# Patient Record
Sex: FEMALE | Race: WHITE | Employment: STUDENT | URBAN - NONMETROPOLITAN AREA
[De-identification: names, ages, dates, MRNs, and addresses within clinical notes are randomized per-mention and may not be internally consistent; named-entity substitution may affect disease eponyms.]

---
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
## 2021-01-04 DIAGNOSIS — J45.31 MILD PERSISTENT ASTHMA WITH ACUTE EXACERBATION: ICD-10-CM

## 2021-01-04 RX ORDER — IPRATROPIUM BROMIDE AND ALBUTEROL SULFATE 2.5; .5 MG/3ML; MG/3ML
1 SOLUTION RESPIRATORY (INHALATION) EVERY 6 HOURS
Qty: 360 ML | Refills: 1 | Status: SHIPPED | OUTPATIENT
Start: 2021-01-04 | End: 2021-06-10

## 2021-01-04 NOTE — TELEPHONE ENCOUNTER
Requested Prescriptions     Pending Prescriptions Disp Refills    ipratropium-albuterol (DUONEB) 0.5-2.5 (3) MG/3ML SOLN nebulizer solution [Pharmacy Med Name: Ipratropium-Albuterol 0.5-2.5 (3) MG/3ML Inhalation Solution (DUO-NEB)] 360 mL 1     Sig: Inhale 3 mLs into the lungs every 6 hours

## 2021-01-15 ENCOUNTER — TELEMEDICINE (OUTPATIENT)
Dept: PRIMARY CARE CLINIC | Age: 21
End: 2021-01-15
Payer: COMMERCIAL

## 2021-01-15 DIAGNOSIS — F41.9 ANXIETY AND DEPRESSION: Primary | ICD-10-CM

## 2021-01-15 DIAGNOSIS — F32.A ANXIETY AND DEPRESSION: Primary | ICD-10-CM

## 2021-01-15 PROCEDURE — 99213 OFFICE O/P EST LOW 20 MIN: CPT | Performed by: PEDIATRICS

## 2021-01-15 RX ORDER — VENLAFAXINE HYDROCHLORIDE 150 MG/1
150 CAPSULE, EXTENDED RELEASE ORAL DAILY
Qty: 30 CAPSULE | Refills: 5 | Status: SHIPPED | OUTPATIENT
Start: 2021-01-15 | End: 2021-05-12

## 2021-01-15 ASSESSMENT — ENCOUNTER SYMPTOMS
SORE THROAT: 0
WHEEZING: 0
SHORTNESS OF BREATH: 0
DIARRHEA: 0
BACK PAIN: 0
ABDOMINAL PAIN: 0
NAUSEA: 0
CONSTIPATION: 0
CHEST TIGHTNESS: 0
COUGH: 1
SINUS PRESSURE: 1
VOMITING: 0
EYE DISCHARGE: 0

## 2021-01-15 NOTE — PROGRESS NOTES
[address] [facility], [address]  Phone [phone]   Fax [phone]      OFFICE VISIT: 1/15/2021    Mayuri Aquinobitt-: 2000      [facility]  Reason For Visit:  Mayuri Suh is a 24 y.o. Depression    She is really battling with depression. She is taking Lexapro does not seem to be working. She is going to start counseling with [facility]. Her mom is not doing well. Her father is in the hospital as well and not doing well. He has been on a ventilator for 3 weeks. He now has a trach. She is just not doing well with all of the stress. She does think that she needs something different than Lexapro to help her through this time. We discussed options and we are going to move to an SNRI as she is not having much trouble with anxiety but it is predominantly depression. We discussed Effexor XR and she is willing to give this a try. Given that she is on 20 mg of Lexapro we are going to use 150 mg of the Effexor XR to start. vitals were not taken for this visit. There is no height or weight on file to calculate BMI. I have reviewed the following with the Ms. Orlando   Lab Review  Office Visit on 10/29/2020   Component Date Value    SARS-CoV-2, GABRIEL 10/29/2020 NOT DETECTED      Copies of these are in the chart.     Current Outpatient Medications   Medication Sig Dispense Refill    venlafaxine (EFFEXOR XR) 150 MG extended release capsule Take 1 capsule by mouth daily 30 capsule 5    ipratropium-albuterol (DUONEB) 0.5-2.5 (3) MG/3ML SOLN nebulizer solution Inhale 3 mLs into the lungs every 6 hours 360 mL 1    atorvastatin (LIPITOR) 20 MG tablet Take 1 tablet by mouth daily 90 tablet 3    ibuprofen (ADVIL;MOTRIN) 800 MG tablet Take 1 tablet by mouth every 8 hours as needed for Pain With food or milk 120 tablet 3    hydrOXYzine (ATARAX) 25 MG tablet Take 1 tablet by mouth every 6 hours as needed for Anxiety 90 tablet 5 HENT: Positive for congestion, postnasal drip ( Thick) and sinus pressure ( Over maxillary sinuses). Negative for ear discharge, ear pain and sore throat. Eyes: Negative for discharge and visual disturbance. Respiratory: Positive for cough. Negative for chest tightness, shortness of breath and wheezing. Asthma is stable. Cardiovascular: Negative for chest pain, palpitations and leg swelling. Gastrointestinal: Negative for abdominal pain, constipation, diarrhea, nausea and vomiting. Genitourinary: Negative for difficulty urinating, dysuria and menstrual problem. Musculoskeletal: Negative for arthralgias, back pain, joint swelling and myalgias. Skin: Negative for rash. Neurological: Negative for dizziness, weakness and headaches. Hematological: Negative for adenopathy. Does not bruise/bleed easily. Psychiatric/Behavioral: Negative for agitation (doing better), sleep disturbance and suicidal ideas. The patient is nervous/anxious (but tolerating). Physical Exam  Physical exam was not performed as this was a video teleconference visit using [address]    1. Anxiety and depression  F41.9 venlafaxine (EFFEXOR XR) 150 MG extended release capsule    F32.9          PLAN    1. Anxiety and depression  We are going to change from Lexapro to Effexor XR  - venlafaxine (EFFEXOR XR) 150 MG extended release capsule; Take 1 capsule by mouth daily  Dispense: 30 capsule; Refill: 5      No orders of the defined types were placed in this encounter. Return in about 1 month (around 2/15/2021) for 15. Ian Urbina is a 24 y.o. female being evaluated by a Virtual Visit (video visit) encounter to address concerns as mentioned above. A caregiver was present when appropriate. Due to this being a TeleHealth encounter (During [facility]), evaluation of the following organ systems was limited: Vitals/Constitutional/EENT/Resp/CV/GI//MS/Neuro/Skin/Heme-Lymph-Imm. Pursuant to the emergency declaration under the [address] [place] [address] and the [employer] Act, this Virtual Visit was conducted with patient's (and/or legal guardian's) consent, to reduce the patient's risk of exposure to COVID-19 and provide necessary medical care. The patient (and/or legal guardian) has also been advised to contact this office for worsening conditions or problems, and seek emergency medical treatment and/or call 911 if deemed necessary. Patient identification was verified at the start of the visit: Yes    Total time spent for this encounter: 20m    Services were provided through a video synchronous discussion virtually to substitute for in-person clinic visit. Patient and provider were located at their individual homes. --DANIELA Naqvi DO on 1/15/2021 at 3:20 PM    An electronic signature was used to authenticate this note.

## 2021-01-15 NOTE — PATIENT INSTRUCTIONS
Patient Education        Recovering From Depression: Care Instructions  Your Care Instructions     Taking good care of yourself is important as you recover from depression. In time, your symptoms will fade as your treatment takes hold. Do not give up. Instead, focus your energy on getting better. Your mood will improve. It just takes some time. Focus on things that can help you feel better, such as being with friends and family, eating well, and getting enough rest. But take things slowly. Do not do too much too soon. You will begin to feel better gradually. Follow-up care is a key part of your treatment and safety. Be sure to make and go to all appointments, and call your doctor if you are having problems. It's also a good idea to know your test results and keep a list of the medicines you take. How can you care for yourself at home? Be realistic  · If you have a large task to do, break it up into smaller steps you can handle, and just do what you can. · You may want to put off important decisions until your depression has lifted. If you have plans that will have a major impact on your life, such as marriage, divorce, or a job change, try to wait a bit. Talk it over with friends and loved ones who can help you look at the overall picture first.  · Reaching out to people for help is important. Do not isolate yourself. Let your family and friends help you. Find someone you can trust and confide in, and talk to that person. · Be patient, and be kind to yourself. Remember that depression is not your fault and is not something you can overcome with willpower alone. Treatment is important for depression, just like for any other illness. Feeling better takes time, and your mood will improve little by little. Stay active  · Stay busy and get outside. Take a walk, or try some other light exercise. · Talk with your doctor about an exercise program. Exercise can help with mild depression. · Avoid sleeping pills unless they are prescribed by the doctor treating your depression. Sleeping pills may make you groggy during the day, and they may interact with other medicine you are taking. · If you have any other illnesses, such as diabetes, heart disease, or high blood pressure, make sure to continue with your treatment. Tell your doctor about all of the medicines you take, including those with or without a prescription. · If you or someone you know talks about suicide, self-harm, or feeling hopeless, get help right away. Call the [address] [place] [address] at 0-534-793-JBTK (5-343.138.9578) or text HOME to 290209 to access the Crisis Text Line. Consider saving these numbers in your phone. When should you call for help? Call 682 anytime you think you may need emergency care. For example, call if:    · You feel like hurting yourself or someone else.     · Someone you know has depression and is about to attempt or is attempting suicide. Call your doctor now or seek immediate medical care if:    · You hear voices.     · Someone you know has depression and:  ? Starts to give away his or her possessions. ? Uses illegal drugs or drinks alcohol heavily. ? Talks or writes about death, including writing suicide notes or talking about guns, knives, or pills. ? Starts to spend a lot of time alone. ? Acts very aggressively or suddenly appears calm. Watch closely for changes in your health, and be sure to contact your doctor if:    · You do not get better as expected. Where can you learn more? Go to https://Casa SystemslakshmiGreen Zebra Grocery.Ascent Corporation. org and sign in to your Triventus account. Enter H479 in the Gauzy box to learn more about \"Recovering From Depression: Care Instructions. \"     If you do not have an account, please click on the \"Sign Up Now\" link. Current as of: January 31, 2020               Content Version: 12.6  © 9853-5671 Once Innovations, Incorporated. Care instructions adapted under license by [facility] ([facility]). If you have questions about a medical condition or this instruction, always ask your healthcare professional. [address] any warranty or liability for your use of this information. Patient Education        Depression Treatment: Care Instructions  Your Care Instructions     Depression is a condition that affects the way you feel, think, and act. It causes symptoms such as low energy, loss of interest in daily activities, and sadness or grouchiness that goes on for a long time. Depression is very common and affects men and women of all ages. Depression is a medical illness caused by changes in the natural chemicals in your brain. It is not a character flaw, and it does not mean that you are a bad or weak person. It does not mean that you are going crazy. It is important to know that depression can be treated. Medicines, counseling, and self-care can all help. Many people do not get help because they are embarrassed or think that they will get over the depression on their own. But some people do not get better without treatment. Follow-up care is a key part of your treatment and safety. Be sure to make and go to all appointments, and call your doctor if you are having problems. It's also a good idea to know your test results and keep a list of the medicines you take. How can you care for yourself at home? Learn about antidepressant medicines  Antidepressant medicines can improve or end the symptoms of depression. You may need to take the medicine for at least 6 months, and often longer. Keep taking your medicine even if you feel better. If you stop taking it too soon, your symptoms may come back or get worse. Go to https://chpepiceweb.AngioChem. org and sign in to your GreenWave Reality account. Enter N707 in the [facility] box to learn more about \"Depression Treatment: Care Instructions. \"     If you do not have an account, please click on the \"Sign Up Now\" link. Current as of: January 31, 2020               Content Version: 12.6  © 2006-2020 LEYIO. Care instructions adapted under license by [facility] ([facility]). If you have questions about a medical condition or this instruction, always ask your healthcare professional. [place] [address] any warranty or liability for your use of this information. Patient Education        Preventing Depression From Coming Back: Care Instructions  Overview     Some people have depression symptoms that come back. Depression often comes and goes during a lifetime. But there are many things you can do to keep it from coming back. Follow-up care is a key part of your treatment and safety. Be sure to make and go to all appointments, and call your doctor if you are having problems. It's also a good idea to know your test results and keep a list of the medicines you take. What do you need to know? Know your risk of depression coming back  Many things can make a person more likely to have depression again. These include having depression symptoms that continue after treatment, a previous episode of depression, and a history of childhood abuse or neglect. It is important to know your risk and to recognize warning signs of depression symptoms returning. Once you know these things, you will be better able to keep it from happening to you. Know the warning signs of depression returning  The two most common signs of depression coming back are:  · Feeling sad or hopeless. · Losing interest in your daily activities. You may have other symptoms, such as:  · You eat more or less than usual.  · You sleep too much or not enough. · You feel restless and unable to sit still. · You feel unable to move. · You feel tired all the time. · You feel unworthy or guilty without an obvious reason. · You have problems concentrating, remembering, or making decisions. · You think often about death or suicide. · You feel angry or have panic attacks. How can you care for yourself at home? · Take your medicine as prescribed. Call your doctor if you have any problems with your medicine. · Continue to take your medicine after your symptoms improve. Taking your medicine for at least 6 months after you feel better can help keep you from getting depressed again. If your depression keeps coming back, your doctor may recommend you take medicine even longer. · Continue counseling. It may help prevent depression from returning, especially if you've had multiple episodes of depression. Talk with your counselor if you are having a hard time attending your sessions or you think the sessions aren't working. Don't just stop going. · Eat healthy foods. Include fruits, vegetables, beans, and whole grains in your diet each day. · Get regular exercise. Go for a walk or jog, ride your bike, or play sports with friends. · See your doctor right away if you have new symptoms or feel that your depression is coming back. · Keep a regular sleep schedule. Try for 8 hours of sleep a night. · Avoid using illegal drugs or marijuana and drinking alcohol. · If you or someone you know talks about suicide, self-harm, or feeling hopeless, get help right away. Call the [facility] at 1-800-273-talk (1-319.590.2020) or text HOME to 418515 to access the Crisis Text Line. Consider saving these numbers in your phone. When should you call for help? Call 911 anytime you think you may need emergency care. For example, call if:    · You are thinking about suicide or are threatening suicide.   · You feel you cannot stop from hurting yourself or someone else.     · You hear or see things that aren't real.     · You think or speak in a bizarre way that is not like your usual behavior. Call your doctor now or seek immediate medical care if:    · You are drinking a lot of alcohol or using illegal drugs.     · You are talking or writing about death. Watch closely for changes in your health, and be sure to contact your doctor if:    · You find it hard or it's getting harder to deal with school, a job, family, or friends.     · You think your treatment is not helping or you are not getting better.     · Your symptoms get worse or you get new symptoms.     · You have any problems with your antidepressant medicines, such as side effects, or you are thinking about stopping your medicine.     · You are having manic behavior, such as having very high energy, needing less sleep than normal, or showing risky behavior such as spending money you don't have or abusing others verbally or physically. Where can you learn more? Go to https://ERN.Bebitos. org and sign in to your SwapBeats account. Enter K524 in the NantWorks box to learn more about \"Preventing Depression From Coming Back: Care Instructions. \"     If you do not have an account, please click on the \"Sign Up Now\" link. Current as of: January 31, 2020               Content Version: 12.6  © 8003-8686 [employer], Incorporated. Care instructions adapted under license by [facility] ([facility]). If you have questions about a medical condition or this instruction, always ask your healthcare professional. [place] [address] any warranty or liability for your use of this information.

## 2021-02-02 RX ORDER — NORGESTIMATE AND ETHINYL ESTRADIOL 0.25-0.035
1 KIT ORAL DAILY
Qty: 84 TABLET | Refills: 3 | Status: SHIPPED | OUTPATIENT
Start: 2021-02-02 | End: 2022-01-26 | Stop reason: SDUPTHER

## 2021-02-02 NOTE — TELEPHONE ENCOUNTER
Received fax from pharmacy requesting refill on pts medication(s). Pt was last seen in office on 1/15/2021  and has a follow up scheduled for 2/22/2021. Will send request to  Dr. Iesha Gonzales  for patient.      Requested Prescriptions     Pending Prescriptions Disp Refills    PREVIFEM 0.25-35 MG-MCG per tablet [Pharmacy Med Name: Norgestimate-Eth Estradiol 0.25-35 MG-MCG Oral Tablet (ORTHO-CYCLEN)] 84 tablet 2     Sig: Take 1 tablet by mouth daily

## 2021-02-22 ENCOUNTER — TELEMEDICINE (OUTPATIENT)
Dept: PRIMARY CARE CLINIC | Age: 21
End: 2021-02-22
Payer: COMMERCIAL

## 2021-02-22 DIAGNOSIS — S86.912A STRAIN OF LEFT KNEE, INITIAL ENCOUNTER: Primary | ICD-10-CM

## 2021-02-22 DIAGNOSIS — J45.20 MILD INTERMITTENT ASTHMA WITHOUT COMPLICATION: ICD-10-CM

## 2021-02-22 DIAGNOSIS — Z98.2 S/P VP SHUNT: ICD-10-CM

## 2021-02-22 DIAGNOSIS — Z86.69 HISTORY OF CHIARI MALFORMATION: ICD-10-CM

## 2021-02-22 DIAGNOSIS — Z87.2 HISTORY OF COLD-INDUCED URTICARIA: ICD-10-CM

## 2021-02-22 PROCEDURE — 99214 OFFICE O/P EST MOD 30 MIN: CPT | Performed by: PEDIATRICS

## 2021-02-22 ASSESSMENT — ENCOUNTER SYMPTOMS
BACK PAIN: 0
WHEEZING: 0
SORE THROAT: 0
COUGH: 0
DIARRHEA: 0
NAUSEA: 0
ABDOMINAL PAIN: 0
SHORTNESS OF BREATH: 0
SINUS PRESSURE: 0
EYE DISCHARGE: 0
CHEST TIGHTNESS: 0
VOMITING: 0
CONSTIPATION: 0

## 2021-02-22 NOTE — PROGRESS NOTES
[facility] [facility], [address]  Phone [phone]   Fax [phone]      OFFICE VISIT: 2021    Tyrese Orlando-: 2000      \[facility]\""  Reason For Visit:  Tyrese Almeida is a 24 y.o. No chief complaint on file. Pt presents via video conferencing on follow up for multiple issues. She twisted her L knee again and this is bothering her . She is taking ibuprofen, but this is not helping as much as it has in the past.    She had an episode of getting really cold and developed whelps and itching. She got in a warm shower and this helped along with a Benadryl. We did discuss using an extended release antihistamine. She is using a ACE bandage. We discussed options including prednisone, but she does not want to do that. Breathing is doing very well. She is still taking Singulair but she is not on an antihistamine  We discussed starting this back up. She is using her inhalers sparingly. She does not need any refills of these today. Headaches: These are also very stable. No problems there       vitals were not taken for this visit. There is no height or weight on file to calculate BMI. I have reviewed the following with the Ms. Orlando   Lab Review  Office Visit on 10/29/2020   Component Date Value    SARS-CoV-2, GABRIEL 10/29/2020 NOT DETECTED      Copies of these are in the chart.     Current Outpatient Medications   Medication Sig Dispense Refill    norgestimate-ethinyl estradiol (PREVIFEM) 0.25-35 MG-MCG per tablet Take 1 tablet by mouth daily 84 tablet 3    venlafaxine (EFFEXOR XR) 150 MG extended release capsule Take 1 capsule by mouth daily 30 capsule 5    ipratropium-albuterol (DUONEB) 0.5-2.5 (3) MG/3ML SOLN nebulizer solution Inhale 3 mLs into the lungs every 6 hours 360 mL 1    atorvastatin (LIPITOR) 20 MG tablet Take 1 tablet by mouth daily 90 tablet 3    ibuprofen (ADVIL;MOTRIN) 800 MG tablet Take 1 tablet by mouth every 8 hours as needed sinus pressure and sore throat. Eyes: Negative for discharge and visual disturbance. Respiratory: Negative for cough, chest tightness, shortness of breath and wheezing. Asthma is stable. Cardiovascular: Negative for chest pain, palpitations and leg swelling. Gastrointestinal: Negative for abdominal pain, constipation, diarrhea, nausea and vomiting. Genitourinary: Negative for difficulty urinating, dysuria and menstrual problem. Musculoskeletal: Negative for arthralgias, back pain, joint swelling and myalgias. Skin: Negative for rash. Neurological: Negative for dizziness, weakness and headaches. Hematological: Negative for adenopathy. Does not bruise/bleed easily. Psychiatric/Behavioral: Negative for agitation (doing better), sleep disturbance and suicidal ideas. The patient is nervous/anxious (but tolerating). Physical Exam  Physical exam was not performed as this was a video teleconference visit using [address]    1. Strain of left knee, initial encounter  S86.912A    2. Mild intermittent asthma without complication  Y11.97    3. History of Chiari malformation  Z86.69    4. S/P  shunt  Z98.2    5. History of cold-induced urticaria  Z87.2          PLAN    1. Strain of left knee, initial encounter  We discussed the options in regards to her knee. She is presently using ibuprofen and compression elevation and rest.  We did discuss the use of caution with ice given her hives recently with cold exposure. We will add back an antihistamine on a daily basis. We will use a long-acting nonsedating antihistamine. 2. Mild intermittent asthma without complication  Stable on present medication regimen    3. History of Chiari malformation  Doing well with present regimen. No significant increase in headaches. 4. S/P  shunt  No issues with shunt.     5.  Cold-induced Urticaria  Recommended that she use a long-acting antihistamine on a daily basis. Cautions recommended with significant cold exposure. No orders of the defined types were placed in this encounter. Return in about 6 months (around 8/22/2021) for 30. Huey Thompson is a 24 y.o. female being evaluated by a Virtual Visit (video visit) encounter to address concerns as mentioned above. A caregiver was present when appropriate. Due to this being a TeleHealth encounter (During DKPSL-86 public health emergency), evaluation of the following organ systems was limited: Vitals/Constitutional/EENT/Resp/CV/GI//MS/Neuro/Skin/Heme-Lymph-Imm. Pursuant to the emergency declaration under the [address] [place] [address] authority and the [employer] Act, this Virtual Visit was conducted with patient's (and/or legal guardian's) consent, to reduce the patient's risk of exposure to COVID-19 and provide necessary medical care. The patient (and/or legal guardian) has also been advised to contact this office for worsening conditions or problems, and seek emergency medical treatment and/or call 911 if deemed necessary. Patient identification was verified at the start of the visit: Yes    Total time spent for this encounter: 30m    Services were provided through a video synchronous discussion virtually to substitute for in-person clinic visit. Patient and provider were located at their individual homes. --DANIELA Wu DO on 2/22/2021 at 8:26 AM    An electronic signature was used to authenticate this note.

## 2021-02-22 NOTE — PATIENT INSTRUCTIONS
Patient Education        Strain or Sprain: Care Instructions  Your Care Instructions     A strain happens when you overstretch, or pull, a muscle. A sprain occurs when you stretch or tear a ligament, the tough tissue that connects one bone to another. These problems can happen when you exercise or lift something or when you are in an accident. Rest and other home care can help strains and sprains heal.  The doctor has checked you carefully, but problems can develop later. If you notice any problems or new symptoms,  get medical treatment right away. Follow-up care is a key part of your treatment and safety. Be sure to make and go to all appointments, and call your doctor if you are having problems. It's also a good idea to know your test results and keep a list of the medicines you take. How can you care for yourself at home? · If your doctor gave you a sling, splint, brace, or immobilizer, use it exactly as directed. · Rest the strained or sprained area, and follow your doctor's advice about when you can be active again. · Put ice or a cold pack on the sore area for 10 to 20 minutes at a time to stop swelling. Try this every 1 to 2 hours for 3 days (when you are awake) or until the swelling goes down. Put a thin cloth between the ice pack and your skin. Keep your splint or brace dry. · Prop up a sore arm or leg on a pillow when you ice it or anytime you sit or lie down. Try to keep it higher than the level of your heart. This will help reduce swelling. · Take pain medicines exactly as directed. ? If the doctor gave you a prescription medicine for pain, take it as prescribed. ? If you are not taking a prescription pain medicine, ask your doctor if you can take an over-the-counter medicine. · Do exercises as directed by your doctor or physical therapist.  · Return to your usual level of activity slowly. · Do not do anything that makes the pain worse. When should you call for help? Call your doctor now or seek immediate medical care if:    · You have severe or increasing pain.     · You have tingling, weakness, or numbness in the area.     · The area turns cold or changes color.     · Your cast or splint feels too tight.     · You have symptoms of a blood clot, such as:  ? Pain in your calf, back of the knee, thigh, or groin. ? Redness and swelling in your leg or groin.     · You cannot move the strained part of your body. Watch closely for changes in your health, and be sure to contact your doctor if:    · You do not get better as expected. Where can you learn more? Go to https://Spoke.docBeat. org and sign in to your Heliatek account. Enter T386 in the 9facts box to learn more about \"Strain or Sprain: Care Instructions. \"     If you do not have an account, please click on the \"Sign Up Now\" link. Current as of: March 2, 2020               Content Version: 12.6  © 2006-2020 [employer] News Enterprises, Incorporated. Care instructions adapted under license by [facility] ([facility]). If you have questions about a medical condition or this instruction, always ask your healthcare professional. [place] [address] any warranty or liability for your use of this information.

## 2021-03-19 ENCOUNTER — IMMUNIZATION (OUTPATIENT)
Dept: VACCINE CLINIC | Facility: HOSPITAL | Age: 21
End: 2021-03-19

## 2021-03-19 PROCEDURE — 91300 HC SARSCOV02 VAC 30MCG/0.3ML IM: CPT | Performed by: THORACIC SURGERY (CARDIOTHORACIC VASCULAR SURGERY)

## 2021-03-19 PROCEDURE — 0001A: CPT | Performed by: THORACIC SURGERY (CARDIOTHORACIC VASCULAR SURGERY)

## 2021-03-23 DIAGNOSIS — J45.20 MILD INTERMITTENT ASTHMA WITHOUT COMPLICATION: ICD-10-CM

## 2021-03-23 RX ORDER — MONTELUKAST SODIUM 10 MG/1
10 TABLET ORAL DAILY
Qty: 90 TABLET | Refills: 3 | Status: SHIPPED | OUTPATIENT
Start: 2021-03-23 | End: 2022-03-28

## 2021-03-23 NOTE — TELEPHONE ENCOUNTER
Received fax from pharmacy requesting refill on pts medication(s). Pt was last seen in office on 2/22/2021  and has a follow up scheduled for 8/23/2021. Will send request to  Dr. Serina Fernández  for patient.      Requested Prescriptions     Pending Prescriptions Disp Refills    montelukast (SINGULAIR) 10 MG tablet [Pharmacy Med Name: Montelukast Sodium 10 MG Oral Tablet (SINGULAIR)] 90 tablet 3     Sig: Take 1 tablet by mouth daily

## 2021-03-26 ENCOUNTER — PATIENT MESSAGE (OUTPATIENT)
Dept: PRIMARY CARE CLINIC | Age: 21
End: 2021-03-26

## 2021-03-26 DIAGNOSIS — M25.562 CHRONIC PAIN OF LEFT KNEE: Primary | ICD-10-CM

## 2021-03-26 DIAGNOSIS — G89.29 CHRONIC PAIN OF LEFT KNEE: Primary | ICD-10-CM

## 2021-04-09 ENCOUNTER — IMMUNIZATION (OUTPATIENT)
Dept: VACCINE CLINIC | Facility: HOSPITAL | Age: 21
End: 2021-04-09

## 2021-04-09 PROCEDURE — 0002A: CPT | Performed by: THORACIC SURGERY (CARDIOTHORACIC VASCULAR SURGERY)

## 2021-04-09 PROCEDURE — 91300 HC SARSCOV02 VAC 30MCG/0.3ML IM: CPT | Performed by: THORACIC SURGERY (CARDIOTHORACIC VASCULAR SURGERY)

## 2021-05-12 DIAGNOSIS — F41.9 ANXIETY AND DEPRESSION: ICD-10-CM

## 2021-05-12 DIAGNOSIS — F32.A ANXIETY AND DEPRESSION: ICD-10-CM

## 2021-05-12 RX ORDER — VENLAFAXINE HYDROCHLORIDE 150 MG/1
150 CAPSULE, EXTENDED RELEASE ORAL DAILY
Qty: 90 CAPSULE | Refills: 2 | Status: SHIPPED | OUTPATIENT
Start: 2021-05-12 | End: 2022-01-25 | Stop reason: SDUPTHER

## 2021-05-12 NOTE — TELEPHONE ENCOUNTER
Received fax from pharmacy requesting refill on pts medication(s). Pt was last seen in office on 2/22/2021  and has a follow up scheduled for 8/23/2021. Will send request to  Dr. Shayy Rivas  for patient. Requested Prescriptions     Pending Prescriptions Disp Refills    venlafaxine (EFFEXOR XR) 150 MG extended release capsule [Pharmacy Med Name: Venlafaxine HCl  MG Oral Capsule Extended Release 24 Hour (EFFEXOR-XR)] 30 capsule 5     Sig: Take 1 capsule by mouth Daily.

## 2021-05-27 ENCOUNTER — PATIENT MESSAGE (OUTPATIENT)
Dept: PRIMARY CARE CLINIC | Age: 21
End: 2021-05-27

## 2021-05-27 NOTE — TELEPHONE ENCOUNTER
Okay to send in [address] 300 mg twice daily x10 days, dispense #20 with no refills    Medrol Dosepak 4 milligrams, 1 pack, no refills

## 2021-05-28 RX ORDER — METHYLPREDNISOLONE 4 MG/1
TABLET ORAL
Qty: 1 KIT | Refills: 0 | Status: SHIPPED | OUTPATIENT
Start: 2021-05-28 | End: 2021-06-03

## 2021-05-28 RX ORDER — CEFDINIR 300 MG/1
300 CAPSULE ORAL 2 TIMES DAILY
Qty: 20 CAPSULE | Refills: 0 | Status: SHIPPED | OUTPATIENT
Start: 2021-05-28 | End: 2021-06-07

## 2021-06-10 DIAGNOSIS — J45.31 MILD PERSISTENT ASTHMA WITH ACUTE EXACERBATION: ICD-10-CM

## 2021-06-10 RX ORDER — IPRATROPIUM BROMIDE AND ALBUTEROL SULFATE 2.5; .5 MG/3ML; MG/3ML
SOLUTION RESPIRATORY (INHALATION)
Qty: 360 ML | Refills: 1 | Status: SHIPPED | OUTPATIENT
Start: 2021-06-10 | End: 2022-09-21 | Stop reason: SDUPTHER

## 2021-08-22 NOTE — PROGRESS NOTES
[address] [facility], [address]  Phone [phone]   Fax [phone]      OFFICE VISIT: 1/10/2018    Hallie Manzanares- : 2000      HPI  Reason For Visit:  Amalia Lund is a 13 y.o. Health Maintenance today  Date of Most Recent Physical:over a year      The patient presents today for one month follow up  See office note from 2017. We treated her with prednisone and DuoNeb's for her asthma. She also had sinusitis at that time and required an antibiotic. Needs immunizations   She needs hepatitis A and meningococcal vaccine. She will also need a 2nd hepatitis A vaccine in 6 months time. height is 5' 8\" (1.727 m) and weight is 309 lb (140.2 kg) (abnormal). Her temporal temperature is 97.1 °F (36.2 °C). Her blood pressure is 134/82 and her pulse is 88. Her oxygen saturation is 98%. Body mass index is 46.98 kg/m². Notes Recorded by EVELYN Maddox on 2017 at 5:20 PM  Please notify:  CRP: WNL  Thyroid: WNL  Cholesterol: Total is elevated at 242. HDL is good at 66. However, the LDL is very elevated at 183. (This was previously 106). We recommend this value be <100. Recommend: Eat a variety of foods every day. Replace red meat with fish, poultry, and soy protein (like tofu). Limit processed and packaged foods like chips, crackers, and cookies. Bake, broil, or steam foods. Don't hsu them. Limit foods high in cholesterol. These include egg yolks. Be physically active. Get at least 30 minutes of exercise on most days of the week. Walking is a good choice. You also may want to do other activities, such as running, swimming, cycling, or playing tennis or team sports. Stay at a healthy weight or lose weight by making the changes in eating and physical activity listed above. Losing just a small amount of weight, even 5 to 10 pounds, can reduce your risk for having a heart attack or stroke. Recommend rechecking lipid profile in 3 months.  If these (3) MG/3ML SOLN nebulizer solution Inhale 3 mLs into the lungs every 4 hours 360 mL 1    ibuprofen (ADVIL;MOTRIN) 800 MG tablet Take 1 tablet by mouth every 8 hours as needed for Pain With food or milk 120 tablet 3    lamoTRIgine (LAMICTAL) 200 MG tablet Take 1 tablet by mouth 2 times daily 60 tablet 11    Cetirizine HCl (ZYRTEC ALLERGY) 10 MG CAPS Take 10 mg by mouth daily      albuterol (PROVENTIL HFA;VENTOLIN HFA) 108 (90 BASE) MCG/ACT inhaler Inhale 2 puffs into the lungs every 6 hours as needed for Wheezing. 1 Inhaler 5    Multiple Vitamin (MULTI-DAY VITAMINS PO) Take 1 tablet by mouth daily. No current facility-administered medications for this visit. Allergies: Bactrim [sulfamethoxazole-trimethoprim]; Other; Pcn [penicillins]; Sulfa antibiotics; and Sulfur    Past Medical History:   Diagnosis Date    Asthma     History of Chiari malformation     Seizures ([facility] [address]     VSD (ventricular septal defect)        Past Surgical History:   Procedure Laterality Date    ADENOIDECTOMY      OTHER SURGICAL HISTORY      chiari decompression    TONSILLECTOMY      TYMPANOSTOMY TUBE PLACEMENT      VENTRICULOPERITONEAL SHUNT         Social History   Substance Use Topics    Smoking status: Never Smoker    Smokeless tobacco: Never Used    Alcohol use No       Review of Systems   Constitutional: Negative for fatigue and unexpected weight change. HENT: Negative for congestion, ear pain, sinus pressure and sore throat. Eyes: Negative for pain and visual disturbance. Respiratory: Negative for cough, shortness of breath and wheezing. Cardiovascular: Negative for chest pain, palpitations and leg swelling. Gastrointestinal: Negative for abdominal pain, diarrhea, nausea and vomiting. Endocrine: Negative for polyuria. Genitourinary: Negative for dysuria, frequency, hematuria and urgency. Musculoskeletal: Negative for back pain and neck pain. Skin: Negative for rash.    Neurological: Negative oral

## 2021-08-30 ENCOUNTER — PATIENT MESSAGE (OUTPATIENT)
Dept: PRIMARY CARE CLINIC | Age: 21
End: 2021-08-30

## 2021-08-30 RX ORDER — METOPROLOL SUCCINATE 25 MG/1
25 TABLET, EXTENDED RELEASE ORAL DAILY
Qty: 30 TABLET | Refills: 5 | Status: SHIPPED | OUTPATIENT
Start: 2021-08-30 | End: 2021-10-01 | Stop reason: SDUPTHER

## 2021-08-30 NOTE — TELEPHONE ENCOUNTER
From: Kraig Mera  To: Javed Sanchez DO  Sent: 8/30/2021 4:18 AM CDT  Subject: Non-Urgent Medical Question    Blood pressure update   Saturday at 10:21 am 157/84 p 93  Saturday at 6:21pm 157/102 p110  Sunday at 11:35am 154/86 p 99  Sunday at 3:23 pm 150/84 p 99  Sunday at 7:34 pm 140/85 p 99  Monday at 4:12am 141/91 p99

## 2021-08-31 ENCOUNTER — TELEMEDICINE (OUTPATIENT)
Dept: PRIMARY CARE CLINIC | Age: 21
End: 2021-08-31

## 2021-08-31 DIAGNOSIS — R00.2 PALPITATIONS: ICD-10-CM

## 2021-08-31 DIAGNOSIS — I10 ESSENTIAL HYPERTENSION: Primary | ICD-10-CM

## 2021-08-31 DIAGNOSIS — F41.9 ANXIETY: ICD-10-CM

## 2021-08-31 DIAGNOSIS — E78.2 MIXED HYPERLIPIDEMIA: ICD-10-CM

## 2021-08-31 DIAGNOSIS — J45.40 MODERATE PERSISTENT ASTHMA WITHOUT COMPLICATION: ICD-10-CM

## 2021-08-31 PROCEDURE — 99214 OFFICE O/P EST MOD 30 MIN: CPT | Performed by: PEDIATRICS

## 2021-08-31 ASSESSMENT — ENCOUNTER SYMPTOMS
VOMITING: 0
EYE DISCHARGE: 0
CHEST TIGHTNESS: 0
COUGH: 0
ABDOMINAL PAIN: 0
SHORTNESS OF BREATH: 0
WHEEZING: 0
SINUS PRESSURE: 0
BACK PAIN: 0
SORE THROAT: 0
DIARRHEA: 0
CONSTIPATION: 0
NAUSEA: 0

## 2021-08-31 NOTE — PROGRESS NOTES
[address] [facility], [address]  Phone [phone]   Fax [phone]      OFFICE VISIT: 2021    Wendie Orlando-: 2000      HPI  Reason For Visit:  Wendie Ventura is a 24 y.o. Anxiety and Depression    Patient presents via [employer] video conferencing on follow-up for anxiety and depression. Present medications:   Effexor  mg daily   Hydroxyzine 25 mg daily   Lamotrigine 200 mg twice daily  Symptoms:she has occasional anxiety attacks, but overall, she is stable. She has rare panic attacks. Hypertension:  She has been having issues with her BP being high. This is improving some. She is going to  the metoprolol today or tomorrow. She is due for some labs. She is working at [employer]. She is vaccinated for Covid 19      Hyperlipidemia  She is not on any medication at present. We do need to recheck labs. Headaches:  She notes that she has headaches when her BP is up  We discussed that this should improve with controlling BP      Palpitations:  She has had episodes of tachycardia. This makes her very nervous and exacerbates the situation. vitals were not taken for this visit. There is no height or weight on file to calculate BMI. I have reviewed the following with the Ms. Orlando   Lab Review  No visits with results within 6 Month(s) from this visit. Latest known visit with results is:   Office Visit on 10/29/2020   Component Date Value    SARS-CoV-2, GABRIEL 10/29/2020 NOT DETECTED      Copies of these are in the chart.     Current Outpatient Medications   Medication Sig Dispense Refill    metoprolol succinate (TOPROL XL) 25 MG extended release tablet Take 1 tablet by mouth daily 30 tablet 5    ipratropium-albuterol (DUONEB) 0.5-2.5 (3) MG/3ML SOLN nebulizer solution Use 1 vial in nebulizer every 6 hours 360 mL 1    venlafaxine (EFFEXOR XR) 150 MG extended release capsule Take 1 capsule by mouth daily 90 capsule 2    Elastic Bandages & Supports (KNEE BRACE) MISC 1 each by Does not apply route daily Wear during activities 1 each 0    montelukast (SINGULAIR) 10 MG tablet Take 1 tablet by mouth daily 90 tablet 3    norgestimate-ethinyl estradiol (PREVIFEM) 0.25-35 MG-MCG per tablet Take 1 tablet by mouth daily 84 tablet 3    atorvastatin (LIPITOR) 20 MG tablet Take 1 tablet by mouth daily 90 tablet 3    ibuprofen (ADVIL;MOTRIN) 800 MG tablet Take 1 tablet by mouth every 8 hours as needed for Pain With food or milk 120 tablet 3    hydrOXYzine (ATARAX) 25 MG tablet Take 1 tablet by mouth every 6 hours as needed for Anxiety 90 tablet 5    lamoTRIgine (LAMICTAL) 200 MG tablet Take 1 tablet by mouth 2 times daily 60 tablet 11    fluticasone (FLONASE) 50 MCG/ACT nasal spray 1 spray by Each Nostril route daily 1 Bottle 1    albuterol sulfate  (90 Base) MCG/ACT inhaler Inhale 2 puffs into the lungs every 6 hours as needed for Wheezing 1 Inhaler 5    Multiple Vitamin (MULTI-DAY VITAMINS PO) Take 1 tablet by mouth daily. No current facility-administered medications for this visit.        Allergies: Eggs or egg-derived products, Bactrim [sulfamethoxazole-trimethoprim], Other, Pcn [penicillins], Sulfa antibiotics, and Sulfur     Past Medical History:   Diagnosis Date    Asthma     History of Chiari malformation     Seizures ([facility] [address]     VSD (ventricular septal defect)        Family History   Problem Relation Age of Onset    Asthma Mother     Lupus Mother     High Blood Pressure Mother     Asthma Maternal Grandmother     Asthma Maternal Grandfather     Cancer Maternal Grandfather     Stroke Maternal Grandfather     Diabetes Maternal Grandfather     Asthma Paternal Grandmother     Stroke Paternal Grandmother     Diabetes Paternal Grandmother     Asthma Paternal Grandfather        Past Surgical History:   Procedure Laterality Date    ADENOIDECTOMY      OTHER SURGICAL HISTORY      chiari decompression    TONSILLECTOMY      TYMPANOSTOMY TUBE PLACEMENT      VENTRICULOPERITONEAL SHUNT         Social History     Tobacco Use    Smoking status: Never Smoker    Smokeless tobacco: Never Used   Substance Use Topics    Alcohol use: No        Review of Systems   Constitutional: Negative for appetite change, chills, fatigue and fever. HENT: Negative for congestion, ear discharge, ear pain, postnasal drip, sinus pressure and sore throat. Eyes: Negative for discharge and visual disturbance. Respiratory: Negative for cough, chest tightness, shortness of breath and wheezing. Asthma is stable. Cardiovascular: Negative for chest pain, palpitations and leg swelling. Blood pressure consistently running high   Gastrointestinal: Negative for abdominal pain, constipation, diarrhea, nausea and vomiting. Genitourinary: Negative for difficulty urinating, dysuria and menstrual problem. Musculoskeletal: Negative for arthralgias, back pain, joint swelling and myalgias. Skin: Negative for rash. Neurological: Positive for headaches ( Mostly when blood pressure is up). Negative for dizziness and weakness. Hematological: Negative for adenopathy. Does not bruise/bleed easily. Psychiatric/Behavioral: Negative for agitation (doing better), sleep disturbance and suicidal ideas. The patient is nervous/anxious (but tolerating). Some panic attacks       Physical Exam  Physical exam was not performed today as this was a video teleconference visit using [address]    1. Essential hypertension  I10 CBC Auto Differential     Comprehensive Metabolic Panel     Microalbumin / Creatinine Urine Ratio   2. Mixed hyperlipidemia  E78.2 Lipid Panel   3. Anxiety  F41.9 T4, Free     TSH without Reflex   4. Palpitations  R00.2 T4, Free     TSH without Reflex   5. Moderate persistent asthma without complication  S83.70 CBC Auto Differential         PLAN    1.  Essential hypertension  We will prescribe Toprol-XL 25 mg daily  She will pick this up either today or tomorrow. Being this is a beta-1 selective beta-blocker, which should result in lowering her blood pressure without any bronchoconstriction at this dose. We could increase this to 50 mg and still remain beta-1 selective. - CBC Auto Differential; Future  - Comprehensive Metabolic Panel; Future  - Microalbumin / Creatinine Urine Ratio; Future    2. Mixed hyperlipidemia  Lipids were elevated last year. She has been watching her diet some. We will see if she was able to get this down on her own  - Lipid Panel; Future    3. Anxiety  Her anxiety is her primary issue right now. The beta-blocker should help with this as well. If this is not sufficient, we will consider additional medications. She is taking Effexor  mg daily. She also has hydroxyzine 25 mg for breakthrough anxiety  - T4, Free; Future  - TSH without Reflex; Future    4. Palpitations  Beta-blockade should be helpful in limiting palpitations. She will initiate this therapy hopefully today  - T4, Free; Future  - TSH without Reflex; Future    5. Moderate persistent asthma without complication  Beta-1 selective beta-blockers should not interfere with her asthma symptoms  - CBC Auto Differential; Future      Orders Placed This Encounter   Procedures    T4, Free    TSH without Reflex    Lipid Panel    CBC Auto Differential    Comprehensive Metabolic Panel    Microalbumin / Creatinine Urine Ratio        Return in about 1 month (around 9/30/2021) for [address] [place] [address], was evaluated through a synchronous (real-time) audio-video encounter. The patient (or guardian if applicable) is aware that this is a billable service. Verbal consent to proceed has been obtained within the past 12 months.  The visit was conducted pursuant to the emergency declaration under the [address] waiver authority and the Coronavirus Preparedness and Response Supplemental Appropriations Act. Patient identification was verified, and a caregiver was present when appropriate. The patient was located in a state where the provider was credentialed to provide care. Total time spent for this encounter: 30m    --DANIELA Del Castillo DO on 8/31/2021 at 8:01 AM    An electronic signature was used to authenticate this note.

## 2021-08-31 NOTE — PATIENT INSTRUCTIONS
Patient Education        High Blood Pressure: Care Instructions  Overview     It's normal for blood pressure to go up and down throughout the day. But if it stays up, you have high blood pressure. Another name for high blood pressure is hypertension. Despite what a lot of people think, high blood pressure usually doesn't cause headaches or make you feel dizzy or lightheaded. It usually has no symptoms. But it does increase your risk of stroke, heart attack, and other problems. You and your doctor will talk about your risks of these problems based on your blood pressure. Your doctor will give you a goal for your blood pressure. Your goal will be based on your health and your age. Lifestyle changes, such as eating healthy and being active, are always important to help lower blood pressure. You might also take medicine to reach your blood pressure goal.  Follow-up care is a key part of your treatment and safety. Be sure to make and go to all appointments, and call your doctor if you are having problems. It's also a good idea to know your test results and keep a list of the medicines you take. How can you care for yourself at home? Medical treatment  · If you stop taking your medicine, your blood pressure will go back up. You may take one or more types of medicine to lower your blood pressure. Be safe with medicines. Take your medicine exactly as prescribed. Call your doctor if you think you are having a problem with your medicine. · Talk to your doctor before you start taking aspirin every day. Aspirin can help certain people lower their risk of a heart attack or stroke. But taking aspirin isn't right for everyone, because it can cause serious bleeding. · See your doctor regularly. You may need to see the doctor more often at first or until your blood pressure comes down.   · If you are taking blood pressure medicine, talk to your doctor before you take decongestants or anti-inflammatory medicine, such as ibuprofen. Some of these medicines can raise blood pressure. · Learn how to check your blood pressure at home. Lifestyle changes  · Stay at a healthy weight. This is especially important if you put on weight around the waist. Losing even 10 pounds can help you lower your blood pressure. · If your doctor recommends it, get more exercise. Walking is a good choice. Bit by bit, increase the amount you walk every day. Try for at least 30 minutes on most days of the week. You also may want to swim, bike, or do other activities. · Avoid or limit alcohol. Talk to your doctor about whether you can drink any alcohol. · Try to limit how much sodium you eat to less than 2,300 milligrams (mg) a day. Your doctor may ask you to try to eat less than 1,500 mg a day. · Eat plenty of fruits (such as bananas and oranges), vegetables, legumes, whole grains, and low-fat dairy products. · Lower the amount of saturated fat in your diet. Saturated fat is found in animal products such as milk, cheese, and meat. Limiting these foods may help you lose weight and also lower your risk for heart disease. · Do not smoke. Smoking increases your risk for heart attack and stroke. If you need help quitting, talk to your doctor about stop-smoking programs and medicines. These can increase your chances of quitting for good. When should you call for help? Call 911  anytime you think you may need emergency care. This may mean having symptoms that suggest that your blood pressure is causing a serious heart or blood vessel problem. Your blood pressure may be over 180/120. For example, call 911 if:    · You have symptoms of a heart attack. These may include:  ? Chest pain or pressure, or a strange feeling in the chest.  ? Sweating. ? Shortness of breath. ? Nausea or vomiting. ? Pain, pressure, or a strange feeling in the back, neck, jaw, or upper belly or in one or both shoulders or arms. ? Lightheadedness or sudden weakness.   ? A fast or irregular heartbeat.     · You have symptoms of a stroke. These may include:  ? Sudden numbness, tingling, weakness, or loss of movement in your face, arm, or leg, especially on only one side of your body. ? Sudden vision changes. ? Sudden trouble speaking. ? Sudden confusion or trouble understanding simple statements. ? Sudden problems with walking or balance. ? A sudden, severe headache that is different from past headaches.     · You have severe back or belly pain. Do not wait until your blood pressure comes down on its own. Get help right away. Call your doctor now or seek immediate care if:    · Your blood pressure is much higher than normal (such as 180/120 or higher), but you don't have symptoms.     · You think high blood pressure is causing symptoms, such as:  ? Severe headache.  ? Blurry vision. Watch closely for changes in your health, and be sure to contact your doctor if:    · Your blood pressure measures higher than your doctor recommends at least 2 times. That means the top number is higher or the bottom number is higher, or both.     · You think you may be having side effects from your blood pressure medicine. Where can you learn more? Go to https://Nimble Apps Limitedpepiceweb.CrimeWatch [place]. org and sign in to your [employer] account. Enter T303 in the [employer] box to learn more about \"High Blood Pressure: Care Instructions. \"     If you do not have an account, please click on the \"Sign Up Now\" link. Current as of: August 31, 2020               Content Version: 12.9  © 2006-2021 [employer]. Care instructions adapted under license by [facility] ([facility]). If you have questions about a medical condition or this instruction, always ask your healthcare professional. [place] [address] any warranty or liability for your use of this information. Patient Education        Learning About High Blood Pressure  What is high blood pressure?      Blood pressure is a measure of how hard the blood pushes against the walls of your arteries. It's normal for blood pressure to go up and down throughout the day. But if it stays up, you have high blood pressure. Another name for high blood pressure is hypertension. Two numbers tell you your blood pressure. The first number is the systolic pressure (top number). It shows how hard the blood pushes when your heart is pumping. The second number is the diastolic pressure (bottom number). It shows how hard the blood pushes between heartbeats, when your heart is relaxed and filling with blood. Your doctor will give you a goal for your blood pressure based on your health and your age. High blood pressure (hypertension) means that the top number stays high, or the bottom number stays high, or both. High blood pressure increases the risk of stroke, heart attack, and other problems. What happens when you have high blood pressure? · Blood flows through your arteries with too much force. Over time, this can damage the heart and the walls of your arteries. But you can't feel it. High blood pressure usually doesn't cause symptoms. · High blood pressure makes your heart work harder. And that can lead to heart failure, which means your heart doesn't pump as much blood as your body needs. · Fat and calcium start to build up in your arteries. This buildup is called hardening of the arteries. It can cause many problems including a heart attack and stroke. · Arteries also carry blood and oxygen to organs like your eyes, kidneys, and brain. If high blood pressure damages those arteries, it can lead to vision loss, kidney disease, stroke, and a higher risk of dementia. How can you prevent high blood pressure? · Stay at a healthy weight. · Try to limit how much sodium you eat to less than 2,300 milligrams (mg) a day. If you limit your sodium to 1,500 mg a day, you can lower your blood pressure even more.   ? Buy foods that are labeled \"unsalted,\" \"sodium-free,\" or \"low-sodium. \" Foods labeled \"reduced-sodium\" and \"light sodium\" may still have too much sodium. ? Flavor your food with garlic, lemon juice, onion, vinegar, herbs, and spices instead of salt. Do not use soy sauce, steak sauce, onion salt, garlic salt, mustard, or ketchup on your food. ? Use less salt (or none) when recipes call for it. You can often use half the salt a recipe calls for without losing flavor. · Be physically active. Get at least 30 minutes of exercise on most days of the week. Walking is a good choice. You also may want to do other activities, such as running, swimming, cycling, or playing tennis or team sports. · Limit alcohol to 2 drinks a day for men and 1 drink a day for women. · Eat plenty of fruits, vegetables, and low-fat dairy products. Eat less saturated and total fats. How is high blood pressure treated? · Your doctor will suggest making lifestyle changes to help your heart. For example, your doctor may ask you to eat healthy foods, quit smoking, lose extra weight, and be more active. · If lifestyle changes don't help enough, your doctor may recommend that you take medicine. · When blood pressure is very high, medicines are needed to lower it. Follow-up care is a key part of your treatment and safety. Be sure to make and go to all appointments, and call your doctor if you are having problems. It's also a good idea to know your test results and keep a list of the medicines you take. Where can you learn more? Go to https://Core Mobile NetworkslakshmiewDimple Dough.Zilta. org and sign in to your [employer] account. Enter P501 in the Sprout Route box to learn more about \"Learning About High Blood Pressure. \"     If you do not have an account, please click on the \"Sign Up Now\" link. Current as of: August 31, 2020               Content Version: 12.9  © 0500-3889 Healthwise, Incorporated. Care instructions adapted under license by [facility] ([facility]).  If you have questions about a medical condition or this instruction, always ask your healthcare professional. [address] any warranty or liability for your use of this information. Patient Education        Learning About Anxiety Disorders  What are anxiety disorders? Anxiety disorders are a type of medical problem. They cause severe anxiety. When you feel anxious, you feel that something bad is about to happen. This feeling interferes with your life. These disorders include:  · Generalized anxiety disorder. You feel worried and stressed about many everyday events and activities. This goes on for several months and disrupts your life on most days. · Panic disorder. You have repeated panic attacks. A panic attack is a sudden, intense fear or anxiety. It may make you feel short of breath. Your heart may pound. · Social anxiety disorder. You feel very anxious about what you will say or do in front of people. For example, you may be scared to talk or eat in public. This problem affects your daily life. · Phobias. You are very scared of a specific object, situation, or activity. For example, you may fear spiders, high places, or small spaces. What are the symptoms? Generalized anxiety disorder  Symptoms may include:  · Feeling worried and stressed about many things almost every day. · Feeling tired or irritable. You may have a hard time concentrating. · Having headaches or muscle aches. · Having a hard time getting to sleep or staying asleep. Panic disorder  You may have repeated panic attacks when there is no reason for feeling afraid. You may change your daily activities because you worry that you will have another attack. Symptoms may include:  · Intense fear, terror, or anxiety. · Trouble breathing or very fast breathing. · Chest pain or tightness. · A heartbeat that races or is not regular.   Social anxiety disorder  Symptoms may include:  · Fear about a social situation, such as eating in front of others or speaking in public. You may worry a lot. Or you may be afraid that something bad will happen. · Anxiety that can cause you to blush, sweat, and feel shaky. · A heartbeat that is faster than normal.  · A hard time focusing. Phobias  Symptoms may include:  · More fear than most people of being around an object, being in a situation, or doing an activity. You might also be stressed about the chance of being around the thing you fear. · Worry about losing control, panicking, fainting, or having physical symptoms like a faster heartbeat when you are around the situation or object. How are these disorders treated? Anxiety disorders can be treated with medicines or counseling. A combination of both may be used. Medicines may include:  · Antidepressants. These may help your symptoms by keeping chemicals in your brain in balance. · Benzodiazepines. These may give you short-term relief of your symptoms. Some people use cognitive-behavioral therapy. A therapist helps you learn to change stressful or bad thoughts into helpful thoughts. Lead a healthy lifestyle  A healthy lifestyle may help you feel better. · Get at least 30 minutes of exercise on most days of the week. Walking is a good choice. · Eat a healthy diet. Include fruits, vegetables, lean proteins, and whole grains in your diet each day. · Try to go to bed at the same time every night. Try for 8 hours of sleep a night. · Find ways to manage stress. Try relaxation exercises. · Avoid alcohol and illegal drugs. Follow-up care is a key part of your treatment and safety. Be sure to make and go to all appointments, and call your doctor if you are having problems. It's also a good idea to know your test results and keep a list of the medicines you take. Where can you learn more? Go to https://trevor.Cricket Media. org and sign in to your BeachMint account.  Enter L883 in the Nipendo box to learn more about \"Learning About Anxiety Disorders. \"     If you do not have an account, please click on the \"Sign Up Now\" link. Current as of: September 23, 2020               Content Version: 12.9  © 2006-2021 Healthwise, [employer]. Care instructions adapted under license by [facility] ([facility]). If you have questions about a medical condition or this instruction, always ask your healthcare professional. [address] any warranty or liability for your use of this information. Patient Education        Palpitations: Care Instructions  Your Care Instructions     Heart palpitations are the uncomfortable sensation that your heart is beating fast or irregularly. You might feel pounding or fluttering in your chest. It might feel like your heart is skipping a beat. Although palpitations may be caused by a heart problem, they also occur because of stress, fatigue, or use of alcohol, caffeine, or nicotine. Many medicines, including diet pills, antihistamines, decongestants, and some herbal products, can cause heart palpitations. Nearly everyone has palpitations from time to time. Depending on your symptoms, your doctor may need to do more tests to try to find the cause of your palpitations. Follow-up care is a key part of your treatment and safety. Be sure to make and go to all appointments, and call your doctor if you are having problems. It's also a good idea to know your test results and keep a list of the medicines you take. How can you care for yourself at home? · Avoid caffeine, nicotine, and excess alcohol. · Do not take illegal drugs, such as methamphetamines and cocaine. · Do not take weight loss or diet medicines unless you talk with your doctor first.  · Get plenty of sleep. · Do not overeat. · If you have palpitations again, take deep breaths and try to relax. This may slow a racing heart. · If you start to feel lightheaded, lie down to avoid injuries that might result if you pass out and fall down.   · Keep a record of your palpitations and bring it to your next doctor's appointment. Write down:  ? The date and time. ? Your pulse. (If your heart is beating fast, it may be hard to count your pulse.)  ? What you were doing when the palpitations started. ? How long the palpitations lasted. ? Any other symptoms. · If an activity causes palpitations, slow down or stop. Talk to your doctor before you do that activity again. · Take your medicines exactly as prescribed. Call your doctor if you think you are having a problem with your medicine. When should you call for help? Call 911 anytime you think you may need emergency care. For example, call if:    · You passed out (lost consciousness).     · You have symptoms of a heart attack. These may include:  ? Chest pain or pressure, or a strange feeling in the chest.  ? Sweating. ? Shortness of breath. ? Pain, pressure, or a strange feeling in the back, neck, jaw, or upper belly or in one or both shoulders or arms. ? Lightheadedness or sudden weakness. ? A fast or irregular heartbeat. After you call 911, the  may tell you to chew 1 adult-strength or 2 to 4 low-dose aspirin. Wait for an ambulance. Do not try to drive yourself.     · You have symptoms of a stroke. These may include:  ? Sudden numbness, tingling, weakness, or loss of movement in your face, arm, or leg, especially on only one side of your body. ? Sudden vision changes. ? Sudden trouble speaking. ? Sudden confusion or trouble understanding simple statements. ? Sudden problems with walking or balance. ? A sudden, severe headache that is different from past headaches. Call your doctor now or seek immediate medical care if:    · You have heart palpitations and:  ? Are dizzy or lightheaded, or you feel like you may faint. ? Have new or increased shortness of breath. Watch closely for changes in your health, and be sure to contact your doctor if:    · You continue to have heart palpitations. Where can you learn more? Go to https://chpepiceweb.Operax. org and sign in to your Astro account. Enter R508 in the Affresolhire box to learn more about \"Palpitations: Care Instructions. \"     If you do not have an account, please click on the \"Sign Up Now\" link. Current as of: August 31, 2020               Content Version: 12.9  © 2006-2021 [employer]. Care instructions adapted under license by [facility] ([facility]). If you have questions about a medical condition or this instruction, always ask your healthcare professional. [address] any warranty or liability for your use of this information. Patient Education        Asthma in Adults: Care Instructions  Overview     Asthma makes it hard for you to breathe. During an asthma attack, the airways swell and narrow. Severe asthma attacks can be dangerous, but you can usually prevent them. Controlling asthma and treating symptoms before they get bad can help you avoid bad attacks. You may also avoid future trips to the doctor. Follow-up care is a key part of your treatment and safety. Be sure to make and go to all appointments, and call your doctor if you are having problems. It's also a good idea to know your test results and keep a list of the medicines you take. How can you care for yourself at home? · Follow your asthma action plan so you can manage your symptoms at home. An asthma action plan will help you prevent and control airway reactions and will tell you what to do during an asthma attack. If you do not have an asthma action plan, work with your doctor to build one. · Take your asthma medicine exactly as prescribed. Medicine plays an important role in controlling asthma. Talk to your doctor right away if you have any questions about what to take and how to take it. ? Use your quick-relief medicine when you have symptoms of an attack. Quick-relief medicine often is an albuterol inhaler. Some people need to use quick-relief medicine before they exercise. ? Take your controller medicine every day, not just when you have symptoms. Controller medicine is usually an inhaled corticosteroid. The goal is to prevent problems before they occur. ? If your doctor prescribed corticosteroid pills to use during an attack, take them as directed. They may take hours to work, but they may shorten the attack and help you breathe better. ? Keep your quick-relief medicine with you at all times. · Talk to your doctor before using other medicines. Some medicines, such as aspirin, can cause asthma attacks in some people. · Check yourself for asthma symptoms to know which step to follow in your action plan. Watch for things like being short of breath, having chest tightness, coughing, and wheezing. Also notice if symptoms wake you up at night or if you get tired quickly when you exercise. · If you have a peak flow meter, use it to check how well you are breathing. This can help you predict when an asthma attack is going to occur. Then you can take medicine to prevent the asthma attack or make it less severe. · See your doctor regularly. These visits will help you learn more about asthma and what you can do to control it. Your doctor will monitor your treatment to make sure the medicine is helping you. · Keep track of your asthma attacks and your treatment. After you have had an attack, write down what triggered it, what helped end it, and any concerns you have about your asthma action plan. Take your diary when you see your doctor. You can then review your asthma action plan and decide if it is working. · Do not smoke or allow others to smoke around you. Avoid smoky places. Smoking makes asthma worse. If you need help quitting, talk to your doctor about stop-smoking programs and medicines. These can increase your chances of quitting for good.   · Learn what triggers an asthma attack for you, and avoid the triggers when you can. Common triggers include colds, smoke, air pollution, dust, pollen, mold, pets, cockroaches, stress, and cold air. · Avoid colds and the flu. Talk to your doctor about getting a pneumococcal vaccine shot. If you have had one before, ask your doctor whether you need a second dose. Get a flu vaccine every fall. If you must be around people with colds or the flu, wash your hands often. When should you call for help? Call 911 anytime you think you may need emergency care. For example, call if:    · You have severe trouble breathing. Call your doctor now or seek immediate medical care if:    · Your symptoms do not get better after you have followed your asthma action plan.     · You cough up yellow, dark brown, or bloody mucus (sputum). Watch closely for changes in your health, and be sure to contact your doctor if:    · Your coughing and wheezing get worse.     · You need to use quick-relief medicine on more than 2 days a week within a month (unless it is just for exercise).     · You need help figuring out what is triggering your asthma attacks. Where can you learn more? Go to https://Cardiostrong.Immunet Corporation. org and sign in to your Natural [employer]vergence account. Enter P597 in the [facility] box to learn more about \"Asthma in Adults: Care Instructions. \"     If you do not have an account, please click on the \"Sign Up Now\" link. Current as of: October 26, 2020               Content Version: 12.9  © 2006-2021 [employer]. Care instructions adapted under license by [facility] ([facility]). If you have questions about a medical condition or this instruction, always ask your healthcare professional. [place] [address] any warranty or liability for your use of this information. Patient Education        Controlling Your Asthma: Care Instructions  Your Care Instructions     Asthma is a long-term condition that affects your breathing.  It causes the airways that lead to the lungs to swell. During an asthma attack, the airways swell and narrow. This makes it hard to breathe. You may wheeze or cough. If you have a bad attack, you may need emergency care. There are two things to do to treat asthma. · Control asthma over the long term. · Treat attacks when they occur. You and your doctor can make an asthma action plan. It tells you what medicines you need to take every day to control asthma symptoms and what to do if you have an asthma attack. Your asthma action plan can help prevent and treat attacks. When you keep your asthma under control, you can prevent severe attacks and lasting damage to your airways. You need to treat your asthma even when you are not having symptoms. Although asthma is a lifelong disease, treatment can help control it and help you stay healthy. Follow-up care is a key part of your treatment and safety. Be sure to make and go to all appointments, and call your doctor if you are having problems. It's also a good idea to know your test results and keep a list of the medicines you take. How can you care for yourself at home? To control asthma over the long term  Medicines   Controller medicines reduce swelling in your lungs. They also prevent asthma attacks. Take your controller medicine exactly as prescribed. Talk to your doctor if you have any problems with your medicine. · Inhaled corticosteroid is a common and effective controller medicine. Using it the right way can prevent or reduce most side effects. · Take your controller medicine every day, not just when you have symptoms. It helps prevent problems before they occur. · Your doctor may prescribe another medicine that you use along with the corticosteroid. This is often a long-acting bronchodilator. Do not take this medicine by itself. Using a long-acting bronchodilator by itself can increase your risk of a severe or fatal asthma attack.   · Do not take inhaled corticosteroids or long-acting bronchodilators to stop an asthma attack that has already started. They don't work fast enough to help. · Talk to your doctor before you use other medicines. Some medicines, such as aspirin, can cause asthma attacks in some people. Education   · Learn what triggers an asthma attack. Avoid these triggers when you can. Common triggers include colds, smoke, air pollution, dust, pollen, mold, pets, cockroaches, stress, and cold air. · Check yourself for asthma symptoms to know which step to follow in your action plan. Watch for things like being short of breath, having chest tightness, coughing, and wheezing. Also notice if symptoms wake you up at night or if you get tired quickly when you exercise. · If you have a peak flow meter, use it to check how well you are breathing. It can help you know when an asthma attack is going to occur. Then you can take medicine to prevent the asthma attack or make it less severe. · Do not smoke or allow others to smoke around you. Avoid smoky places. Smoking makes asthma worse. If you need help quitting, talk to your doctor about stop-smoking programs and medicines. These can increase your chances of quitting for good. · Avoid colds and the flu. Get a pneumococcal vaccine shot. If you have had one before, ask your doctor whether you need a second dose. Get a flu vaccine every year, as soon as it's available. If you must be around people with colds or the flu, wash your hands often. To treat attacks when they occur  Use your asthma action plan when you have an attack. Your quick-relief medicine will stop an asthma attack. It relaxes the muscles that get tight around the airways. If your doctor prescribed corticosteroid pills to use during an attack, take them as directed. They may take hours to work, but they may shorten the attack and help you breathe better. · Albuterol is an effective quick-relief inhaler. · Take your quick-relief medicine exactly as prescribed.   · Always bring your asthma medicine with you when you travel. · You may need to use quick-relief medicine before you exercise. · Call your doctor if you think you are having a problem with your medicine. When should you call for help? Call 911 anytime you think you may need emergency care. For example, call if:    · You are having severe trouble breathing. Call your doctor now or seek immediate medical care if:    · Your symptoms do not get better after you have followed your asthma action plan.     · You cough up yellow, dark brown, or bloody mucus (sputum). Watch closely for changes in your health, and be sure to contact your doctor if:    · Your coughing and wheezing get worse.     · You need to use your quick-relief medicine on more than 2 days a week within a month (unless it is just for exercise).     · You need help figuring out what is triggering your asthma attacks. Where can you learn more? Go to https://Writer's Bloq.[employer]. org and sign in to your [employer] account. Enter P265 in the [employer] box to learn more about \"Controlling Your Asthma: Care Instructions. \"     If you do not have an account, please click on the \"Sign Up Now\" link. Current as of: October 26, 2020               Content Version: 12.9  © 2006-2021 Healthwise, Incorporated. Care instructions adapted under license by [facility] ([facility]). If you have questions about a medical condition or this instruction, always ask your healthcare professional. [place] [address] any warranty or liability for your use of this information.

## 2021-09-01 ENCOUNTER — PATIENT MESSAGE (OUTPATIENT)
Dept: PRIMARY CARE CLINIC | Age: 21
End: 2021-09-01

## 2021-09-01 RX ORDER — ATORVASTATIN CALCIUM 40 MG/1
40 TABLET, FILM COATED ORAL DAILY
Qty: 30 TABLET | Refills: 3 | Status: SHIPPED | OUTPATIENT
Start: 2021-09-01 | End: 2021-11-29

## 2021-09-01 NOTE — TELEPHONE ENCOUNTER
From: Lucila Gómez  To: Harish Roca DO  Sent: 9/1/2021 9:36 AM CDT  Subject: Test Results Question    Wondering if doc seen my lab work

## 2021-09-02 ENCOUNTER — TELEPHONE (OUTPATIENT)
Dept: PRIMARY CARE CLINIC | Age: 21
End: 2021-09-02

## 2021-09-02 NOTE — TELEPHONE ENCOUNTER
----- Message from EVELYN Connell sent at 9/2/2021  1:02 PM CDT -----  Urine good no abnormal protein noted

## 2021-09-07 ENCOUNTER — TELEMEDICINE (OUTPATIENT)
Dept: PRIMARY CARE CLINIC | Age: 21
End: 2021-09-07

## 2021-09-07 DIAGNOSIS — M25.572 ACUTE LEFT ANKLE PAIN: ICD-10-CM

## 2021-09-07 DIAGNOSIS — M79.672 LEFT FOOT PAIN: ICD-10-CM
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
## 2021-09-07 DIAGNOSIS — M25.562 ACUTE PAIN OF LEFT KNEE: ICD-10-CM

## 2021-09-07 PROCEDURE — 99214 OFFICE O/P EST MOD 30 MIN: CPT | Performed by: PEDIATRICS

## 2021-09-07 RX ORDER — NAPROXEN 500 MG/1
500 TABLET ORAL 2 TIMES DAILY WITH MEALS
Qty: 60 TABLET | Refills: 0 | Status: SHIPPED | OUTPATIENT
Start: 2021-09-07 | End: 2022-04-20 | Stop reason: SDUPTHER

## 2021-09-07 ASSESSMENT — ENCOUNTER SYMPTOMS
BACK PAIN: 0
COUGH: 0
NAUSEA: 0
EYE DISCHARGE: 0
CONSTIPATION: 0
ABDOMINAL PAIN: 0
SHORTNESS OF BREATH: 0
DIARRHEA: 0
SINUS PRESSURE: 0
VOMITING: 0
SORE THROAT: 0
CHEST TIGHTNESS: 0
WHEEZING: 0

## 2021-09-07 NOTE — PATIENT INSTRUCTIONS
Patient Education        Foot Pain: Care Instructions  Your Care Instructions     Foot injuries that cause pain and swelling are fairly common. Almost all sports or home repair projects can cause a misstep that ends up as foot pain. Normal wear and tear, especially as you get older, also can cause foot pain. Most minor foot injuries will heal on their own, and home treatment is usually all you need to do. If you have a severe injury, you may need tests and treatment. Follow-up care is a key part of your treatment and safety. Be sure to make and go to all appointments, and call your doctor if you are having problems. It's also a good idea to know your test results and keep a list of the medicines you take. How can you care for yourself at home? · Take pain medicines exactly as directed. ? If the doctor gave you a prescription medicine for pain, take it as prescribed. ? If you are not taking a prescription pain medicine, ask your doctor if you can take an over-the-counter medicine. · Rest and protect your foot. Take a break from any activity that may cause pain. · Put ice or a cold pack on your foot for 10 to 20 minutes at a time. Put a thin cloth between the ice and your skin. · Prop up the sore foot on a pillow when you ice it or anytime you sit or lie down during the next 3 days. Try to keep it above the level of your heart. This will help reduce swelling. · Your doctor may recommend that you wrap your foot with an elastic bandage. Keep your foot wrapped for as long as your doctor advises. · If your doctor recommends crutches, use them as directed. · Wear roomy footwear. · As soon as pain and swelling end, begin gentle exercises of your foot. Your doctor can tell you which exercises will help. When should you call for help? Call 911 anytime you think you may need emergency care. For example, call if:    · Your foot turns pale, white, blue, or cold.    Call your doctor now or seek immediate medical care if:    · You cannot move or stand on your foot.     · Your foot looks twisted or out of its normal position.     · Your foot is not stable when you step down.     · You have signs of infection, such as:  ? Increased pain, swelling, warmth, or redness. ? Red streaks leading from the sore area. ? Pus draining from a place on your foot. ? A fever.     · Your foot is numb or tingly. Watch closely for changes in your health, and be sure to contact your doctor if:    · You do not get better as expected.     · You have bruises from an injury that last longer than 2 weeks. Where can you learn more? Go to https://[employer] TechnologypeIntelliden.ParentsWare. org and sign in to your [employer] account. Enter Q271 in the [employer] box to learn more about \"Foot Pain: Care Instructions. \"     If you do not have an account, please click on the \"Sign Up Now\" link. Current as of: November 16, 2020               Content Version: 12.9  © 2006-2021 [employer]. Care instructions adapted under license by [facility] ([facility]). If you have questions about a medical condition or this instruction, always ask your healthcare professional. [place] [address] any warranty or liability for your use of this information. Patient Education        Knee Pain or Injury: Care Instructions  Your Care Instructions     Injuries are a common cause of knee problems. Sudden (acute) injuries may be caused by a direct blow to the knee. They can also be caused by abnormal twisting, bending, or falling on the knee. Pain, bruising, or swelling may be severe, and may start within minutes of the injury. Overuse is another cause of knee pain. Other causes are climbing stairs, kneeling, and other activities that use the knee. Everyday wear and tear, especially as you get older, also can cause knee pain.   Rest, along with home treatment, often relieves pain and allows your knee to heal. If you have a serious knee injury, you may need tests and treatment. Follow-up care is a key part of your treatment and safety. Be sure to make and go to all appointments, and call your doctor if you are having problems. It's also a good idea to know your test results and keep a list of the medicines you take. How can you care for yourself at home? · Be safe with medicines. Read and follow all instructions on the label. ? If the doctor gave you a prescription medicine for pain, take it as prescribed. ? If you are not taking a prescription pain medicine, ask your doctor if you can take an over-the-counter medicine. · Rest and protect your knee. Take a break from any activity that may cause pain. · Put ice or a cold pack on your knee for 10 to 20 minutes at a time. Put a thin cloth between the ice and your skin. · Prop up a sore knee on a pillow when you ice it or anytime you sit or lie down for the next 3 days. Try to keep it above the level of your heart. This will help reduce swelling. · If your knee is not swollen, you can put moist heat, a heating pad, or a warm cloth on your knee. · If your doctor recommends an elastic bandage, sleeve, or other type of support for your knee, wear it as directed. · Follow your doctor's instructions about how much weight you can put on your leg. Use a cane, crutches, or a walker as instructed. · Follow your doctor's instructions about activity during your healing process. If you can do mild exercise, slowly increase your activity. · Reach and stay at a healthy weight. Extra weight can strain the joints, especially the knees and hips, and make the pain worse. Losing even a few pounds may help. When should you call for help? Call 911 anytime you think you may need emergency care. For example, call if:    · You have symptoms of a blood clot in your lung (called a pulmonary embolism). These may include:  ? Sudden chest pain. ? Trouble breathing. ? Coughing up blood.    Call your doctor now or seek immediate medical care if:    · You have severe or increasing pain.     · Your leg or foot turns cold or changes color.     · You cannot stand or put weight on your knee.     · Your knee looks twisted or bent out of shape.     · You cannot move your knee.     · You have signs of infection, such as:  ? Increased pain, swelling, warmth, or redness. ? Red streaks leading from the knee. ? Pus draining from a place on your knee. ? A fever.     · You have signs of a blood clot in your leg (called a deep vein thrombosis), such as:  ? Pain in your calf, back of the knee, thigh, or groin. ? Redness and swelling in your leg or groin. Watch closely for changes in your health, and be sure to contact your doctor if:    · You have tingling, weakness, or numbness in your knee.     · You have any new symptoms, such as swelling.     · You have bruises from a knee injury that last longer than 2 weeks.     · You do not get better as expected. Where can you learn more? Go to https://SkyKick.FarmaciaClub. org and sign in to your [employer] account. Enter K195 in the [employer] box to learn more about \"Knee Pain or Injury: Care Instructions. \"     If you do not have an account, please click on the \"Sign Up Now\" link. Current as of: October 19, 2020               Content Version: 12.9  © 6362-8152 ERN. Care instructions adapted under license by [facility] ([facility]). If you have questions about a medical condition or this instruction, always ask your healthcare professional. [place] [address] any warranty or liability for your use of this information. Patient Education        Ankle Sprain: Care Instructions  Your Care Instructions     An ankle sprain can happen when you twist your ankle. The ligaments that support the ankle can get stretched and torn. Often the ankle is swollen and painful.   Ankle sprains may take from several weeks to several months to heal. Usually, the more pain and swelling you have, the more severe your ankle sprain is and the longer it will take to heal. You can heal faster and regain strength in your ankle with good home treatment. It is very important to give your ankle time to heal completely, so that you do not easily hurt your ankle again. Follow-up care is a key part of your treatment and safety. Be sure to make and go to all appointments, and call your doctor if you are having problems. It's also a good idea to know your test results and keep a list of the medicines you take. How can you care for yourself at home? · Prop up your foot on pillows as much as possible for the next 3 days. Try to keep your ankle above the level of your heart. This will help reduce the swelling. · Follow your doctor's directions for wearing a splint or elastic bandage. Wrapping the ankle may help reduce or prevent swelling. · Your doctor may give you a splint, a brace, an air stirrup, or another form of ankle support to protect your ankle until it is healed. Wear it as directed while your ankle is healing. Do not remove it unless your doctor tells you to. After your ankle has healed, ask your doctor whether you should wear the brace when you exercise. · Put ice or cold packs on your injured ankle for 10 to 20 minutes at a time. Try to do this every 1 to 2 hours for the next 3 days (when you are awake) or until the swelling goes down. Put a thin cloth between the ice and your skin. · You may need to use crutches until you can walk without pain. If you do use crutches, try to bear some weight on your injured ankle if you can do so without pain. This helps the ankle heal.  · Take pain medicines exactly as directed. ? If the doctor gave you a prescription medicine for pain, take it as prescribed. ? If you are not taking a prescription pain medicine, ask your doctor if you can take an over-the-counter medicine.   · If you have been given ankle exercises to do at home, do them exactly as instructed. These can promote healing and help prevent lasting weakness. When should you call for help? Call your doctor now or seek immediate medical care if:    · Your pain is getting worse.     · Your swelling is getting worse.     · Your splint feels too tight or you are unable to loosen it. Watch closely for changes in your health, and be sure to contact your doctor if:    · You are not getting better after 1 week. Where can you learn more? Go to https://eflow.Xigen. org and sign in to your "[employer] account. Enter V901 in the [employer] box to learn more about \"Ankle Sprain: Care Instructions. \"     If you do not have an account, please click on the \"Sign Up Now\" link. Current as of: November 16, 2020               Content Version: 12.9  © 4353-8002 Healthwise, Incorporated. Care instructions adapted under license by [facility] ([facility]). If you have questions about a medical condition or this instruction, always ask your healthcare professional. [address] any warranty or liability for your use of this information.

## 2021-09-07 NOTE — PROGRESS NOTES
[address] [facility], [address]  Phone [phone]   Fax [phone]      OFFICE VISIT: 2021    Juan Orlando-: 2000      HPI  Reason For Visit:  Juan Bansal is a 24 y.o. No chief complaint on file. Pt presents via MyChart video conference. She slipped and fell last night on wet floor. She injured her Left knee and leg. She is able to bear weight. This is very painful and throbbing in her calf and foot. She has been unable to work today and tomorrow. She is having muscle cramps in her leg (left calf). vitals were not taken for this visit. There is no height or weight on file to calculate BMI. I have reviewed the following with the Ms. Orlando   Lab Review  Orders Only on 2021   Component Date Value    T4 Free 2021 1.23     TSH 2021 1.880     Cholesterol, Total 2021 197     Triglycerides 2021 102     HDL 2021 49*    LDL Calculated 2021 128     WBC 2021 6.5     RBC 2021 4.32     Hemoglobin 2021 12.4     Hematocrit 2021 40.6     MCV 2021 94.0     MCH 2021 28.7     MCHC 2021 30.5*    RDW 2021 13.9     Platelets  330     MPV 2021 9.9     Neutrophils % 2021 57.2     Lymphocytes % 2021 34.7     Monocytes % 2021 6.3     Eosinophils % 2021 1.2     Basophils % 2021 0.3     Neutrophils Absolute 2021 3.7     Immature Granulocytes # 2021 0.0     Lymphocytes Absolute 2021 2.3     Monocytes Absolute 2021 0.40     Eosinophils Absolute 2021 0.10     Basophils Absolute 2021 0.00     Sodium 2021 140     Potassium 2021 4.2     Chloride 2021 104     CO2 2021 23     Anion Gap 2021 13     Glucose 2021 95     BUN 2021 4*    CREATININE 2021 0.6     GFR Non- 2021 >60     GFR  American 08/31/2021 >59     Calcium 08/31/2021 9.5     Total Protein 08/31/2021 6.9     Albumin 08/31/2021 4.0     Total Bilirubin 08/31/2021 0.4     Alkaline Phosphatase 08/31/2021 28*    ALT 08/31/2021 12     AST 08/31/2021 15     Microalbumin, Random Uri* 09/02/2021 <1.20     Creatinine, Ur 09/02/2021 141.8     Microalbumin Creatinine * 09/02/2021 see below      Copies of these are in the chart. Current Outpatient Medications   Medication Sig Dispense Refill    naproxen (NAPROSYN) 500 MG tablet Take 1 tablet by mouth 2 times daily (with meals) 60 tablet 0    atorvastatin (LIPITOR) 40 MG tablet Take 1 tablet by mouth daily 30 tablet 3    metoprolol succinate (TOPROL XL) 25 MG extended release tablet Take 1 tablet by mouth daily 30 tablet 5    ipratropium-albuterol (DUONEB) 0.5-2.5 (3) MG/3ML SOLN nebulizer solution Use 1 vial in nebulizer every 6 hours 360 mL 1    venlafaxine (EFFEXOR XR) 150 MG extended release capsule Take 1 capsule by mouth daily 90 capsule 2    montelukast (SINGULAIR) 10 MG tablet Take 1 tablet by mouth daily 90 tablet 3    norgestimate-ethinyl estradiol (PREVIFEM) 0.25-35 MG-MCG per tablet Take 1 tablet by mouth daily 84 tablet 3    ibuprofen (ADVIL;MOTRIN) 800 MG tablet Take 1 tablet by mouth every 8 hours as needed for Pain With food or milk 120 tablet 3    hydrOXYzine (ATARAX) 25 MG tablet Take 1 tablet by mouth every 6 hours as needed for Anxiety 90 tablet 5    lamoTRIgine (LAMICTAL) 200 MG tablet Take 1 tablet by mouth 2 times daily 60 tablet 11    fluticasone (FLONASE) 50 MCG/ACT nasal spray 1 spray by Each Nostril route daily 1 Bottle 1    albuterol sulfate  (90 Base) MCG/ACT inhaler Inhale 2 puffs into the lungs every 6 hours as needed for Wheezing 1 Inhaler 5    Multiple Vitamin (MULTI-DAY VITAMINS PO) Take 1 tablet by mouth daily. No current facility-administered medications for this visit.        Allergies: Eggs or egg-derived products, Bactrim [sulfamethoxazole-trimethoprim], Other, Pcn [penicillins], Sulfa antibiotics, and Sulfur     Past Medical History:   Diagnosis Date    Asthma     History of Chiari malformation     Seizures ([address]     VSD (ventricular septal defect)        Family History   Problem Relation Age of Onset    Asthma Mother     Lupus Mother     High Blood Pressure Mother     Asthma Maternal Grandmother     Asthma Maternal Grandfather     Cancer Maternal Grandfather     Stroke Maternal Grandfather     Diabetes Maternal Grandfather     Asthma Paternal Grandmother     Stroke Paternal Grandmother     Diabetes Paternal Grandmother     Asthma Paternal Grandfather        Past Surgical History:   Procedure Laterality Date    ADENOIDECTOMY      OTHER SURGICAL HISTORY      chiari decompression    TONSILLECTOMY      TYMPANOSTOMY TUBE PLACEMENT      VENTRICULOPERITONEAL SHUNT         Social History     Tobacco Use    Smoking status: Never Smoker    Smokeless tobacco: Never Used   Substance Use Topics    Alcohol use: No        Review of Systems   Constitutional: Negative for appetite change, chills, fatigue and fever. HENT: Negative for congestion, ear discharge, ear pain, postnasal drip, sinus pressure and sore throat. Eyes: Negative for discharge and visual disturbance. Respiratory: Negative for cough, chest tightness, shortness of breath and wheezing. Asthma is stable. Cardiovascular: Negative for chest pain, palpitations and leg swelling. Blood pressure consistently running high   Gastrointestinal: Negative for abdominal pain, constipation, diarrhea, nausea and vomiting. Genitourinary: Negative for difficulty urinating, dysuria and menstrual problem. Musculoskeletal: Positive for arthralgias (left knee ankle and foot). Negative for back pain, joint swelling and myalgias. Skin: Negative for rash. Neurological: Positive for headaches ( Mostly when blood pressure is up).  Negative for dizziness and weakness. She fell but slipped on wet floor. Hematological: Negative for adenopathy. Does not bruise/bleed easily. Psychiatric/Behavioral: Negative for agitation (doing better), sleep disturbance and suicidal ideas. The patient is nervous/anxious (but tolerating). Some panic attacks       Physical Exam  PE was not done today as this was a video conference. ASSESSMENT      ICD-10-CM    1. Fall, initial encounter  [address] XXXA XR ANKLE LEFT (MIN 3 VIEWS)     XR FOOT LEFT (MIN 3 VIEWS)     naproxen (NAPROSYN) 500 MG tablet   2. Acute pain of left knee  M25.562 XR KNEE LEFT (3 VIEWS)     naproxen (NAPROSYN) 500 MG tablet   3. Acute left ankle pain  M25.572 XR KNEE LEFT (3 VIEWS)     XR ANKLE LEFT (MIN 3 VIEWS)     naproxen (NAPROSYN) 500 MG tablet   4. Left foot pain  M79.672 XR KNEE LEFT (3 VIEWS)     XR FOOT LEFT (MIN 3 VIEWS)     naproxen (NAPROSYN) 500 MG tablet         PLAN    1. Fall, initial encounter  We will put her off work for the next 2 days. We will also check x-rays. Advised her to take Aleve as this has the least blood pressure effect of any of the NSAIDs. Typical RICE therapy advised  - XR ANKLE LEFT (MIN 3 VIEWS); Future  - XR FOOT LEFT (MIN 3 VIEWS); Future    2. Acute pain of left knee  We will put her off work for the next 2 days. We will also check x-rays. Advised her to take Aleve as this has the least blood pressure effect of any of the NSAIDs. Typical RICE therapy advised  - XR KNEE LEFT (3 VIEWS); Future    3. Acute left ankle pain  We will put her off work for the next 2 days. We will also check x-rays. Advised her to take Aleve as this has the least blood pressure effect of any of the NSAIDs. Typical RICE therapy advised  - XR KNEE LEFT (3 VIEWS); Future  - XR ANKLE LEFT (MIN 3 VIEWS); Future    4. Left foot pain  We will put her off work for the next 2 days. We will also check x-rays.   Advised her to take Aleve as this has the least blood pressure effect of any of the NSAIDs. Typical RICE therapy advised  - XR KNEE LEFT (3 VIEWS); Future  - XR FOOT LEFT (MIN 3 VIEWS); Future      Orders Placed This Encounter   Procedures    XR KNEE LEFT (3 VIEWS)    XR ANKLE LEFT (MIN 3 VIEWS)    XR FOOT LEFT (MIN 3 VIEWS)        Return if symptoms worsen or fail to improve. [facility], was evaluated through a synchronous (real-time) audio-video encounter. The patient (or guardian if applicable) is aware that this is a billable service. Verbal consent to proceed has been obtained within the past 12 months. The visit was conducted pursuant to the emergency declaration under the [address] [place] [address], [address] [place] [address] authority and the [employer] General Act. Patient identification was verified, and a caregiver was present when appropriate. The patient was located in a state where the provider was credentialed to provide care. Total time spent for this encounter: 30m    --DANIELA Anderson DO on 9/7/2021 at 10:32 AM    An electronic signature was used to authenticate this note.      '

## 2021-09-07 NOTE — Clinical Note
She will need a work excuse for today and tomorrow please. Due to injury of her left knee, ankle and foot. Thanks.

## 2021-09-07 NOTE — LETTER
[address] [address]  Phone: [phone]  Fax: [phone]    DANIELA Deutsch DO        September 7, 2021     Patient: Markie Alarcon   YOB: 2000   Date of Visit: 9/7/2021       To Whom it May Concern:    Markie Alarcon was seen in my clinic on 9/7/2021. She may return to work 9/9/21, due to injury of her left knee, ankle and foot. .    If you have any questions or concerns, please don't hesitate to call. Sincerely,         DANIELA Deutsch DO

## 2021-09-09 ENCOUNTER — HOSPITAL ENCOUNTER (OUTPATIENT)
Dept: GENERAL RADIOLOGY | Age: 21
Discharge: HOME | End: 2021-09-09
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
## 2021-09-09 DIAGNOSIS — M25.572 ACUTE LEFT ANKLE PAIN: ICD-10-CM

## 2021-09-09 DIAGNOSIS — M79.672 LEFT FOOT PAIN: ICD-10-CM

## 2021-09-09 DIAGNOSIS — M25.562 ACUTE PAIN OF LEFT KNEE: ICD-10-CM

## 2021-09-09 PROCEDURE — 73630 X-RAY EXAM OF FOOT: CPT

## 2021-09-09 PROCEDURE — 73610 X-RAY EXAM OF ANKLE: CPT

## 2021-09-09 PROCEDURE — 73562 X-RAY EXAM OF KNEE 3: CPT

## 2021-10-01 ENCOUNTER — TELEMEDICINE (OUTPATIENT)
Dept: PRIMARY CARE CLINIC | Age: 21
End: 2021-10-01

## 2021-10-01 DIAGNOSIS — F41.9 ANXIETY AND DEPRESSION: ICD-10-CM

## 2021-10-01 DIAGNOSIS — E78.2 MIXED HYPERLIPIDEMIA: ICD-10-CM

## 2021-10-01 DIAGNOSIS — I10 PRIMARY HYPERTENSION: Primary | ICD-10-CM

## 2021-10-01 DIAGNOSIS — J45.20 MILD INTERMITTENT ASTHMA WITHOUT COMPLICATION: ICD-10-CM

## 2021-10-01 DIAGNOSIS — R00.2 PALPITATIONS: ICD-10-CM

## 2021-10-01 DIAGNOSIS — R51.9 NONINTRACTABLE EPISODIC HEADACHE, UNSPECIFIED HEADACHE TYPE: ICD-10-CM

## 2021-10-01 DIAGNOSIS — R00.0 TACHYCARDIA: ICD-10-CM

## 2021-10-01 DIAGNOSIS — F32.A ANXIETY AND DEPRESSION: ICD-10-CM

## 2021-10-01 PROCEDURE — 99214 OFFICE O/P EST MOD 30 MIN: CPT | Performed by: PEDIATRICS

## 2021-10-01 RX ORDER — METOPROLOL SUCCINATE 50 MG/1
50 TABLET, EXTENDED RELEASE ORAL DAILY
Qty: 30 TABLET | Refills: 11 | Status: SHIPPED | OUTPATIENT
Start: 2021-10-01 | End: 2022-01-25 | Stop reason: SDUPTHER

## 2021-10-01 ASSESSMENT — ENCOUNTER SYMPTOMS
COUGH: 0
SHORTNESS OF BREATH: 0
EYE DISCHARGE: 0
NAUSEA: 0
SINUS PRESSURE: 0
VOMITING: 0
BACK PAIN: 0
DIARRHEA: 0
WHEEZING: 0
CONSTIPATION: 0
CHEST TIGHTNESS: 0
SORE THROAT: 0
ABDOMINAL PAIN: 0

## 2021-10-01 NOTE — PROGRESS NOTES
1719 [facility], [address]  Phone [phone]   Fax [phone]      OFFICE VISIT: 10/1/2021    Sagrario Wu Darion-: 2000      HPI  Reason For Visit:  Sagrario Wu is a 22 y.o. No chief complaint on file. Patient presents via [email or web address] video conferencing on short interval follow-up for multiple health issues. Present concerns:  No significant new issues. Anxiety and depression:  Medications:              Effexor  mg daily              Hydroxyzine 25 mg daily              Lamotrigine 200 mg twice daily   Hydroxyzine 25 mg every 6 hours as needed  Symptoms:she has occasional anxiety attacks, but overall, she is stable. She has rare panic attacks.        Hypertension:   BP today was 012 systolic  BP Readings from Last 1 Encounters:   20 134/88      Recent BP readings:    BP Readings from Last 3 Encounters:   20 134/88   01/10/20 136/84   10/02/19 124/72     Medication   Metoprolol XL 25 mg daily  Medication compliance:  compliant most of the time  Home blood pressure monitoring: Yes - and controlled. She is not adherent to a low sodium diet. Symptoms: none  Laboratory:  Lab Results   Component Value Date    BUN 4 (L) 2021    CREATININE 0.6 2021       Palpitations/tachycardia:  Medication:   Toprol-XL 25 mg daily  Symptoms: she is still having some palpitations episodically. Hyperlipidemia:   Medication:   none  Low Fat, Low Choleterol Diet:  yes - she is trying  Myalgias or GI upset: no  The patient exercises intermittently.   Laboratory:    Lab Results   Component Value Date    CHOL 197 2021    TRIG 102 2021    HDL 49 (L) 2021    LDLCALC 128 2021    LDLDIRECT 106 2014      Lab Results   Component Value Date    ALT 12 2021    AST 15 2021       Headaches:  Medication:   Lamotrigine 200 mg twice daily   Toprol-XL 25 mg daily  Symptoms: doing well in this regard. Asthma:  Medication:   DuoNeb every 6 hours as needed   Albuterol HFA 2 puffs every 4 hours as needed   Singulair 10 mg daily  Symptoms: much better now and well controlled. vitals were not taken for this visit. There is no height or weight on file to calculate BMI. I have reviewed the following with the Ms. Orlando   Lab Review  Orders Only on 08/31/2021   Component Date Value    T4 Free 08/31/2021 1.23     TSH 08/31/2021 1.880     Cholesterol, Total 08/31/2021 197     Triglycerides 08/31/2021 102     HDL 08/31/2021 49*    LDL Calculated 08/31/2021 128     WBC 08/31/2021 6.5     RBC 08/31/2021 4.32     Hemoglobin 08/31/2021 12.4     Hematocrit 08/31/2021 40.6     MCV 08/31/2021 94.0     MCH 08/31/2021 28.7     MCHC 08/31/2021 30.5*    RDW 08/31/2021 13.9     Platelets 35/08/8157 330     MPV 08/31/2021 9.9     Neutrophils % 08/31/2021 57.2     Lymphocytes % 08/31/2021 34.7     Monocytes % 08/31/2021 6.3     Eosinophils % 08/31/2021 1.2     Basophils % 08/31/2021 0.3     Neutrophils Absolute 08/31/2021 3.7     Immature Granulocytes # 08/31/2021 0.0     Lymphocytes Absolute 08/31/2021 2.3     Monocytes Absolute 08/31/2021 0.40     Eosinophils Absolute 08/31/2021 0.10     Basophils Absolute 08/31/2021 0.00     Sodium 08/31/2021 140     Potassium 08/31/2021 4.2     Chloride 08/31/2021 104     CO2 08/31/2021 23     Anion Gap 08/31/2021 13     Glucose 08/31/2021 95     BUN 08/31/2021 4*    CREATININE 08/31/2021 0.6     GFR Non- 08/31/2021 >60     GFR  08/31/2021 >59     Calcium 08/31/2021 9.5     Total Protein 08/31/2021 6.9     Albumin 08/31/2021 4.0     Total Bilirubin 08/31/2021 0.4     Alkaline Phosphatase 08/31/2021 28*    ALT 08/31/2021 12     AST 08/31/2021 15     Microalbumin, Random Uri* 09/02/2021 <1.20     Creatinine, Ur 09/02/2021 141.8     Microalbumin Creatinine * 09/02/2021 see below      Copies of these are in the chart. Current Outpatient Medications   Medication Sig Dispense Refill    metoprolol succinate (TOPROL XL) 50 MG extended release tablet Take 1 tablet by mouth daily 30 tablet 11    naproxen (NAPROSYN) 500 MG tablet Take 1 tablet by mouth 2 times daily (with meals) 60 tablet 0    atorvastatin (LIPITOR) 40 MG tablet Take 1 tablet by mouth daily 30 tablet 3    ipratropium-albuterol (DUONEB) 0.5-2.5 (3) MG/3ML SOLN nebulizer solution Use 1 vial in nebulizer every 6 hours 360 mL 1    venlafaxine (EFFEXOR XR) 150 MG extended release capsule Take 1 capsule by mouth daily 90 capsule 2    montelukast (SINGULAIR) 10 MG tablet Take 1 tablet by mouth daily 90 tablet 3    norgestimate-ethinyl estradiol (PREVIFEM) 0.25-35 MG-MCG per tablet Take 1 tablet by mouth daily 84 tablet 3    ibuprofen (ADVIL;MOTRIN) 800 MG tablet Take 1 tablet by mouth every 8 hours as needed for Pain With food or milk 120 tablet 3    hydrOXYzine (ATARAX) 25 MG tablet Take 1 tablet by mouth every 6 hours as needed for Anxiety 90 tablet 5    lamoTRIgine (LAMICTAL) 200 MG tablet Take 1 tablet by mouth 2 times daily 60 tablet 11    fluticasone (FLONASE) 50 MCG/ACT nasal spray 1 spray by Each Nostril route daily 1 Bottle 1    albuterol sulfate  (90 Base) MCG/ACT inhaler Inhale 2 puffs into the lungs every 6 hours as needed for Wheezing 1 Inhaler 5    Multiple Vitamin (MULTI-DAY VITAMINS PO) Take 1 tablet by mouth daily. No current facility-administered medications for this visit.        Allergies: Eggs or egg-derived products, Bactrim [sulfamethoxazole-trimethoprim], Other, Pcn [penicillins], Sulfa antibiotics, and Sulfur     Past Medical History:   Diagnosis Date    Asthma     History of Chiari malformation     [facility]izures ([facility] [address] [facility]     VSD (ventricular septal defect)        Family History   Problem Relation Age of Onset    Asthma Mother     Lupus Mother     High Blood Pressure Mother     Asthma Maternal Grandmother  Asthma Maternal Grandfather     Cancer Maternal Grandfather     Stroke Maternal Grandfather     Diabetes Maternal Grandfather     Asthma Paternal Grandmother     Stroke Paternal Grandmother     Diabetes Paternal Grandmother     Asthma Paternal Grandfather        Past Surgical History:   Procedure Laterality Date    ADENOIDECTOMY      OTHER SURGICAL HISTORY      chiari decompression    TONSILLECTOMY      TYMPANOSTOMY TUBE PLACEMENT      VENTRICULOPERITONEAL SHUNT         Social History     Tobacco Use    Smoking status: Never Smoker    Smokeless tobacco: Never Used   Substance Use Topics    Alcohol use: No        Review of Systems   Constitutional: Negative for appetite change, chills, fatigue and fever. HENT: Negative for congestion, ear discharge, ear pain, postnasal drip, sinus pressure and sore throat. Eyes: Negative for discharge and visual disturbance. Respiratory: Negative for cough, chest tightness, shortness of breath and wheezing. Asthma is stable. Cardiovascular: Negative for chest pain, palpitations and leg swelling. Blood pressure consistently running high   Gastrointestinal: Negative for abdominal pain, constipation, diarrhea, nausea and vomiting. Genitourinary: Negative for difficulty urinating, dysuria and menstrual problem. Musculoskeletal: Positive for arthralgias (left knee ankle and foot). Negative for back pain, joint swelling and myalgias. Skin: Negative for rash. Neurological: Positive for headaches ( Mostly when blood pressure is up). Negative for dizziness and weakness. She fell but slipped on wet floor. Hematological: Negative for adenopathy. Does not bruise/bleed easily. Psychiatric/Behavioral: Negative for agitation (doing better), sleep disturbance and suicidal ideas. The patient is nervous/anxious (but tolerating).          Some panic attacks       Physical Exam  Physical exam was not performed today as this was a video teleconference visit using [employer]      ASSESSMENT      ICD-10-CM    1. Primary hypertension  I10 metoprolol succinate (TOPROL XL) 50 MG extended release tablet   2. Mixed hyperlipidemia  E78.2    3. Anxiety and depression  F41.9 metoprolol succinate (TOPROL XL) 50 MG extended release tablet    F32. A    4. Palpitations  R00.2 metoprolol succinate (TOPROL XL) 50 MG extended release tablet   5. Tachycardia  R00.0 metoprolol succinate (TOPROL XL) 50 MG extended release tablet   6. Mild intermittent asthma without complication  Y20.67    7. Nonintractable episodic headache, unspecified headache type  R51.9          PLAN    1. Primary hypertension  Going to increase her metoprolol succinate to 50 mg daily instead of 25 mg. We did discuss that this should be beta-1 selective at this dose, but she will watch for any bronchospasm symptoms. If she does experience this, we will need to back off on this medication and use some other medication to control her blood pressure  - metoprolol succinate (TOPROL XL) 50 MG extended release tablet; Take 1 tablet by mouth daily  Dispense: 30 tablet; Refill: 11    2. Mixed hyperlipidemia  She is trying to watch her diet and exercise    3. Anxiety and depression  Doing very well other than worried about her mother who is presently ill. Is possible that increase metoprolol can also help with her anxiety  - metoprolol succinate (TOPROL XL) 50 MG extended release tablet; Take 1 tablet by mouth daily  Dispense: 30 tablet; Refill: 11    4. Palpitations  Patient taken significant difference in her ongoing palpitations  - metoprolol succinate (TOPROL XL) 50 MG extended release tablet; Take 1 tablet by mouth daily  Dispense: 30 tablet; Refill: 11    5. Tachycardia  We discussed palpitations and tachycardia. Metoprolol should help with this  - metoprolol succinate (TOPROL XL) 50 MG extended release tablet; Take 1 tablet by mouth daily  Dispense: 30 tablet; Refill: 11    6.  Mild intermittent asthma without complication  Continue present asthma regimen. She will watch for any bronchospasm with increasing beta-blockade. Again, metoprolol should still be beta-1 selective at 50 mg daily    7. Nonintractable episodic headache, unspecified headache type  Doing much better from a headache standpoint as her blood pressure is getting better controlled. There may also be a benefit from the beta-blockade in that regard      No orders of the defined types were placed in this encounter. Return in about 3 months (around 1/1/2022) for [address] [place] [address], was evaluated through a synchronous (real-time) audio-video encounter. The patient (or guardian if applicable) is aware that this is a billable service. Verbal consent to proceed has been obtained within the past 12 months. The visit was conducted pursuant to the emergency declaration under the [address] [place] [address], [address] [place] [address] authority and the [employer] General Act. Patient identification was verified, and a caregiver was present when appropriate. The patient was located in a state where the provider was credentialed to provide care. Total time spent for this encounter: 30m    --DANIELA Mejia DO on 10/1/2021 at 3:56 PM    An electronic signature was used to authenticate this note.             27

## 2021-10-01 NOTE — PATIENT INSTRUCTIONS
Patient Education        Anxiety Disorder: Care Instructions  Your Care Instructions     Anxiety is a normal reaction to stress. Difficult situations can cause you to have symptoms such as sweaty palms and a nervous feeling. In an anxiety disorder, the symptoms are far more severe. Constant worry, muscle tension, trouble sleeping, nausea and diarrhea, and other symptoms can make normal daily activities difficult or impossible. These symptoms may occur for no reason, and they can affect your work, school, or social life. Medicines, counseling, and self-care can all help. Follow-up care is a key part of your treatment and safety. Be sure to make and go to all appointments, and call your doctor if you are having problems. It's also a good idea to know your test results and keep a list of the medicines you take. How can you care for yourself at home? · Take medicines exactly as directed. Call your doctor if you think you are having a problem with your medicine. · Go to your counseling sessions and follow-up appointments. · Recognize and accept your anxiety. Then, when you are in a situation that makes you anxious, say to yourself, \"This is not an emergency. I feel uncomfortable, but I am not in danger. I can keep going even if I feel anxious. \"  · Be kind to your body:  ? Relieve tension with exercise or a massage. ? Get enough rest.  ? Avoid alcohol, caffeine, nicotine, and illegal drugs. They can increase your anxiety level and cause sleep problems. ? Learn and do relaxation techniques. See below for more about these techniques. · Engage your mind. Get out and do something you enjoy. Go to a funny movie, or take a walk or hike. Plan your day. Having too much or too little to do can make you anxious. · Keep a record of your symptoms. Discuss your fears with a good friend or family member, or join a support group for people with similar problems. Talking to others sometimes relieves stress.   · Get involved in social groups, or volunteer to help others. Being alone sometimes makes things seem worse than they are. · Get at least 30 minutes of exercise on most days of the week to relieve stress. Walking is a good choice. You also may want to do other activities, such as running, swimming, cycling, or playing tennis or team sports. Relaxation techniques  Do relaxation exercises 10 to 20 minutes a day. You can play soothing, relaxing music while you do them, if you wish. · Tell others in your house that you are going to do your relaxation exercises. Ask them not to disturb you. · Find a comfortable place, away from all distractions and noise. · Lie down on your back, or sit with your back straight. · Focus on your breathing. Make it slow and steady. · Breathe in through your nose. Breathe out through either your nose or mouth. · Breathe deeply, filling up the area between your navel and your rib cage. Breathe so that your belly goes up and down. · Do not hold your breath. · Breathe like this for 5 to 10 minutes. Notice the feeling of calmness throughout your whole body. As you continue to breathe slowly and deeply, relax by doing the following for another 5 to 10 minutes:  · Tighten and relax each muscle group in your body. You can begin at your toes and work your way up to your head. · Imagine your muscle groups relaxing and becoming heavy. · Empty your mind of all thoughts. · Let yourself relax more and more deeply. · Become aware of the state of calmness that surrounds you. · When your relaxation time is over, you can bring yourself back to alertness by moving your fingers and toes and then your hands and feet and then stretching and moving your entire body. Sometimes people fall asleep during relaxation, but they usually wake up shortly afterward. · Always give yourself time to return to full alertness before you drive a car or do anything that might cause an accident if you are not fully alert.  Never play a relaxation tape while you drive a car. When should you call for help? Call 911 anytime you think you may need emergency care. For example, call if:    · You feel you cannot stop from hurting yourself or someone else. Keep the numbers for these national suicide hotlines: 7-546-204-TALK (9-519.662.3957) and 8-984-WBDSVTH (5-850.128.8743). If you or someone you know talks about suicide or feeling hopeless, get help right away. Watch closely for changes in your health, and be sure to contact your doctor if:    · You have anxiety or fear that affects your life.     · You have symptoms of anxiety that are new or different from those you had before. Where can you learn more? Go to https://Dextr.Tandem Diabetes Care. org and sign in to your Clarus Systems account. Enter P754 in the PrintLess Plans box to learn more about \"Anxiety Disorder: Care Instructions. \"     If you do not have an account, please click on the \"Sign Up Now\" link. Current as of: September 23, 2020               Content Version: 12.9  © 2006-2021 [employer]mn[employer]. Care instructions adapted under license by [facility] ([facility]). If you have questions about a medical condition or this instruction, always ask your healthcare professional. [address] any warranty or liability for your use of this information. Patient Education        Depression Treatment: Care Instructions  Your Care Instructions     Depression is a condition that affects the way you feel, think, and act. It causes symptoms such as low energy, loss of interest in daily activities, and sadness or grouchiness that goes on for a long time. Depression is very common and affects men and women of all ages. Depression is a medical illness caused by changes in the natural chemicals in your brain. It is not a character flaw, and it does not mean that you are a bad or weak person. It does not mean that you are going crazy.   It is important to know that depression can be treated. Medicines, counseling, and self-care can all help. Many people do not get help because they are embarrassed or think that they will get over the depression on their own. But some people do not get better without treatment. Follow-up care is a key part of your treatment and safety. Be sure to make and go to all appointments, and call your doctor if you are having problems. It's also a good idea to know your test results and keep a list of the medicines you take. How can you care for yourself at home? Learn about antidepressant medicines  Antidepressant medicines can improve or end the symptoms of depression. You may need to take the medicine for at least 6 months, and often longer. Keep taking your medicine even if you feel better. If you stop taking it too soon, your symptoms may come back or get worse. You may start to feel better within 1 to 3 weeks of taking antidepressant medicine. But it can take as many as 6 to 8 weeks to see more improvement. Talk to your doctor if you have problems with your medicine or if you do not notice any improvement after 3 weeks. Antidepressants can make you feel tired, dizzy, or nervous. Some people have dry mouth, constipation, headaches, sexual problems, an upset stomach, or diarrhea. Many of these side effects are mild and go away on their own after you take the medicine for a few weeks. Some may last longer. Talk to your doctor if side effects bother you too much. You might be able to try a different medicine. If you are pregnant or breastfeeding, talk to your doctor about what medicines you can take. Learn about counseling  In many cases, counseling can work as well as medicines to treat mild to moderate depression. Counseling is done by licensed mental health providers, such as psychologists, social workers, and some types of nurses. It can be done in one-on-one sessions or in a group setting.  Many people find group sessions helpful. Cognitive-behavioral therapy is a type of counseling. In this treatment, you learn how to see and change unhelpful thinking styles that may be adding to your depression. Counseling and medicines often work well when used together. When should you call for help? Call 911 anytime you think you may need emergency care. For example, call if:    · You feel you cannot stop from hurting yourself or someone else. Call your doctor now or seek immediate medical care if:    · You hear voices.     · You feel much more depressed. Watch closely for changes in your health, and be sure to contact your doctor if:    · You are having problems with your depression medicine.     · You are not getting better as expected. Where can you learn more? Go to https://Collecta.MacuLogix. org and sign in to your RobotsAlive account. Enter X864 in the ByeCity box to learn more about \"Depression Treatment: Care Instructions. \"     If you do not have an account, please click on the \"Sign Up Now\" link. Current as of: June 16, 2021               Content Version: 13.0  © 7782-4505 Healthwise, [employer]ib[employer]. Care instructions adapted under license by [facility] ([facility]). If you have questions about a medical condition or this instruction, always ask your healthcare professional. [place] [address] any warranty or liability for your use of this information. Patient Education        Recovering From Depression: Care Instructions  Your Care Instructions     Taking good care of yourself is important as you recover from depression. In time, your symptoms will fade as your treatment takes hold. Do not give up. Instead, focus your energy on getting better. Your mood will improve. It just takes some time. Focus on things that can help you feel better, such as being with friends and family, eating well, and getting enough rest. But take things slowly. Do not do too much too soon.  You will begin to feel better gradually. Follow-up care is a key part of your treatment and safety. Be sure to make and go to all appointments, and call your doctor if you are having problems. It's also a good idea to know your test results and keep a list of the medicines you take. How can you care for yourself at home? Be realistic  · If you have a large task to do, break it up into smaller steps you can handle, and just do what you can. · You may want to put off important decisions until your depression has lifted. If you have plans that will have a major impact on your life, such as marriage, divorce, or a job change, try to wait a bit. Talk it over with friends and loved ones who can help you look at the overall picture first.  · Reaching out to people for help is important. Do not isolate yourself. Let your family and friends help you. Find someone you can trust and confide in, and talk to that person. · Be patient, and be kind to yourself. Remember that depression is not your fault and is not something you can overcome with willpower alone. Treatment is important for depression, just like for any other illness. Feeling better takes time, and your mood will improve little by little. Stay active  · Stay busy and get outside. Take a walk, or try some other light exercise. · Talk with your doctor about an exercise program. Exercise can help with mild depression. · Go to a movie or concert. Take part in a Adventist activity or other social gathering. Go to a ball game. · Ask a friend to have dinner with you. Take care of yourself  · Eat a balanced diet with plenty of fresh fruits and vegetables, whole grains, and lean protein. If you have lost your appetite, eat small snacks rather than large meals. · Avoid using illegal drugs or marijuana and drinking alcohol. Do not take medicines that have not been prescribed for you.  They may interfere with medicines you may be taking for depression, or they may make your depression worse.  · Take your medicines exactly as they are prescribed. You may start to feel better within 1 to 3 weeks of taking antidepressant medicine. But it can take as many as 6 to 8 weeks to see more improvement. If you have questions or concerns about your medicines, or if you do not notice any improvement by 3 weeks, talk to your doctor. · Continue to take your medicine after your symptoms improve. Taking your medicine for at least 6 months after you feel better can help keep you from getting depressed again. If this isn't the first time you have been depressed, your doctor may recommend you to take medicine even longer. · If you have any side effects from your medicine, tell your doctor. Many side effects are mild and will go away on their own after you have been taking the medicine for a few weeks. Some may last longer. Talk to your doctor if side effects are bothering you too much. You might be able to try a different medicine. · Continue counseling. It may help prevent depression from returning, especially if you've had multiple episodes of depression. Talk with your counselor if you are having a hard time attending your sessions or you think the sessions aren't working. Don't just stop going. · Get enough sleep. Talk to your doctor if you are having problems sleeping. · Avoid sleeping pills unless they are prescribed by the doctor treating your depression. Sleeping pills may make you groggy during the day, and they may interact with other medicine you are taking. · If you have any other illnesses, such as diabetes, heart disease, or high blood pressure, make sure to continue with your treatment. Tell your doctor about all of the medicines you take, including those with or without a prescription. · If you or someone you know talks about suicide, self-harm, or feeling hopeless, get help right away.  Call the [facility] [address] [place] [facility] at 1-800-273-talk (9-598.563.3813) or text HOME to 282600 to access the [facility]. Consider saving these numbers in your phone. When should you call for help? Call 911 anytime you think you may need emergency care. For example, call if:    · You feel like hurting yourself or someone else.     · Someone you know has depression and is about to attempt or is attempting suicide. Call your doctor now or seek immediate medical care if:    · You hear voices.     · Someone you know has depression and:  ? Starts to give away his or her possessions. ? Uses illegal drugs or drinks alcohol heavily. ? Talks or writes about death, including writing suicide notes or talking about guns, knives, or pills. ? Starts to spend a lot of time alone. ? Acts very aggressively or suddenly appears calm. Watch closely for changes in your health, and be sure to contact your doctor if:    · You do not get better as expected. Where can you learn more? Go to https://Karos HealthpeM/A-COMeb.Pluralsight. org and sign in to your A+ Network account. Enter L205 in the Bolooka.com box to learn more about \"Recovering From Depression: Care Instructions. \"     If you do not have an account, please click on the \"Sign Up Now\" link. Current as of: June 16, 2021               Content Version: 13.0  © 2006-2021 [employer]. Care instructions adapted under license by [facility] ([facility]). If you have questions about a medical condition or this instruction, always ask your healthcare professional. [place] [address] any warranty or liability for your use of this information. Patient Education        Asthma in Adults: Care Instructions  Overview     Asthma makes it hard for you to breathe. During an asthma attack, the airways swell and narrow. Severe asthma attacks can be dangerous, but you can usually prevent them. Controlling asthma and treating symptoms before they get bad can help you avoid bad attacks. You may also avoid future trips to the doctor.   Follow-up care is a key part of your treatment and safety. Be sure to make and go to all appointments, and call your doctor if you are having problems. It's also a good idea to know your test results and keep a list of the medicines you take. How can you care for yourself at home? · Follow your asthma action plan so you can manage your symptoms at home. An asthma action plan will help you prevent and control airway reactions and will tell you what to do during an asthma attack. If you do not have an asthma action plan, work with your doctor to build one. · Take your asthma medicine exactly as prescribed. Medicine plays an important role in controlling asthma. Talk to your doctor right away if you have any questions about what to take and how to take it. ? Use your quick-relief medicine when you have symptoms of an asthma attack. Some people need to use quick-relief medicine before they exercise to prevent asthma symptoms. Albuterol is a quick-relief medicine that is often used. In some cases, a certain type of controller inhaler is used as a quick-relief medicine. Ask your doctor what to use for quick relief. ? Take your controller medicine. If you have symptoms often, you will likely need to take it every day. Controller medicine usually includes an inhaled corticosteroid. The goal is to prevent problems before they occur. ? If your doctor prescribed corticosteroid pills to use during an attack, take them as directed. They may take hours to work, but they may shorten the attack and help you breathe better. ? Keep your quick-relief medicine with you at all times. · Talk to your doctor before using other medicines. Some medicines, such as aspirin, can cause asthma attacks in some people. · Check yourself for asthma symptoms to know which step to follow in your action plan. Watch for things like being short of breath, having chest tightness, coughing, and wheezing.  Also notice if symptoms wake you up at night or if you get tired quickly when you exercise. · If you have a peak flow meter, use it to check how well you are breathing. This can help you predict when an asthma attack is going to occur. Then you can take medicine to prevent the asthma attack or make it less severe. · See your doctor regularly. These visits will help you learn more about asthma and what you can do to control it. Your doctor will monitor your treatment to make sure the medicine is helping you. · Keep track of your asthma attacks and your treatment. After you have had an attack, write down what triggered it, what helped end it, and any concerns you have about your asthma action plan. Take your diary when you see your doctor. You can then review your asthma action plan and decide if it is working. · Do not smoke or allow others to smoke around you. Avoid smoky places. Smoking makes asthma worse. If you need help quitting, talk to your doctor about stop-smoking programs and medicines. These can increase your chances of quitting for good. · Learn what triggers an asthma attack for you, and avoid the triggers when you can. Common triggers include colds, smoke, air pollution, dust, pollen, mold, pets, cockroaches, stress, and cold air. · Avoid colds and the flu. Talk to your doctor about getting a pneumococcal vaccine shot. If you have had one before, ask your doctor whether you need a second dose. Get a flu vaccine every fall. If you must be around people with colds or the flu, wash your hands often. When should you call for help? Call 911 anytime you think you may need emergency care. For example, call if:    · You have severe trouble breathing. Call your doctor now or seek immediate medical care if:    · Your symptoms do not get better after you have followed your asthma action plan.     · You cough up yellow, dark brown, or bloody mucus (sputum).    Watch closely for changes in your health, and be sure to contact your doctor if:    · Your coughing and wheezing get worse.     · You need to use quick-relief medicine on more than 2 days a week within a month (unless it is just for exercise).     · You need help figuring out what is triggering your asthma attacks. Where can you learn more? Go to https://trevor.Bettyvision. org and sign in to your Innovate/Protect account. Enter P597 in the Trendalytics box to learn more about \"Asthma in Adults: Care Instructions. \"     If you do not have an account, please click on the \"Sign Up Now\" link. Current as of: July 6, 2021               Content Version: 13.0  © 5163-3001 [employer]. Care instructions adapted under license by [facility]. If you have questions about a medical condition or this instruction, always ask your healthcare professional. [address] any warranty or liability for your use of this information. Patient Education        High Blood Pressure: Care Instructions  Overview     It's normal for blood pressure to go up and down throughout the day. But if it stays up, you have high blood pressure. Another name for high blood pressure is hypertension. Despite what a lot of people think, high blood pressure usually doesn't cause headaches or make you feel dizzy or lightheaded. It usually has no symptoms. But it does increase your risk of stroke, heart attack, and other problems. You and your doctor will talk about your risks of these problems based on your blood pressure. Your doctor will give you a goal for your blood pressure. Your goal will be based on your health and your age. Lifestyle changes, such as eating healthy and being active, are always important to help lower blood pressure. You might also take medicine to reach your blood pressure goal.  Follow-up care is a key part of your treatment and safety. Be sure to make and go to all appointments, and call your doctor if you are having problems.  It's also a good idea to know your test results and keep a list of the medicines you take. How can you care for yourself at home? Medical treatment  · If you stop taking your medicine, your blood pressure will go back up. You may take one or more types of medicine to lower your blood pressure. Be safe with medicines. Take your medicine exactly as prescribed. Call your doctor if you think you are having a problem with your medicine. · Talk to your doctor before you start taking aspirin every day. Aspirin can help certain people lower their risk of a heart attack or stroke. But taking aspirin isn't right for everyone, because it can cause serious bleeding. · See your doctor regularly. You may need to see the doctor more often at first or until your blood pressure comes down. · If you are taking blood pressure medicine, talk to your doctor before you take decongestants or anti-inflammatory medicine, such as ibuprofen. Some of these medicines can raise blood pressure. · Learn how to check your blood pressure at home. Lifestyle changes  · Stay at a healthy weight. This is especially important if you put on weight around the waist. Losing even 10 pounds can help you lower your blood pressure. · If your doctor recommends it, get more exercise. Walking is a good choice. Bit by bit, increase the amount you walk every day. Try for at least 30 minutes on most days of the week. You also may want to swim, bike, or do other activities. · Avoid or limit alcohol. Talk to your doctor about whether you can drink any alcohol. · Try to limit how much sodium you eat to less than 2,300 milligrams (mg) a day. Your doctor may ask you to try to eat less than 1,500 mg a day. · Eat plenty of fruits (such as bananas and oranges), vegetables, legumes, whole grains, and low-fat dairy products. · Lower the amount of saturated fat in your diet. Saturated fat is found in animal products such as milk, cheese, and meat.  Limiting these foods may help you lose weight and also lower your risk for heart disease. · Do not smoke. Smoking increases your risk for heart attack and stroke. If you need help quitting, talk to your doctor about stop-smoking programs and medicines. These can increase your chances of quitting for good. When should you call for help? Call 911  anytime you think you may need emergency care. This may mean having symptoms that suggest that your blood pressure is causing a serious heart or blood vessel problem. Your blood pressure may be over 180/120. For example, call 911 if:    · You have symptoms of a heart attack. These may include:  ? Chest pain or pressure, or a strange feeling in the chest.  ? Sweating. ? Shortness of breath. ? Nausea or vomiting. ? Pain, pressure, or a strange feeling in the back, neck, jaw, or upper belly or in one or both shoulders or arms. ? Lightheadedness or sudden weakness. ? A fast or irregular heartbeat.     · You have symptoms of a stroke. These may include:  ? Sudden numbness, tingling, weakness, or loss of movement in your face, arm, or leg, especially on only one side of your body. ? Sudden vision changes. ? Sudden trouble speaking. ? Sudden confusion or trouble understanding simple statements. ? Sudden problems with walking or balance. ? A sudden, severe headache that is different from past headaches.     · You have severe back or belly pain. Do not wait until your blood pressure comes down on its own. Get help right away. Call your doctor now or seek immediate care if:    · Your blood pressure is much higher than normal (such as 180/120 or higher), but you don't have symptoms.     · You think high blood pressure is causing symptoms, such as:  ? Severe headache.  ? Blurry vision. Watch closely for changes in your health, and be sure to contact your doctor if:    · Your blood pressure measures higher than your doctor recommends at least 2 times.  That means the top number is higher or the bottom number is higher, or both.     · You think you may be having side effects from your blood pressure medicine. Where can you learn more? Go to https://chpepiceweb.[email or web address] org and sign in to your CodeStreet account. Enter B477 in the [facility] box to learn more about \"High Blood Pressure: Care Instructions. \"     If you do not have an account, please click on the \"Sign Up Now\" link. Current as of: April 29, 2021               Content Version: 13.0  © 2006-2021 [employer]. Care instructions adapted under license by [facility] ([facility]). If you have questions about a medical condition or this instruction, always ask your healthcare professional. [place] [address] any warranty or liability for your use of this information. Patient Education        Learning About High Blood Pressure  What is high blood pressure? Blood pressure is a measure of how hard the blood pushes against the walls of your arteries. It's normal for blood pressure to go up and down throughout the day. But if it stays up, you have high blood pressure. Another name for high blood pressure is hypertension. Two numbers tell you your blood pressure. The first number is the systolic pressure (top number). It shows how hard the blood pushes when your heart is pumping. The second number is the diastolic pressure (bottom number). It shows how hard the blood pushes between heartbeats, when your heart is relaxed and filling with blood. Your doctor will give you a goal for your blood pressure based on your health and your age. High blood pressure (hypertension) means that the top number stays high, or the bottom number stays high, or both. High blood pressure increases the risk of stroke, heart attack, and other problems. What happens when you have high blood pressure? · Blood flows through your arteries with too much force. Over time, this can damage the heart and the walls of your arteries. But you can't feel it.  High blood pressure usually doesn't cause symptoms. · High blood pressure makes your heart work harder. And that can lead to heart failure, which means your heart doesn't pump as much blood as your body needs. · Fat and calcium start to build up in your arteries. This buildup is called hardening of the arteries. It can cause many problems including a heart attack and stroke. · Arteries also carry blood and oxygen to organs like your eyes, kidneys, and brain. If high blood pressure damages those arteries, it can lead to vision loss, kidney disease, stroke, and a higher risk of dementia. How can you prevent high blood pressure? · Stay at a healthy weight. · Try to limit how much sodium you eat to less than 2,300 milligrams (mg) a day. If you limit your sodium to 1,500 mg a day, you can lower your blood pressure even more. ? Buy foods that are labeled \"unsalted,\" \"sodium-free,\" or \"low-sodium. \" Foods labeled \"reduced-sodium\" and \"light sodium\" may still have too much sodium. ? Flavor your food with garlic, lemon juice, onion, vinegar, herbs, and spices instead of salt. Do not use soy sauce, steak sauce, onion salt, garlic salt, mustard, or ketchup on your food. ? Use less salt (or none) when recipes call for it. You can often use half the salt a recipe calls for without losing flavor. · Be physically active. Get at least 30 minutes of exercise on most days of the week. Walking is a good choice. You also may want to do other activities, such as running, swimming, cycling, or playing tennis or team sports. · Limit alcohol to 2 drinks a day for men and 1 drink a day for women. · Eat plenty of fruits, vegetables, and low-fat dairy products. Eat less saturated and total fats. How is high blood pressure treated? · Your doctor will suggest making lifestyle changes to help your heart. For example, your doctor may ask you to eat healthy foods, quit smoking, lose extra weight, and be more active.   · If lifestyle changes don't help enough, your doctor may recommend that you take medicine. · When blood pressure is very high, medicines are needed to lower it. Follow-up care is a key part of your treatment and safety. Be sure to make and go to all appointments, and call your doctor if you are having problems. It's also a good idea to know your test results and keep a list of the medicines you take. Where can you learn more? Go to https://[email or web address] Systemseb.[email or web address] org and sign in to your Aula 7 account. Enter P501 in the [facility] box to learn more about \"Learning About High Blood Pressure. \"     If you do not have an account, please click on the \"Sign Up Now\" link. Current as of: April 29, 2021               Content Version: 13.0  © 5227-1713 Healthwise, [employer]orporated. Care instructions adapted under license by [facility] ([facility]). If you have questions about a medical condition or this instruction, always ask your healthcare professional. [place] [address] any warranty or liability for your use of this information.

## 2021-10-25 RX ORDER — AMLODIPINE BESYLATE 5 MG/1
5 TABLET ORAL DAILY
Qty: 30 TABLET | Refills: 5 | Status: SHIPPED | OUTPATIENT
Start: 2021-10-25 | End: 2022-05-17

## 2021-11-01 ENCOUNTER — TELEMEDICINE (OUTPATIENT)
Dept: PRIMARY CARE CLINIC | Age: 21
End: 2021-11-01

## 2021-11-01 DIAGNOSIS — I10 PRIMARY HYPERTENSION: Primary | ICD-10-CM

## 2021-11-01 DIAGNOSIS — K21.9 GASTROESOPHAGEAL REFLUX DISEASE, UNSPECIFIED WHETHER ESOPHAGITIS PRESENT: ICD-10-CM

## 2021-11-01 DIAGNOSIS — F32.A ANXIETY AND DEPRESSION: ICD-10-CM

## 2021-11-01 DIAGNOSIS — G89.29 CHRONIC PAIN OF BOTH KNEES: ICD-10-CM

## 2021-11-01 DIAGNOSIS — J45.20 MILD INTERMITTENT ASTHMA WITHOUT COMPLICATION: ICD-10-CM

## 2021-11-01 DIAGNOSIS — M25.561 CHRONIC PAIN OF BOTH KNEES: ICD-10-CM

## 2021-11-01 DIAGNOSIS — M25.562 CHRONIC PAIN OF BOTH KNEES: ICD-10-CM

## 2021-11-01 DIAGNOSIS — F41.9 ANXIETY AND DEPRESSION: ICD-10-CM

## 2021-11-01 PROCEDURE — 99214 OFFICE O/P EST MOD 30 MIN: CPT | Performed by: PEDIATRICS

## 2021-11-01 ASSESSMENT — ENCOUNTER SYMPTOMS
DIARRHEA: 0
CONSTIPATION: 0
COUGH: 0
SHORTNESS OF BREATH: 0
BACK PAIN: 0
NAUSEA: 0
EYE DISCHARGE: 0
SINUS PRESSURE: 0
ABDOMINAL PAIN: 0
CHEST TIGHTNESS: 0
WHEEZING: 0
VOMITING: 0
SORE THROAT: 0

## 2021-11-01 NOTE — PROGRESS NOTES
[address] [facility], [address]  Phone [phone]   Fax [phone]      OFFICE VISIT: 2021    Nohemy Mcmahant-: 2000      HPI  Reason For Visit:  Nohemy Hardy is a 22 y.o. Anxiety and Hypertension       Anxiety and depression:  Medications:              Effexor  mg daily              Hydroxyzine 25 mg daily              Lamotrigine 200 mg twice daily              Hydroxyzine 25 mg every 6 hours as needed  Symptoms:she has occasional anxiety attacks, but overall, she is stable. She has rare panic attacks.        Hypertension:   BP today was   BP Readings from Last 1 Encounters:   20 134/88      Recent BP readings:    BP Readings from Last 3 Encounters:   20 134/88   01/10/20 136/84   10/02/19 124/72     Medication   Toprol-XL 50 mg daily   Amlodipine 5 mg daily (she has not picked this up yet)  Medication compliance:  compliant most of the time  Home blood pressure monitoring: Yes - but remaining elevated. She Is somewhat adherent to a low sodium diet. Symptoms: headaches. Laboratory:  Lab Results   Component Value Date    BUN 4 (L) 2021    CREATININE 0.6 2021       Knee pain:  Her knee has been flared up recently. This is very sore. She is taking Motrin 800mg tid       GERD:  She is having periodic heart burn episodes. Medication:    None. Symptoms:  She is symptomatic as above. Situational Depression:  She is very upset about her father seemingly disregarding her. She is \"broken by this\"       vitals were not taken for this visit. There is no height or weight on file to calculate BMI. I have reviewed the following with the Ms. Orlando   Lab Review  Orders Only on 2021   Component Date Value    T4 Free 2021 1.23     TSH 2021 1.880     Cholesterol, Total 2021 197     Triglycerides 2021 102     HDL 2021 49*    LDL Calculated 2021 128     WBC 2021 6.5  RBC 08/31/2021 4.32     Hemoglobin 08/31/2021 12.4     Hematocrit 08/31/2021 40.6     MCV 08/31/2021 94.0     MCH 08/31/2021 28.7     MCHC 08/31/2021 30.5*    RDW 08/31/2021 13.9     Platelets 47/78/7705 330     MPV 08/31/2021 9.9     Neutrophils % 08/31/2021 57.2     Lymphocytes % 08/31/2021 34.7     Monocytes % 08/31/2021 6.3     Eosinophils % 08/31/2021 1.2     Basophils % 08/31/2021 0.3     Neutrophils Absolute 08/31/2021 3.7     Immature Granulocytes # 08/31/2021 0.0     Lymphocytes Absolute 08/31/2021 2.3     Monocytes Absolute 08/31/2021 0.40     Eosinophils Absolute 08/31/2021 0.10     Basophils Absolute 08/31/2021 0.00     Sodium 08/31/2021 140     Potassium 08/31/2021 4.2     Chloride 08/31/2021 104     CO2 08/31/2021 23     Anion Gap 08/31/2021 13     Glucose 08/31/2021 95     BUN 08/31/2021 4*    CREATININE 08/31/2021 0.6     GFR Non- 08/31/2021 >60     GFR  08/31/2021 >59     Calcium 08/31/2021 9.5     Total Protein 08/31/2021 6.9     Albumin 08/31/2021 4.0     Total Bilirubin 08/31/2021 0.4     Alkaline Phosphatase 08/31/2021 28*    ALT 08/31/2021 12     AST 08/31/2021 15     Microalbumin, Random Uri* 09/02/2021 <1.20     Creatinine, Ur 09/02/2021 141.8     Microalbumin Creatinine * 09/02/2021 see below      Copies of these are in the chart.     Current Outpatient Medications   Medication Sig Dispense Refill    amLODIPine (NORVASC) 5 MG tablet Take 1 tablet by mouth daily 30 tablet 5    metoprolol succinate (TOPROL XL) 50 MG extended release tablet Take 1 tablet by mouth daily 30 tablet 11    naproxen (NAPROSYN) 500 MG tablet Take 1 tablet by mouth 2 times daily (with meals) 60 tablet 0    atorvastatin (LIPITOR) 40 MG tablet Take 1 tablet by mouth daily 30 tablet 3    ipratropium-albuterol (DUONEB) 0.5-2.5 (3) MG/3ML SOLN nebulizer solution Use 1 vial in nebulizer every 6 hours 360 mL 1    venlafaxine (EFFEXOR XR) 150 MG extended release capsule Take 1 capsule by mouth daily 90 capsule 2    montelukast (SINGULAIR) 10 MG tablet Take 1 tablet by mouth daily 90 tablet 3    norgestimate-ethinyl estradiol (PREVIFEM) 0.25-35 MG-MCG per tablet Take 1 tablet by mouth daily 84 tablet 3    ibuprofen (ADVIL;MOTRIN) 800 MG tablet Take 1 tablet by mouth every 8 hours as needed for Pain With food or milk 120 tablet 3    hydrOXYzine (ATARAX) 25 MG tablet Take 1 tablet by mouth every 6 hours as needed for Anxiety 90 tablet 5    lamoTRIgine (LAMICTAL) 200 MG tablet Take 1 tablet by mouth 2 times daily 60 tablet 11    fluticasone (FLONASE) 50 MCG/ACT nasal spray 1 spray by Each Nostril route daily 1 Bottle 1    albuterol sulfate  (90 Base) MCG/ACT inhaler Inhale 2 puffs into the lungs every 6 hours as needed for Wheezing 1 Inhaler 5    Multiple Vitamin (MULTI-DAY VITAMINS PO) Take 1 tablet by mouth daily. No current facility-administered medications for this visit.        Allergies: Eggs or egg-derived products, Bactrim [sulfamethoxazole-trimethoprim], Other, Pcn [penicillins], Sulfa antibiotics, and Sulfur     Past Medical History:   Diagnosis Date    Asthma     History of Chiari malformation     Seizures ([facility] [address]     VSD (ventricular septal defect)        Family History   Problem Relation Age of Onset    Asthma Mother     Lupus Mother     High Blood Pressure Mother     Asthma Maternal Grandmother     Asthma Maternal Grandfather     Cancer Maternal Grandfather     Stroke Maternal Grandfather     Diabetes Maternal Grandfather     Asthma Paternal Grandmother     Stroke Paternal Grandmother     Diabetes Paternal Grandmother     Asthma Paternal Grandfather        Past Surgical History:   Procedure Laterality Date    ADENOIDECTOMY      OTHER SURGICAL HISTORY      chiari decompression    TONSILLECTOMY      TYMPANOSTOMY TUBE PLACEMENT      VENTRICULOPERITONEAL SHUNT         Social History     Tobacco Use    Smoking status: Never Smoker    Smokeless tobacco: Never Used   Substance Use Topics    Alcohol use: No        Review of Systems   Constitutional: Negative for appetite change, chills, fatigue and fever. HENT: Negative for congestion, ear discharge, ear pain, postnasal drip, sinus pressure and sore throat. Eyes: Negative for discharge and visual disturbance. Respiratory: Negative for cough, chest tightness, shortness of breath and wheezing. Asthma is stable. Cardiovascular: Negative for chest pain, palpitations and leg swelling. Blood pressure consistently running high   Gastrointestinal: Negative for abdominal pain, constipation, diarrhea, nausea and vomiting. Genitourinary: Negative for difficulty urinating, dysuria and menstrual problem. Musculoskeletal: Positive for arthralgias (left knee ankle and foot). Negative for back pain, joint swelling and myalgias. Skin: Negative for rash. Neurological: Positive for headaches ( Mostly when blood pressure is up). Negative for dizziness and weakness. She fell but slipped on wet floor. Hematological: Negative for adenopathy. Does not bruise/bleed easily. Psychiatric/Behavioral: Negative for agitation (doing better), sleep disturbance and suicidal ideas. The patient is nervous/anxious (but tolerating). Some panic attacks       Physical Exam  Physical exam was not performed today as this was a MyChart video conference      ASSESSMENT      ICD-10-CM    1. Primary hypertension  I10    2. Mild intermittent asthma without complication  Q81.74    3. Anxiety and depression  F41.9     F32. A    4. Gastroesophageal reflux disease, unspecified whether esophagitis present  K21.9    5. Chronic pain of both knees  M25.561     M25.562     G89.29          PLAN    1. Primary hypertension  We need to get her medication for her. She will  the medication when she can afford it.     2. Mild intermittent asthma without complication  Stable now    3. Anxiety and depression  She does not need any medication     4. Gastroesophageal reflux disease, unspecified whether esophagitis present  Will have samples of omeprazole    5. Chronic pain of both knees  She is taking Ibuprofen 800mg tid. No orders of the defined types were placed in this encounter. Return in about 3 months (around 2/1/2022) for 30. [facility], was evaluated through a synchronous (real-time) audio-video encounter. The patient (or guardian if applicable) is aware that this is a billable service. Verbal consent to proceed has been obtained within the past 12 months. The visit was conducted pursuant to the emergency declaration under the [facility], [address] [place] [address] authority and the [employer] General Act. Patient identification was verified, and a caregiver was present when appropriate. The patient was located in a state where the provider was credentialed to provide care. Total time spent for this encounter: 30m    --DANIELA Mejia DO on 11/1/2021 at 5:11 PM    An electronic signature was used to authenticate this note.

## 2021-11-23 ENCOUNTER — PATIENT MESSAGE (OUTPATIENT)
Dept: PRIMARY CARE CLINIC | Age: 21
End: 2021-11-23

## 2021-11-23 RX ORDER — HYDROXYZINE HYDROCHLORIDE 25 MG/1
25 TABLET, FILM COATED ORAL NIGHTLY
Qty: 30 TABLET | Refills: 0 | Status: SHIPPED | OUTPATIENT
Start: 2021-11-23 | End: 2021-12-23

## 2021-11-23 RX ORDER — BUSPIRONE HYDROCHLORIDE 5 MG/1
5 TABLET ORAL 2 TIMES DAILY
Qty: 60 TABLET | Refills: 0 | Status: SHIPPED | OUTPATIENT
Start: 2021-11-23 | End: 2022-01-26 | Stop reason: SDUPTHER

## 2021-11-23 NOTE — TELEPHONE ENCOUNTER
We can send in more hydroxyzine as before.   We can also try BuSpar 15 mg every 8 hours as needed  Dispense #60 with 2 refills

## 2021-11-29 RX ORDER — ATORVASTATIN CALCIUM 40 MG/1
40 TABLET, FILM COATED ORAL DAILY
Qty: 30 TABLET | Refills: 3 | Status: SHIPPED | OUTPATIENT
Start: 2021-11-29 | End: 2022-03-28

## 2021-11-29 NOTE — TELEPHONE ENCOUNTER
Received fax from pharmacy requesting refill on pts medication(s). Pt was last seen in office on 11/1/2021  and has a follow up scheduled for 2/16/2022. Will send request to  Dr. Morgan Ruelas  for authorization.      Requested Prescriptions     Pending Prescriptions Disp Refills    atorvastatin (LIPITOR) 40 MG tablet [Pharmacy Med Name: Atorvastatin Calcium 40 MG Oral Tablet (LIPITOR)] 30 tablet 3     Sig: Take 1 tablet by mouth daily

## 2021-11-30 ENCOUNTER — PATIENT MESSAGE (OUTPATIENT)
Dept: PRIMARY CARE CLINIC | Age: 21
End: 2021-11-30

## 2021-11-30 DIAGNOSIS — M25.561 CHRONIC PAIN OF BOTH KNEES: ICD-10-CM

## 2021-11-30 DIAGNOSIS — G89.29 CHRONIC PAIN OF BOTH KNEES: ICD-10-CM

## 2021-11-30 DIAGNOSIS — M25.562 CHRONIC PAIN OF BOTH KNEES: ICD-10-CM

## 2021-11-30 DIAGNOSIS — S86.912A STRAIN OF LEFT KNEE, INITIAL ENCOUNTER: Primary | ICD-10-CM

## 2021-11-30 NOTE — TELEPHONE ENCOUNTER
Okay to put in referral for orthopedic [place] as requested.   We can also see her here if she desires

## 2021-11-30 NOTE — TELEPHONE ENCOUNTER
From: Lucy Plata  To: Dr. Early Ganser: 11/30/2021 1:57 AM CST  Subject: Question    Is there anyway i can get a referral to the orthopedic group? i am having a hard time on my knee and it keeps getting worse day by day i am limping on it at home and at work. i aggravate it by being up on it i am crying in the middle of the night with it hurting nothing is helping .  i am on [place] [employer] for the time being i am struggling with the pain

## 2021-12-28 ENCOUNTER — TELEPHONE (OUTPATIENT)
Dept: PRIMARY CARE CLINIC | Age: 21
End: 2021-12-28

## 2021-12-28 NOTE — TELEPHONE ENCOUNTER
Mom called, she has had a cough for a few days.  Mother says they are going to loose their electric tomorrow and wants to get her meds first.

## 2022-01-24 RX ORDER — LAMOTRIGINE 200 MG/1
200 TABLET ORAL 2 TIMES DAILY
Qty: 60 TABLET | Refills: 11 | Status: SHIPPED | OUTPATIENT
Start: 2022-01-24

## 2022-01-24 NOTE — TELEPHONE ENCOUNTER
Received call/My Chart Message from patient requesting refill on medication(s). Pt was last seen in office on 11/1/2021  and has a follow up scheduled for Visit date not found. Will send request to provider for authorization.      Requested Prescriptions     Pending Prescriptions Disp Refills    lamoTRIgine (LAMICTAL) 200 MG tablet 60 tablet 11     Sig: Take 1 tablet by mouth 2 times daily

## 2022-01-25 ENCOUNTER — PATIENT MESSAGE (OUTPATIENT)
Dept: PRIMARY CARE CLINIC | Age: 22
End: 2022-01-25

## 2022-01-25 DIAGNOSIS — F41.9 ANXIETY AND DEPRESSION: ICD-10-CM

## 2022-01-25 DIAGNOSIS — F32.A ANXIETY AND DEPRESSION: ICD-10-CM

## 2022-01-25 DIAGNOSIS — I10 PRIMARY HYPERTENSION: ICD-10-CM

## 2022-01-25 DIAGNOSIS — R00.2 PALPITATIONS: ICD-10-CM

## 2022-01-25 DIAGNOSIS — G47.09 OTHER INSOMNIA: ICD-10-CM

## 2022-01-25 DIAGNOSIS — R00.0 TACHYCARDIA: ICD-10-CM

## 2022-01-25 RX ORDER — VENLAFAXINE HYDROCHLORIDE 150 MG/1
150 CAPSULE, EXTENDED RELEASE ORAL DAILY
Qty: 90 CAPSULE | Refills: 2 | Status: SHIPPED | OUTPATIENT
Start: 2022-01-25 | End: 2022-09-13

## 2022-01-25 NOTE — TELEPHONE ENCOUNTER
Received fax from pharmacy requesting refill on pts medication(s). Pt was last seen in office on 11/1/2021  and has a follow up scheduled for 2/8/2022. Will send request to  Dr. Marisol Clancy  for authorization.      Requested Prescriptions     Pending Prescriptions Disp Refills    venlafaxine (EFFEXOR XR) 150 MG extended release capsule 90 capsule 2     Sig: Take 1 capsule by mouth daily

## 2022-01-26 DIAGNOSIS — M25.50 POLYARTHRALGIA: ICD-10-CM

## 2022-01-26 RX ORDER — METOPROLOL SUCCINATE 50 MG/1
50 TABLET, EXTENDED RELEASE ORAL DAILY
Qty: 30 TABLET | Refills: 11 | Status: SHIPPED | OUTPATIENT
Start: 2022-01-26

## 2022-01-26 RX ORDER — IBUPROFEN 800 MG/1
800 TABLET ORAL EVERY 8 HOURS PRN
Qty: 120 TABLET | Refills: 3 | Status: SHIPPED | OUTPATIENT
Start: 2022-01-26 | End: 2022-10-31

## 2022-01-26 RX ORDER — HYDROXYZINE HYDROCHLORIDE 25 MG/1
25 TABLET, FILM COATED ORAL EVERY 6 HOURS PRN
Qty: 90 TABLET | Refills: 5 | Status: SHIPPED | OUTPATIENT
Start: 2022-01-26 | End: 2022-04-27

## 2022-01-26 RX ORDER — NORGESTIMATE AND ETHINYL ESTRADIOL 0.25-0.035
1 KIT ORAL DAILY
Qty: 84 TABLET | Refills: 3 | Status: SHIPPED | OUTPATIENT
Start: 2022-01-26 | End: 2022-09-09

## 2022-01-26 RX ORDER — BUSPIRONE HYDROCHLORIDE 5 MG/1
5 TABLET ORAL 2 TIMES DAILY
Qty: 60 TABLET | Refills: 0 | Status: SHIPPED | OUTPATIENT
Start: 2022-01-26 | End: 2022-02-25

## 2022-01-26 NOTE — TELEPHONE ENCOUNTER
From: Shiva Minor  To: Dr. Jorge Luis Cooper: 1/25/2022 10:52 PM CST  Subject: Buspar    I am needing my Buspar filled and [facility]

## 2022-01-26 NOTE — TELEPHONE ENCOUNTER
Received fax from pharmacy requesting refill on pts medication(s). Pt was last seen in office on 11/1/2021  and has a follow up scheduled for 2/8/2022. Will send request to  Dr. Juvenal Brownlee  for authorization.      Requested Prescriptions     Pending Prescriptions Disp Refills    ibuprofen (ADVIL;MOTRIN) 800 MG tablet 120 tablet 3     Sig: Take 1 tablet by mouth every 8 hours as needed for Pain With food or milk

## 2022-01-26 NOTE — TELEPHONE ENCOUNTER
Received call/My Chart Message from patient requesting refill on medication(s). Pt was last seen in office on 11/1/2021  and has a follow up scheduled for 2/8/2022. Will send request to provider for authorization.      Requested Prescriptions     Pending Prescriptions Disp Refills    hydrOXYzine (ATARAX) 25 MG tablet 90 tablet 5     Sig: Take 1 tablet by mouth every 6 hours as needed for Anxiety

## 2022-01-26 NOTE — TELEPHONE ENCOUNTER
Received fax from pharmacy requesting refill on pts medication(s). Pt was last seen in office on 11/1/2021  and has a follow up scheduled for 1/25/2022. Will send request to  Dr. Emelia Dawn  for authorization.      Requested Prescriptions     Pending Prescriptions Disp Refills    metoprolol succinate (TOPROL XL) 50 MG extended release tablet 30 tablet 11     Sig: Take 1 tablet by mouth daily

## 2022-01-26 NOTE — TELEPHONE ENCOUNTER
Received fax from pharmacy requesting refill on pts medication(s). Pt was last seen in office on 11/1/2021  and has a follow up scheduled for 1/26/2022. Will send request to  Dr. Nacho Carroll  for authorization.      Requested Prescriptions     Pending Prescriptions Disp Refills    norgestimate-ethinyl estradiol (PREVIFEM) 0.25-35 MG-MCG per tablet 84 tablet 3     Sig: Take 1 tablet by mouth daily

## 2022-01-27 ENCOUNTER — TELEPHONE (OUTPATIENT)
Dept: PRIMARY CARE CLINIC | Age: 22
End: 2022-01-27

## 2022-01-27 NOTE — TELEPHONE ENCOUNTER
Cool liquids, popsicles, ice chips to decrease inflammation of the back of the throat. An antihistamine can decrease drainage.   Warm salt water gargles

## 2022-01-27 NOTE — TELEPHONE ENCOUNTER
Patient called in stating that any time she eats anything \"rough\" it feels like it is cutting the back of her throat, this is the only time it hurts. Pleas advise.

## 2022-02-19 DIAGNOSIS — G89.29 CHRONIC PAIN OF BOTH KNEES: Primary | ICD-10-CM

## 2022-02-19 DIAGNOSIS — M25.562 CHRONIC PAIN OF BOTH KNEES: Primary | ICD-10-CM

## 2022-02-19 DIAGNOSIS — M25.561 CHRONIC PAIN OF BOTH KNEES: Primary | ICD-10-CM

## 2022-02-21 ENCOUNTER — OFFICE VISIT (OUTPATIENT)
Dept: PRIMARY CARE CLINIC | Age: 22
End: 2022-02-21
Payer: MEDICAID

## 2022-02-21 VITALS
BODY MASS INDEX: 44.41 KG/M2 | DIASTOLIC BLOOD PRESSURE: 84 MMHG | SYSTOLIC BLOOD PRESSURE: 124 MMHG | HEIGHT: 68 IN | WEIGHT: 293 LBS | HEART RATE: 112 BPM | OXYGEN SATURATION: 98 % | TEMPERATURE: 97.1 F

## 2022-02-21 DIAGNOSIS — M17.12 PRIMARY OSTEOARTHRITIS OF LEFT KNEE: ICD-10-CM

## 2022-02-21 DIAGNOSIS — Z00.00 VISIT FOR PREVENTIVE HEALTH EXAMINATION: Primary | ICD-10-CM

## 2022-02-21 DIAGNOSIS — M25.562 CHRONIC PAIN OF LEFT KNEE: ICD-10-CM

## 2022-02-21 DIAGNOSIS — G89.29 CHRONIC PAIN OF LEFT KNEE: ICD-10-CM

## 2022-02-21 DIAGNOSIS — L98.9 SKIN LESION: ICD-10-CM

## 2022-02-21 PROCEDURE — 20610 DRAIN/INJ JOINT/BURSA W/O US: CPT | Performed by: PEDIATRICS

## 2022-02-21 PROCEDURE — 99395 PREV VISIT EST AGE 18-39: CPT | Performed by: PEDIATRICS

## 2022-02-21 PROCEDURE — 17110 DESTRUCTION B9 LES UP TO 14: CPT | Performed by: PEDIATRICS

## 2022-02-21 RX ORDER — METHYLPREDNISOLONE ACETATE 80 MG/ML
80 INJECTION, SUSPENSION INTRA-ARTICULAR; INTRALESIONAL; INTRAMUSCULAR; SOFT TISSUE ONCE
Status: COMPLETED | OUTPATIENT
Start: 2022-02-21 | End: 2022-02-21

## 2022-02-21 RX ORDER — TRIAMCINOLONE ACETONIDE 40 MG/ML
40 INJECTION, SUSPENSION INTRA-ARTICULAR; INTRAMUSCULAR ONCE
Status: COMPLETED | OUTPATIENT
Start: 2022-02-21 | End: 2022-02-21

## 2022-02-21 RX ADMIN — TRIAMCINOLONE ACETONIDE 40 MG: 40 INJECTION, SUSPENSION INTRA-ARTICULAR; INTRAMUSCULAR at 20:09

## 2022-02-21 RX ADMIN — METHYLPREDNISOLONE ACETATE 80 MG: 80 INJECTION, SUSPENSION INTRA-ARTICULAR; INTRALESIONAL; INTRAMUSCULAR; SOFT TISSUE at 20:08

## 2022-02-21 ASSESSMENT — ENCOUNTER SYMPTOMS
CHEST TIGHTNESS: 0
VOMITING: 0
SINUS PRESSURE: 0
EYE DISCHARGE: 0
SORE THROAT: 0
COUGH: 0
BACK PAIN: 0
SHORTNESS OF BREATH: 0
CONSTIPATION: 0
DIARRHEA: 0
WHEEZING: 0
ABDOMINAL PAIN: 0
NAUSEA: 0

## 2022-02-21 NOTE — TELEPHONE ENCOUNTER
Received fax from pharmacy requesting refill on pts medication(s). Pt was last seen in office on 11/1/2021  and has a follow up scheduled for 2/21/2022. Will send request to  Dr. Mary Hammer  for patient.      Requested Prescriptions     Pending Prescriptions Disp Refills    piroxicam (FELDENE) 20 MG capsule 90 capsule 3     Sig: Take 1 capsule by mouth daily

## 2022-02-21 NOTE — PROGRESS NOTES
PPD Placement note  Josiah Kumar, 22 y.o. female is here today for placement of PPD test  Reason for PPD test: job employment  Pt taken PPD test before: yes  Verified in allergy area and with patient that they are not allergic to the products PPD is made of (Phenol or Tween). Yes  Is patient taking any oral or IV steroid medication now or have they taken it in the last month? no  Has the patient ever received the BCG vaccine?: no  Has the patient been in recent contact with anyone known or suspected of having active TB disease?: no       Date of exposure (if applicable): NA       Name of person they were exposed to (if applicable): NA  Patient's Country of origin?: NA  O: Alert and oriented in NAD. P:  PPD placed on 2/21/2022. Patient advised to return for reading within 48-72 hours.

## 2022-02-21 NOTE — PROGRESS NOTES
[facility], [address]  Phone [phone]   Fax [phone]      OFFICE VISIT: 2022    Reba Orlando-: 2000      HPI  Reason For Visit:  Reba Guthrie is a 22 y.o. Forms (needing forms filled out, she is starting a job with  [employer]. [address]), Other (bump in center of chest, showed up a few years ago and would like froze off/ ), and Knee Pain (left knee pain, would like to discuss injection)      Patient presents on follow-up for multiple issues  Patient concerns:  She needs a form filled out for work. This is an employment physical.  She will also need PPD. She also has a skin lesion on her anterior chest wall has been present for the past couple of years. She is concerned about this and would like to get rid of it. She is requesting to have this frozen. It is catching on her clothes and is frequently irritated. She is complaining of left knee pain. This is been going on for quite some time. She does have a history of some arthritis in her left knee. She is requesting a knee injection at this point. X-ray was performed on 2021, and this was normal  She is taking piroxicam 20 mg on a daily basis. She needs a refill of this medication today  Weight is noted below         height is 5' 8\" (1.727 m) and weight is 309 lb (140.2 kg) (abnormal). Her temporal temperature is 97.1 °F (36.2 °C). Her blood pressure is 124/84 and her pulse is 112. Her oxygen saturation is 98%. Body mass index is 46.98 kg/m². I have reviewed the following with the Ms. Orlando   Lab Review  Orders Only on 2021   Component Date Value    T4 Free 2021 1.23     TSH 2021 1.880     Cholesterol, Total 2021 197     Triglycerides 2021 102     HDL 2021 49*    LDL Calculated 2021 128     WBC 2021 6.5     RBC 2021 4.32     Hemoglobin 2021 12.4     Hematocrit 2021 40.6  MCV 08/31/2021 94.0     MCH 08/31/2021 28.7     MCHC 08/31/2021 30.5*    RDW 08/31/2021 13.9     Platelets 01/22/6064 330     MPV 08/31/2021 9.9     Neutrophils % 08/31/2021 57.2     Lymphocytes % 08/31/2021 34.7     Monocytes % 08/31/2021 6.3     Eosinophils % 08/31/2021 1.2     Basophils % 08/31/2021 0.3     Neutrophils Absolute 08/31/2021 3.7     Immature Granulocytes # 08/31/2021 0.0     Lymphocytes Absolute 08/31/2021 2.3     Monocytes Absolute 08/31/2021 0.40     Eosinophils Absolute 08/31/2021 0.10     Basophils Absolute 08/31/2021 0.00     Sodium 08/31/2021 140     Potassium 08/31/2021 4.2     Chloride 08/31/2021 104     CO2 08/31/2021 23     Anion Gap 08/31/2021 13     Glucose 08/31/2021 95     BUN 08/31/2021 4*    CREATININE 08/31/2021 0.6     GFR Non- 08/31/2021 >60     GFR  08/31/2021 >59     Calcium 08/31/2021 9.5     Total Protein 08/31/2021 6.9     Albumin 08/31/2021 4.0     Total Bilirubin 08/31/2021 0.4     Alkaline Phosphatase 08/31/2021 28*    ALT 08/31/2021 12     AST 08/31/2021 15     Microalbumin, Random Uri* 09/02/2021 <1.20     Creatinine, Ur 09/02/2021 141.8     Microalbumin Creatinine * 09/02/2021 see below      Copies of these are in the chart.     Current Outpatient Medications   Medication Sig Dispense Refill    piroxicam (FELDENE) 20 MG capsule Take 1 capsule by mouth daily 30 capsule 11    metoprolol succinate (TOPROL XL) 50 MG extended release tablet Take 1 tablet by mouth daily 30 tablet 11    hydrOXYzine (ATARAX) 25 MG tablet Take 1 tablet by mouth every 6 hours as needed for Anxiety 90 tablet 5    busPIRone (BUSPAR) 5 MG tablet Take 1 tablet by mouth 2 times daily 60 tablet 0    norgestimate-ethinyl estradiol (PREVIFEM) 0.25-35 MG-MCG per tablet Take 1 tablet by mouth daily 84 tablet 3    ibuprofen (ADVIL;MOTRIN) 800 MG tablet Take 1 tablet by mouth every 8 hours as needed for Pain With food or milk 120 tablet 3    venlafaxine (EFFEXOR XR) 150 MG extended release capsule Take 1 capsule by mouth daily 90 capsule 2    lamoTRIgine (LAMICTAL) 200 MG tablet Take 1 tablet by mouth 2 times daily 60 tablet 11    atorvastatin (LIPITOR) 40 MG tablet Take 1 tablet by mouth daily 30 tablet 3    amLODIPine (NORVASC) 5 MG tablet Take 1 tablet by mouth daily 30 tablet 5    naproxen (NAPROSYN) 500 MG tablet Take 1 tablet by mouth 2 times daily (with meals) 60 tablet 0    ipratropium-albuterol (DUONEB) 0.5-2.5 (3) MG/3ML SOLN nebulizer solution Use 1 vial in nebulizer every 6 hours 360 mL 1    montelukast (SINGULAIR) 10 MG tablet Take 1 tablet by mouth daily 90 tablet 3    Multiple Vitamin (MULTI-DAY VITAMINS PO) Take 1 tablet by mouth daily.        Current Facility-Administered Medications   Medication Dose Route Frequency Provider Last Rate Last Admin    tuberculin injection 5 Units  5 Units IntraDERmal Once B Sury Chery, DO   5 Units at 02/21/22 8363       Allergies: Eggs or egg-derived products, Bactrim [sulfamethoxazole-trimethoprim], Elemental sulfur, Other, Pcn [penicillins], and Sulfa antibiotics     Past Medical History:   Diagnosis Date    Asthma     History of Chiari malformation     Seizures ([facility] [address]     VSD (ventricular septal defect)        Family History   Problem Relation Age of Onset    Asthma Mother     Lupus Mother     High Blood Pressure Mother     Asthma Maternal Grandmother     Asthma Maternal Grandfather     Cancer Maternal Grandfather     Stroke Maternal Grandfather     Diabetes Maternal Grandfather     Asthma Paternal Grandmother     Stroke Paternal Grandmother     Diabetes Paternal Grandmother     Asthma Paternal Grandfather        Past Surgical History:   Procedure Laterality Date    ADENOIDECTOMY      OTHER SURGICAL HISTORY      chiari decompression    TONSILLECTOMY      TYMPANOSTOMY TUBE PLACEMENT      VENTRICULOPERITONEAL SHUNT         Social History     Tobacco Use    Smoking status: Never Smoker    Smokeless tobacco: Never Used   Substance Use Topics    Alcohol use: No        Review of Systems   Constitutional: Negative for appetite change, chills, fatigue and fever. HENT: Negative for congestion, ear discharge, ear pain, postnasal drip, sinus pressure and sore throat. Eyes: Negative for discharge and visual disturbance. Respiratory: Negative for cough, chest tightness, shortness of breath and wheezing. Asthma is stable. Cardiovascular: Negative for chest pain, palpitations and leg swelling. Blood pressure consistently running high   Gastrointestinal: Negative for abdominal pain, constipation, diarrhea, nausea and vomiting. Genitourinary: Negative for difficulty urinating, dysuria and menstrual problem. Musculoskeletal: Positive for arthralgias (left knee ). Negative for back pain, joint swelling and myalgias. Skin: Negative for rash. Skin lesion on anterior chest wall  Not painful, not itchy, not changing. Neurological: Positive for headaches ( Mostly when blood pressure is up). Negative for dizziness and weakness. She fell but slipped on wet floor. Hematological: Negative for adenopathy. Does not bruise/bleed easily. Psychiatric/Behavioral: Negative for agitation (doing better), sleep disturbance and suicidal ideas. The patient is nervous/anxious (but tolerating). Some rare panic attacks       Physical Exam  Vitals reviewed. Constitutional:       General: She is not in acute distress. Appearance: She is well-developed. She is not toxic-appearing. Comments: Body habitus is ob   HENT:      Head: Normocephalic and atraumatic. Right Ear: Hearing, tympanic membrane, ear canal and external ear normal.      Left Ear: Hearing, tympanic membrane, ear canal and external ear normal.      Nose: Nose normal.      Mouth/Throat:      Mouth: Mucous membranes are moist.      Pharynx: Oropharynx is clear.  No posterior oropharyngeal erythema. Eyes:      General: Lids are normal.      Extraocular Movements: Extraocular movements intact. Conjunctiva/sclera: Conjunctivae normal.      Pupils: Pupils are equal, round, and reactive to light. Neck:      Thyroid: No thyromegaly. Vascular: No carotid bruit or JVD. Trachea: Phonation normal.   Cardiovascular:      Rate and Rhythm: Normal rate and regular rhythm. No extrasystoles are present. Chest Wall: PMI is not displaced. Heart sounds: Normal heart sounds. No murmur heard. No friction rub. No gallop. Pulmonary:      Effort: Pulmonary effort is normal. No respiratory distress. Breath sounds: No wheezing, rhonchi or rales. Abdominal:      General: Bowel sounds are normal. There is no distension. Palpations: Abdomen is soft. There is no mass. Tenderness: There is no abdominal tenderness. Genitourinary:     Comments: Examination deferred  Musculoskeletal:         General: Swelling (mild of left knee) present. Normal range of motion. Cervical back: Neck supple. Comments: Joint examination reveals no acute arthritis or synovitis. No ligamentous abnormality by evaluation. There is some crepitus in L knee. Lymphadenopathy:      Cervical: No cervical adenopathy. Skin:     General: Skin is warm and dry. Findings: No rash. Neurological:      Mental Status: She is alert and oriented to person, place, and time. Cranial Nerves: No cranial nerve deficit (by gross examination). Sensory: No sensory deficit. Motor: No tremor or atrophy. Gait: Gait normal.      Comments: No focal deficits appreciated   Psychiatric:         Speech: Speech normal.         Behavior: Behavior normal. Behavior is cooperative. ASSESSMENT      ICD-10-CM    1. Visit for preventive health examination  Z00.00    2.  Chronic pain of left knee  M25.562 triamcinolone acetonide (KENALOG-40) injection 40 mg    G89.29 methylPREDNISolone acetate (DEPO-MEDROL) injection 80 mg     RI DRAIN/INJECT LARGE JOINT/BURSA   3. Skin lesion  L98.9    4. Primary osteoarthritis of left knee  M17.12 triamcinolone acetonide (KENALOG-40) injection 40 mg     methylPREDNISolone acetate (DEPO-MEDROL) injection 80 mg     RI DRAIN/INJECT LARGE JOINT/BURSA         PLAN    1. Visit for preventive health examination  Routine age-appropriate anticipatory guidance was provided. Annual wellness visit was performed and issues were addressed as identified. 2. Chronic pain of left knee/ osteoarthritis. We will inject her knee today. She has had her knee injected in the past.    3. Skin lesion  This is what appears to be a inflamed dermatofibroma. We will freeze this today      Orders Placed This Encounter   Procedures    RI DRAIN/INJECT LARGE JOINT/BURSA        Return in about 3 months (around 5/21/2022) for 30. Procedure:  Cryo-therapy destruction of Irritated dermatofibroma  Indications:  Local irritation and pain. Consent:  Verbal consent obtained as standard  Procedure: The lesion was frozen by administration of aerosolized liquid nitrogen to accomplish a complete freeze of the lesion down to the base of the skin and slightly onto the skin circumferentially around the lesion area  The lesion was then allowed to thaw and then refrozen ×2 consecutively with similar freezing patterns. Estimated blood loss: None  Complications:  None  Postop instructions: You may experience pain this evening that can be resolved with cool compresses. Anticipate a hemorrhagic vesicle erupted at the site of freezing. Try to allow wart to fall off on its own. Keep the base of the wound clean and dry following avulsion     In the lesion returns, please come back to be refrozen prior to growing back. .          Knee Arthrocentesis with Injection Procedure Note    Pre-operative Diagnosis: left knee djd with pain    Post-operative Diagnosis: same    Indications: Symptom relief from osteoarthritis    Anesthesia: not required     Procedure Details     Verbal consent was obtained for the procedure. The joint was prepped with chlorhexadine. The area was sprayed with Gebauer's Ethyl Chloride as a topical anesthetic and then a 22 gauge needle was inserted into the superior aspect of the joint from a lateral approach. 2 ml 1% lidocaine and 2 ml of DepoMedrol (40mg/ml) and 1ml Kenalog (40mg/ml)  was then injected into the joint. The needle was removed and the area cleansed and dressed. Complications:  None; patient tolerated the procedure well.

## 2022-02-23 NOTE — PROGRESS NOTES
PPD Reading Note  [facility] and [email or web address]. Result: 0 mm induration.   Interpretation: Negative  Allergic reaction: no

## 2022-02-25 RX ORDER — BUSPIRONE HYDROCHLORIDE 5 MG/1
5 TABLET ORAL 2 TIMES DAILY
Qty: 180 TABLET | Refills: 3 | Status: SHIPPED | OUTPATIENT
Start: 2022-02-25

## 2022-03-25 ENCOUNTER — PATIENT MESSAGE (OUTPATIENT)
Dept: PRIMARY CARE CLINIC | Age: 22
End: 2022-03-25

## 2022-03-25 DIAGNOSIS — R05.9 COUGH: Primary | ICD-10-CM

## 2022-03-25 RX ORDER — BENZONATATE 200 MG/1
200 CAPSULE ORAL 3 TIMES DAILY PRN
Qty: 30 CAPSULE | Refills: 0 | Status: SHIPPED | OUTPATIENT
Start: 2022-03-25 | End: 2022-04-01

## 2022-03-28 DIAGNOSIS — J45.20 MILD INTERMITTENT ASTHMA WITHOUT COMPLICATION: ICD-10-CM

## 2022-03-28 RX ORDER — ATORVASTATIN CALCIUM 40 MG/1
TABLET, FILM COATED ORAL
Qty: 90 TABLET | Refills: 3 | Status: SHIPPED | OUTPATIENT
Start: 2022-03-28

## 2022-03-28 RX ORDER — MONTELUKAST SODIUM 10 MG/1
TABLET ORAL
Qty: 90 TABLET | Refills: 3 | Status: SHIPPED | OUTPATIENT
Start: 2022-03-28

## 2022-03-28 NOTE — TELEPHONE ENCOUNTER
Requested Prescriptions     Pending Prescriptions Disp Refills    montelukast (SINGULAIR) 10 MG tablet [Pharmacy Med Name: montelukast 10 mg tablet] 90 tablet 3     Sig: TAKE ONE TABLET BY MOUTH DAILY    atorvastatin (LIPITOR) 40 MG tablet [Pharmacy Med Name: atorvastatin 40 mg tablet] 90 tablet 3     Sig: TAKE ONE TABLET BY MOUTH DAILY

## 2022-03-29 ENCOUNTER — TELEMEDICINE (OUTPATIENT)
Dept: PRIMARY CARE CLINIC | Age: 22
End: 2022-03-29
Payer: MEDICAID

## 2022-03-29 DIAGNOSIS — J40 BRONCHITIS: ICD-10-CM

## 2022-03-29 DIAGNOSIS — J45.20 MILD INTERMITTENT ASTHMA WITHOUT COMPLICATION: ICD-10-CM

## 2022-03-29 DIAGNOSIS — J01.00 ACUTE MAXILLARY SINUSITIS, RECURRENCE NOT SPECIFIED: Primary | ICD-10-CM

## 2022-03-29 PROCEDURE — 99213 OFFICE O/P EST LOW 20 MIN: CPT | Performed by: NURSE PRACTITIONER

## 2022-03-29 RX ORDER — DOXYCYCLINE HYCLATE 100 MG
100 TABLET ORAL 2 TIMES DAILY
Qty: 20 TABLET | Refills: 0 | Status: SHIPPED | OUTPATIENT
Start: 2022-03-29 | End: 2022-04-08

## 2022-03-29 RX ORDER — METHYLPREDNISOLONE 4 MG/1
TABLET ORAL
Qty: 1 KIT | Refills: 0 | Status: SHIPPED | OUTPATIENT
Start: 2022-03-29 | End: 2022-06-22 | Stop reason: ALTCHOICE

## 2022-03-29 RX ORDER — GUAIFENESIN 600 MG/1
1200 TABLET, EXTENDED RELEASE ORAL 2 TIMES DAILY
Qty: 40 TABLET | Refills: 0 | Status: SHIPPED | OUTPATIENT
Start: 2022-03-29 | End: 2022-04-08

## 2022-03-29 ASSESSMENT — ENCOUNTER SYMPTOMS
ABDOMINAL PAIN: 0
WHEEZING: 0
CONSTIPATION: 0
TROUBLE SWALLOWING: 0
BLOOD IN STOOL: 0
SINUS PRESSURE: 1
EYE REDNESS: 0
DIARRHEA: 0
RHINORRHEA: 1
COUGH: 1
SORE THROAT: 0
EYE DISCHARGE: 0

## 2022-03-29 NOTE — PROGRESS NOTES
Arun Perez (:  2000) is a Established patient, here for evaluation of the following:    Assessment & Plan   Below is the assessment and plan developed based on review of pertinent history, physical exam, labs, studies, and medications. 1. Acute maxillary sinusitis, recurrence not specified  -     doxycycline hyclate (VIBRA-TABS) 100 MG tablet; Take 1 tablet by mouth 2 times daily for 10 days, Disp-20 tablet, R-0Normal  2. Mild intermittent asthma without complication  -     methylPREDNISolone (MEDROL, MIRA,) 4 MG tablet; Take po as directed, Disp-1 kit, R-0Normal  3. Bronchitis  -     guaiFENesin (MUCINEX) 600 MG extended release tablet; Take 2 tablets by mouth 2 times daily for 10 days, Disp-40 tablet, R-0Normal    Return if symptoms worsen or fail to improve. Subjective   Sinusitis  This is a new problem. The current episode started 1 to 4 weeks ago. The problem has been gradually worsening since onset. There has been no fever. Associated symptoms include congestion, coughing and sinus pressure. Pertinent negatives include no chills or sore throat. Review of Systems   Constitutional: Negative for appetite change, chills, fever and unexpected weight change. HENT: Positive for congestion, postnasal drip, rhinorrhea and sinus pressure. Negative for sore throat and trouble swallowing. Eyes: Negative for discharge and redness. Respiratory: Positive for cough. Negative for wheezing. Cardiovascular: Negative for chest pain. Gastrointestinal: Negative for abdominal pain, blood in stool, constipation and diarrhea. Genitourinary: Negative for dysuria. Skin: Negative for rash. Neurological: Negative for weakness. Hematological: Negative for adenopathy.           Objective   Patient-Reported Vitals  No data recorded     Physical Exam  [INSTRUCTIONS:  \"[x]\" Indicates a positive item  \"[]\" Indicates a negative item  -- DELETE ALL ITEMS NOT EXAMINED]    Constitutional: [x] Appears well-developed and well-nourished [x] No apparent distress      [] Abnormal -     Mental status: [x] Alert and awake  [x] Oriented to person/place/time [x] Able to follow commands    [] Abnormal -     Eyes:   EOM    [x]  Normal    [] Abnormal -   Sclera  [x]  Normal    [] Abnormal -          Discharge [x]  None visible   [] Abnormal -     HENT: [x] Normocephalic, atraumatic  [] Abnormal -   [x] Mouth/Throat: Mucous membranes are moist    External Ears [x] Normal  [] Abnormal -    Neck: [x] No visualized mass [] Abnormal -     Pulmonary/Chest: [x] Respiratory effort normal   [x] No visualized signs of difficulty breathing or respiratory distress        [] Abnormal - tight cough   Musculoskeletal:   [x] Normal gait with no signs of ataxia         [x] Normal range of motion of neck        [] Abnormal -     Neurological:        [x] No Facial Asymmetry (Cranial nerve 7 motor function) (limited exam due to video visit)          [x] No gaze palsy        [] Abnormal -          Skin:        [x] No significant exanthematous lesions or discoloration noted on facial skin         [] Abnormal -            Psychiatric:       [x] Normal Affect [] Abnormal -        [x] No Hallucinations    Other pertinent observable physical exam findings:-                 Wilton Sherwood, was evaluated through a synchronous (real-time) audio-video encounter. The patient (or guardian if applicable) is aware that this is a billable service, which includes applicable co-pays. This Virtual Visit was conducted with patient's (and/or legal guardian's) consent. The visit was conducted pursuant to the emergency declaration under the [address] [place] [address] authority and the [employer] General Act. Patient identification was verified, and a caregiver was present when appropriate. The patient was located at home in a state where the provider was licensed to provide care. --Ann Jimenez, APRN

## 2022-04-04 ENCOUNTER — PATIENT MESSAGE (OUTPATIENT)
Dept: PRIMARY CARE CLINIC | Age: 22
End: 2022-04-04

## 2022-04-04 NOTE — TELEPHONE ENCOUNTER
From: Jean Hess  To: Dr. Hernandez Madan: 4/4/2022 2:01 PM CDT  Subject: Cough medicine/antibiotic     Is there anyway doc can send me a different antibiotic in or some different cough medicine

## 2022-04-05 RX ORDER — CEFDINIR 300 MG/1
300 CAPSULE ORAL 2 TIMES DAILY
Qty: 20 CAPSULE | Refills: 0 | Status: SHIPPED | OUTPATIENT
Start: 2022-04-05 | End: 2022-04-15

## 2022-04-14 ENCOUNTER — TELEPHONE (OUTPATIENT)
Dept: PSYCHIATRY | Age: 22
End: 2022-04-14

## 2022-04-14 DIAGNOSIS — F32.A ANXIETY AND DEPRESSION: ICD-10-CM

## 2022-04-14 DIAGNOSIS — F32.A ANXIETY AND DEPRESSION: Primary | ICD-10-CM

## 2022-04-14 DIAGNOSIS — R56.9 SEIZURES (HCC): ICD-10-CM

## 2022-04-14 DIAGNOSIS — R53.83 FATIGUE, UNSPECIFIED TYPE: ICD-10-CM

## 2022-04-14 DIAGNOSIS — F41.9 ANXIETY AND DEPRESSION: Primary | ICD-10-CM

## 2022-04-14 DIAGNOSIS — Z13.220 SCREENING FOR LIPID DISORDERS: ICD-10-CM

## 2022-04-14 DIAGNOSIS — J45.20 MILD INTERMITTENT ASTHMA WITHOUT COMPLICATION: ICD-10-CM

## 2022-04-14 DIAGNOSIS — F41.9 ANXIETY AND DEPRESSION: ICD-10-CM

## 2022-04-14 LAB
ALBUMIN SERPL-MCNC: 4.4 G/DL (ref 3.5–5.2)
ALP BLD-CCNC: 22 U/L (ref 35–104)
ALT SERPL-CCNC: 14 U/L (ref 5–33)
ANION GAP SERPL CALCULATED.3IONS-SCNC: 18 MMOL/L (ref 7–19)
AST SERPL-CCNC: 16 U/L (ref 5–32)
BASOPHILS ABSOLUTE: 0 K/UL (ref 0–0.2)
BASOPHILS RELATIVE PERCENT: 0.4 % (ref 0–1)
BILIRUB SERPL-MCNC: 0.6 MG/DL (ref 0.2–1.2)
BUN BLDV-MCNC: 7 MG/DL (ref 6–20)
CALCIUM SERPL-MCNC: 9.9 MG/DL (ref 8.6–10)
CHLORIDE BLD-SCNC: 105 MMOL/L (ref 98–111)
CHOLESTEROL, TOTAL: 237 MG/DL (ref 160–199)
CO2: 20 MMOL/L (ref 22–29)
CREAT SERPL-MCNC: 0.7 MG/DL (ref 0.5–0.9)
CREATININE URINE: 500.1 MG/DL (ref 4.2–622)
EOSINOPHILS ABSOLUTE: 0.1 K/UL (ref 0–0.6)
EOSINOPHILS RELATIVE PERCENT: 0.9 % (ref 0–5)
GFR AFRICAN AMERICAN: >59
GFR NON-AFRICAN AMERICAN: >60
GLUCOSE BLD-MCNC: 90 MG/DL (ref 74–109)
HCT VFR BLD CALC: 42.7 % (ref 37–47)
HDLC SERPL-MCNC: 61 MG/DL (ref 65–121)
HEMOGLOBIN: 13.5 G/DL (ref 12–16)
IMMATURE GRANULOCYTES #: 0 K/UL
LDL CHOLESTEROL CALCULATED: 158 MG/DL
LYMPHOCYTES ABSOLUTE: 2.2 K/UL (ref 1.1–4.5)
LYMPHOCYTES RELATIVE PERCENT: 33 % (ref 20–40)
MCH RBC QN AUTO: 30.5 PG (ref 27–31)
MCHC RBC AUTO-ENTMCNC: 31.6 G/DL (ref 33–37)
MCV RBC AUTO: 96.4 FL (ref 81–99)
MICROALBUMIN UR-MCNC: 4.5 MG/DL (ref 0–19)
MICROALBUMIN/CREAT UR-RTO: 9 MG/G
MONOCYTES ABSOLUTE: 0.4 K/UL (ref 0–0.9)
MONOCYTES RELATIVE PERCENT: 5.2 % (ref 0–10)
NEUTROPHILS ABSOLUTE: 4 K/UL (ref 1.5–7.5)
NEUTROPHILS RELATIVE PERCENT: 60.2 % (ref 50–65)
PDW BLD-RTO: 14 % (ref 11.5–14.5)
PLATELET # BLD: 358 K/UL (ref 130–400)
PMV BLD AUTO: 9.6 FL (ref 9.4–12.3)
POTASSIUM SERPL-SCNC: 4.3 MMOL/L (ref 3.5–5)
RBC # BLD: 4.43 M/UL (ref 4.2–5.4)
SODIUM BLD-SCNC: 143 MMOL/L (ref 136–145)
T4 FREE: 1.27 NG/DL (ref 0.93–1.7)
TOTAL PROTEIN: 6.7 G/DL (ref 6.6–8.7)
TRIGL SERPL-MCNC: 88 MG/DL (ref 0–149)
TSH SERPL DL<=0.05 MIU/L-ACNC: 1 UIU/ML (ref 0.27–4.2)
WBC # BLD: 6.7 K/UL (ref 4.8–10.8)

## 2022-04-14 NOTE — TELEPHONE ENCOUNTER
Left voicemail for return call to schedule first appointment with Melissa Oseguera. Advised to call [phone].

## 2022-04-14 NOTE — PROGRESS NOTES
[facility], [address]  Phone [phone]   Fax [phone]      OFFICE VISIT: 2022    Sylvia Orlando-: 2000      HPI  Reason For Visit:  Sylvia Varma is a 22 y.o. No chief complaint on file. vitals were not taken for this visit. There is no height or weight on file to calculate BMI. I have reviewed the following with the Ms. Orlando   Lab Review  No visits with results within 6 Month(s) from this visit.    Latest known visit with results is:   Orders Only on 2021   Component Date Value    T4 Free 2021 1.23     TSH 2021 1.880     Cholesterol, Total 2021 197     Triglycerides 2021 102     HDL 2021 49*    LDL Calculated 2021 128     WBC 2021 6.5     RBC 2021 4.32     Hemoglobin 2021 12.4     Hematocrit 2021 40.6     MCV 2021 94.0     MCH 2021 28.7     MCHC 2021 30.5*    RDW 2021 13.9     Platelets  330     MPV 2021 9.9     Neutrophils % 2021 57.2     Lymphocytes % 2021 34.7     Monocytes % 2021 6.3     Eosinophils % 2021 1.2     Basophils % 2021 0.3     Neutrophils Absolute 2021 3.7     Immature Granulocytes # 2021 0.0     Lymphocytes Absolute 2021 2.3     Monocytes Absolute 2021 0.40     Eosinophils Absolute 2021 0.10     Basophils Absolute 2021 0.00     Sodium 2021 140     Potassium 2021 4.2     Chloride 2021 104     CO2 2021 23     Anion Gap 2021 13     Glucose 2021 95     BUN 2021 4*    CREATININE 2021 0.6     GFR Non- 2021 >60     GFR  2021 >59     Calcium 2021 9.5     Total Protein 2021 6.9     Albumin 2021 4.0     Total Bilirubin 2021 0.4     Alkaline Phosphatase 2021 28*    ALT 2021 12     AST 08/31/2021 15     Microalbumin, Random Uri* 09/02/2021 <1.20     Creatinine, Ur 09/02/2021 141.8     Microalbumin Creatinine * 09/02/2021 see below      Copies of these are in the chart. Current Outpatient Medications   Medication Sig Dispense Refill    cefdinir (OMNICEF) 300 MG capsule Take 1 capsule by mouth 2 times daily for 10 days 20 capsule 0    methylPREDNISolone (MEDROL, MIRA,) 4 MG tablet Take po as directed 1 kit 0    montelukast (SINGULAIR) 10 MG tablet TAKE ONE TABLET BY MOUTH DAILY 90 tablet 3    atorvastatin (LIPITOR) 40 MG tablet TAKE ONE TABLET BY MOUTH DAILY 90 tablet 3    busPIRone (BUSPAR) 5 MG tablet Take 1 tablet by mouth 2 times daily 180 tablet 3    piroxicam (FELDENE) 20 MG capsule Take 1 capsule by mouth daily 30 capsule 11    metoprolol succinate (TOPROL XL) 50 MG extended release tablet Take 1 tablet by mouth daily 30 tablet 11    hydrOXYzine (ATARAX) 25 MG tablet Take 1 tablet by mouth every 6 hours as needed for Anxiety 90 tablet 5    norgestimate-ethinyl estradiol (PREVIFEM) 0.25-35 MG-MCG per tablet Take 1 tablet by mouth daily 84 tablet 3    ibuprofen (ADVIL;MOTRIN) 800 MG tablet Take 1 tablet by mouth every 8 hours as needed for Pain With food or milk 120 tablet 3    venlafaxine (EFFEXOR XR) 150 MG extended release capsule Take 1 capsule by mouth daily 90 capsule 2    lamoTRIgine (LAMICTAL) 200 MG tablet Take 1 tablet by mouth 2 times daily 60 tablet 11    amLODIPine (NORVASC) 5 MG tablet Take 1 tablet by mouth daily 30 tablet 5    naproxen (NAPROSYN) 500 MG tablet Take 1 tablet by mouth 2 times daily (with meals) 60 tablet 0    ipratropium-albuterol (DUONEB) 0.5-2.5 (3) MG/3ML SOLN nebulizer solution Use 1 vial in nebulizer every 6 hours 360 mL 1    Multiple Vitamin (MULTI-DAY VITAMINS PO) Take 1 tablet by mouth daily. No current facility-administered medications for this visit.        Allergies: Eggs or egg-derived products, Bactrim [sulfamethoxazole-trimethoprim], Elemental sulfur, Other, Pcn [penicillins], and Sulfa antibiotics     Past Medical History:   Diagnosis Date    Asthma     History of Chiari malformation     Seizures (HCC)     VSD (ventricular septal defect)        Family History   Problem Relation Age of Onset    Asthma Mother     Lupus Mother     High Blood Pressure Mother     Asthma Maternal Grandmother     Asthma Maternal Grandfather     Cancer Maternal Grandfather     Stroke Maternal Grandfather     Diabetes Maternal Grandfather     Asthma Paternal Grandmother     Stroke Paternal Grandmother     Diabetes Paternal Grandmother     Asthma Paternal Grandfather        Past Surgical History:   Procedure Laterality Date    ADENOIDECTOMY      OTHER SURGICAL HISTORY      chiari decompression    TONSILLECTOMY      TYMPANOSTOMY TUBE PLACEMENT      VENTRICULOPERITONEAL SHUNT         Social History     Tobacco Use    Smoking status: Never Smoker    Smokeless tobacco: Never Used   Substance Use Topics    Alcohol use: No        Review of Systems    Physical Exam        ASSESSMENT    No diagnosis found. PLAN    There are no diagnoses linked to this encounter. No orders of the defined types were placed in this encounter. No follow-ups on file.

## 2022-04-19 ENCOUNTER — PATIENT MESSAGE (OUTPATIENT)
Dept: PRIMARY CARE CLINIC | Age: 22
End: 2022-04-19

## 2022-04-19 NOTE — TELEPHONE ENCOUNTER
From: Molly Eason  To: Dr. Lynn Leonardo: 4/19/2022 4:58 PM CDT  Subject: Prilosec    Is there anyway doc can send in some prilosec I am having very bad heartburn and It's the worst
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
## 2022-04-20 DIAGNOSIS — M79.672 LEFT FOOT PAIN: ICD-10-CM

## 2022-04-20 DIAGNOSIS — M25.562 ACUTE PAIN OF LEFT KNEE: ICD-10-CM

## 2022-04-20 DIAGNOSIS — M25.572 ACUTE LEFT ANKLE PAIN: ICD-10-CM

## 2022-04-20 RX ORDER — OMEPRAZOLE 40 MG/1
40 CAPSULE, DELAYED RELEASE ORAL
Qty: 60 CAPSULE | Refills: 5 | Status: SHIPPED | OUTPATIENT
Start: 2022-04-20

## 2022-04-20 RX ORDER — NAPROXEN 500 MG/1
500 TABLET ORAL 2 TIMES DAILY WITH MEALS
Qty: 60 TABLET | Refills: 3 | Status: SHIPPED | OUTPATIENT
Start: 2022-04-20 | End: 2022-06-22

## 2022-04-20 NOTE — TELEPHONE ENCOUNTER
Requested Prescriptions     Pending Prescriptions Disp Refills    naproxen (NAPROSYN) 500 MG tablet 60 tablet 0     Sig: Take 1 tablet by mouth 2 times daily (with meals)

## 2022-04-27 DIAGNOSIS — G47.09 OTHER INSOMNIA: ICD-10-CM

## 2022-04-27 RX ORDER — HYDROXYZINE HYDROCHLORIDE 25 MG/1
TABLET, FILM COATED ORAL
Qty: 90 TABLET | Refills: 5 | Status: SHIPPED | OUTPATIENT
Start: 2022-04-27 | End: 2022-08-16

## 2022-05-10 ENCOUNTER — TELEPHONE (OUTPATIENT)
Dept: PSYCHIATRY | Age: 22
End: 2022-05-10

## 2022-05-10 NOTE — TELEPHONE ENCOUNTER
Pt is on [facility]'s wait list. Left [facility] offering an appointment tomorrow, 5/11.

## 2022-05-17 RX ORDER — AMLODIPINE BESYLATE 5 MG/1
TABLET ORAL
Qty: 180 TABLET | Refills: 0 | Status: SHIPPED | OUTPATIENT
Start: 2022-05-17 | End: 2022-10-25

## 2022-06-22 ENCOUNTER — TELEMEDICINE (OUTPATIENT)
Dept: PRIMARY CARE CLINIC | Age: 22
End: 2022-06-22
Payer: MEDICAID

## 2022-06-22 DIAGNOSIS — M25.572 ACUTE LEFT ANKLE PAIN: ICD-10-CM
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
## 2022-06-22 DIAGNOSIS — M79.672 LEFT FOOT PAIN: ICD-10-CM

## 2022-06-22 DIAGNOSIS — F51.01 PRIMARY INSOMNIA: Primary | ICD-10-CM

## 2022-06-22 DIAGNOSIS — M25.562 ACUTE PAIN OF LEFT KNEE: ICD-10-CM

## 2022-06-22 DIAGNOSIS — F41.9 ANXIETY AND DEPRESSION: ICD-10-CM

## 2022-06-22 DIAGNOSIS — F32.A ANXIETY AND DEPRESSION: ICD-10-CM

## 2022-06-22 DIAGNOSIS — Z91.09 ENVIRONMENTAL ALLERGIES: ICD-10-CM

## 2022-06-22 PROCEDURE — 99214 OFFICE O/P EST MOD 30 MIN: CPT | Performed by: PEDIATRICS

## 2022-06-22 RX ORDER — AMITRIPTYLINE HYDROCHLORIDE 25 MG/1
25 TABLET, FILM COATED ORAL NIGHTLY
Qty: 30 TABLET | Refills: 0 | Status: SHIPPED | OUTPATIENT
Start: 2022-06-22 | End: 2022-07-12

## 2022-06-22 RX ORDER — NAPROXEN 500 MG/1
TABLET ORAL
Qty: 60 TABLET | Refills: 3 | Status: SHIPPED | OUTPATIENT
Start: 2022-06-22 | End: 2022-10-12

## 2022-06-22 RX ORDER — VENLAFAXINE HYDROCHLORIDE 75 MG/1
75 CAPSULE, EXTENDED RELEASE ORAL DAILY
Qty: 30 CAPSULE | Refills: 3 | Status: SHIPPED | OUTPATIENT
Start: 2022-06-22 | End: 2022-09-13

## 2022-06-22 RX ORDER — FLUTICASONE PROPIONATE 50 MCG
2 SPRAY, SUSPENSION (ML) NASAL DAILY
Qty: 48 G | Refills: 1 | Status: SHIPPED | OUTPATIENT
Start: 2022-06-22 | End: 2022-09-26

## 2022-06-22 ASSESSMENT — ENCOUNTER SYMPTOMS
CONSTIPATION: 0
DIARRHEA: 0
SINUS PRESSURE: 0
SORE THROAT: 0
EYE DISCHARGE: 0
CHEST TIGHTNESS: 0
ABDOMINAL PAIN: 0
VOMITING: 0
BACK PAIN: 0
SHORTNESS OF BREATH: 0
NAUSEA: 0
COUGH: 0
WHEEZING: 0

## 2022-07-05 ENCOUNTER — PATIENT MESSAGE (OUTPATIENT)
Dept: PRIMARY CARE CLINIC | Age: 22
End: 2022-07-05

## 2022-07-05 NOTE — TELEPHONE ENCOUNTER
From: Tina Sheikh  To: Dr. Yoselin Turner: 7/5/2022 3:04 PM CDT  Subject: Question     I have been snotty and stuffed up these past few days and I have also been keeping a headache behind my eyes as well

## 2022-07-12 ENCOUNTER — TELEPHONE (OUTPATIENT)
Dept: PSYCHIATRY | Age: 22
End: 2022-07-12

## 2022-07-12 DIAGNOSIS — F51.01 PRIMARY INSOMNIA: ICD-10-CM

## 2022-07-12 RX ORDER — AMITRIPTYLINE HYDROCHLORIDE 25 MG/1
TABLET, FILM COATED ORAL
Qty: 30 TABLET | Refills: 5 | Status: SHIPPED | OUTPATIENT
Start: 2022-07-12

## 2022-07-12 NOTE — TELEPHONE ENCOUNTER
Spoke with patient regarding her upcoming appointment schedule with Maycol Chapman and discussed other options. Patient states she sees Dr. Brigitte Zavaleta in a few weeks and will discuss it with him then.

## 2022-07-12 NOTE — TELEPHONE ENCOUNTER
Received fax from pharmacy requesting refill on pts medication(s). Pt was last seen in office on 6/22/2022  and has a follow up scheduled for 8/8/2022. Will send request to  Dr. Vasu Arango  for patient.      Requested Prescriptions     Pending Prescriptions Disp Refills    amitriptyline (ELAVIL) 25 MG tablet [Pharmacy Med Name: amitriptyline 25 mg tablet] 30 tablet 5     Sig: TAKE ONE TABLET BY MOUTH AT BEDTIME

## 2022-07-25 ENCOUNTER — TELEPHONE (OUTPATIENT)
Dept: PRIMARY CARE CLINIC | Age: 22
End: 2022-07-25

## 2022-07-25 DIAGNOSIS — E78.2 MIXED HYPERLIPIDEMIA: ICD-10-CM

## 2022-07-25 DIAGNOSIS — E66.01 CLASS 3 SEVERE OBESITY DUE TO EXCESS CALORIES WITH BODY MASS INDEX (BMI) OF 45.0 TO 49.9 IN ADULT, UNSPECIFIED WHETHER SERIOUS COMORBIDITY PRESENT (HCC): ICD-10-CM

## 2022-07-25 DIAGNOSIS — R53.83 FATIGUE, UNSPECIFIED TYPE: ICD-10-CM

## 2022-07-25 DIAGNOSIS — R00.0 TACHYCARDIA: ICD-10-CM

## 2022-07-25 DIAGNOSIS — F32.A ANXIETY AND DEPRESSION: Primary | ICD-10-CM

## 2022-07-25 DIAGNOSIS — M25.50 POLYARTHRALGIA: ICD-10-CM

## 2022-07-25 DIAGNOSIS — M17.12 PRIMARY OSTEOARTHRITIS OF LEFT KNEE: ICD-10-CM

## 2022-07-25 DIAGNOSIS — I10 PRIMARY HYPERTENSION: ICD-10-CM

## 2022-07-25 DIAGNOSIS — F41.9 ANXIETY AND DEPRESSION: Primary | ICD-10-CM

## 2022-07-25 NOTE — TELEPHONE ENCOUNTER
We can check routine labs plus ROSE MARY plus double-stranded DNA plus sed rate and C-reactive protein

## 2022-07-27 DIAGNOSIS — F32.A ANXIETY AND DEPRESSION: ICD-10-CM

## 2022-07-27 DIAGNOSIS — I10 PRIMARY HYPERTENSION: ICD-10-CM

## 2022-07-27 DIAGNOSIS — M17.12 PRIMARY OSTEOARTHRITIS OF LEFT KNEE: ICD-10-CM

## 2022-07-27 DIAGNOSIS — M25.50 POLYARTHRALGIA: ICD-10-CM

## 2022-07-27 DIAGNOSIS — R53.83 FATIGUE, UNSPECIFIED TYPE: ICD-10-CM

## 2022-07-27 DIAGNOSIS — F41.9 ANXIETY AND DEPRESSION: ICD-10-CM

## 2022-07-27 DIAGNOSIS — E78.2 MIXED HYPERLIPIDEMIA: ICD-10-CM

## 2022-07-27 DIAGNOSIS — E66.01 CLASS 3 SEVERE OBESITY DUE TO EXCESS CALORIES WITH BODY MASS INDEX (BMI) OF 45.0 TO 49.9 IN ADULT, UNSPECIFIED WHETHER SERIOUS COMORBIDITY PRESENT (HCC): ICD-10-CM

## 2022-07-27 DIAGNOSIS — R00.0 TACHYCARDIA: ICD-10-CM

## 2022-07-27 LAB
ALBUMIN SERPL-MCNC: 4.2 G/DL (ref 3.5–5.2)
ALP BLD-CCNC: 26 U/L (ref 35–104)
ALT SERPL-CCNC: 12 U/L (ref 5–33)
ANION GAP SERPL CALCULATED.3IONS-SCNC: 14 MMOL/L (ref 7–19)
AST SERPL-CCNC: 16 U/L (ref 5–32)
BASOPHILS ABSOLUTE: 0 K/UL (ref 0–0.2)
BASOPHILS RELATIVE PERCENT: 0.3 % (ref 0–1)
BILIRUB SERPL-MCNC: 0.5 MG/DL (ref 0.2–1.2)
BUN BLDV-MCNC: 5 MG/DL (ref 6–20)
C-REACTIVE PROTEIN: <0.3 MG/DL (ref 0–0.5)
CALCIUM SERPL-MCNC: 9.8 MG/DL (ref 8.6–10)
CHLORIDE BLD-SCNC: 107 MMOL/L (ref 98–111)
CHOLESTEROL, FASTING: 176 MG/DL (ref 160–199)
CO2: 21 MMOL/L (ref 22–29)
CREAT SERPL-MCNC: 0.7 MG/DL (ref 0.5–0.9)
CREATININE URINE: 275.6 MG/DL (ref 4.2–622)
EOSINOPHILS ABSOLUTE: 0.1 K/UL (ref 0–0.6)
EOSINOPHILS RELATIVE PERCENT: 1.5 % (ref 0–5)
GFR AFRICAN AMERICAN: >59
GFR NON-AFRICAN AMERICAN: >60
GLUCOSE BLD-MCNC: 115 MG/DL (ref 74–109)
HBA1C MFR BLD: 5.2 % (ref 4–6)
HCT VFR BLD CALC: 40.6 % (ref 37–47)
HDLC SERPL-MCNC: 40 MG/DL (ref 65–121)
HEMOGLOBIN: 13.1 G/DL (ref 12–16)
IMMATURE GRANULOCYTES #: 0 K/UL
LDL CHOLESTEROL CALCULATED: 106 MG/DL
LYMPHOCYTES ABSOLUTE: 2.2 K/UL (ref 1.1–4.5)
LYMPHOCYTES RELATIVE PERCENT: 31.9 % (ref 20–40)
MCH RBC QN AUTO: 29.9 PG (ref 27–31)
MCHC RBC AUTO-ENTMCNC: 32.3 G/DL (ref 33–37)
MCV RBC AUTO: 92.7 FL (ref 81–99)
MICROALBUMIN UR-MCNC: 6.1 MG/DL (ref 0–19)
MICROALBUMIN/CREAT UR-RTO: 22.1 MG/G
MONOCYTES ABSOLUTE: 0.4 K/UL (ref 0–0.9)
MONOCYTES RELATIVE PERCENT: 5.1 % (ref 0–10)
NEUTROPHILS ABSOLUTE: 4.2 K/UL (ref 1.5–7.5)
NEUTROPHILS RELATIVE PERCENT: 61.1 % (ref 50–65)
PDW BLD-RTO: 12.9 % (ref 11.5–14.5)
PLATELET # BLD: 339 K/UL (ref 130–400)
PMV BLD AUTO: 10.4 FL (ref 9.4–12.3)
POTASSIUM SERPL-SCNC: 3.6 MMOL/L (ref 3.5–5)
RBC # BLD: 4.38 M/UL (ref 4.2–5.4)
SEDIMENTATION RATE, ERYTHROCYTE: 14 MM/HR (ref 0–20)
SODIUM BLD-SCNC: 142 MMOL/L (ref 136–145)
T4 FREE: 1.04 NG/DL (ref 0.93–1.7)
TOTAL PROTEIN: 6.8 G/DL (ref 6.6–8.7)
TRIGLYCERIDE, FASTING: 151 MG/DL (ref 0–149)
TSH SERPL DL<=0.05 MIU/L-ACNC: 1.78 UIU/ML (ref 0.27–4.2)
WBC # BLD: 6.8 K/UL (ref 4.8–10.8)

## 2022-07-28 ENCOUNTER — TELEPHONE (OUTPATIENT)
Dept: PRIMARY CARE CLINIC | Age: 22
End: 2022-07-28

## 2022-07-28 NOTE — TELEPHONE ENCOUNTER
Called patient, spoke with: Patient regarding the results of the patients most recent labs. I advised Patient of Dr. Chacha Rivera recommendations.    Patient did voice understanding      She is aware the other tests are send out and will get a call when they come back

## 2022-07-28 NOTE — TELEPHONE ENCOUNTER
----- Message from [address] [facility],[address], DO sent at 7/27/2022  3:34 PM CDT -----  Sed rate is normal.  C-reactive protein is normal.  These both indicate that there is not any significant amount of inflammation going on at present. There is a mild amount of protein in the urine. This is of great significance but it does indicate that we need to follow this over time. Your metabolic profile is normal.  This includes kidney and liver functions as well as electrolytes. Lipids are mildly elevated. This is within reason that you can get this down by diet and exercise alone. Please try to watch the fat and cholesterol in your diet and increase exercise as tolerated  Thyroid values are normal.  Hemoglobin A1c is 5.2 which is normal.  Your WBC, (infection fighting ability) Hgb and Hct, (oxygen carrying cells) are normal; as is your percentage of each cell type.       ROSE MARY and antidouble-stranded DNA are still pending

## 2022-07-29 LAB — ANA IGG, ELISA: NORMAL

## 2022-08-01 LAB — ANTI DNA DOUBLE STRANDED: 1.4 IU/ML

## 2022-08-02 ENCOUNTER — TELEPHONE (OUTPATIENT)
Dept: PRIMARY CARE CLINIC | Age: 22
End: 2022-08-02

## 2022-08-02 NOTE — TELEPHONE ENCOUNTER
Called patient, spoke with: Patient regarding the results of the patients most recent labs. I advised Patient of Dr. Michael Ordoñez recommendations.    Patient did voice understanding

## 2022-08-02 NOTE — TELEPHONE ENCOUNTER
----- Message from EVELYN Hernandes sent at 8/2/2022 12:11 PM CDT -----  Eloise negative  Anti dna normal no concerns

## 2022-08-08 ENCOUNTER — TELEMEDICINE (OUTPATIENT)
Dept: PRIMARY CARE CLINIC | Age: 22
End: 2022-08-08
Payer: MEDICAID

## 2022-08-08 DIAGNOSIS — M25.561 CHRONIC PAIN OF BOTH KNEES: ICD-10-CM

## 2022-08-08 DIAGNOSIS — I10 PRIMARY HYPERTENSION: ICD-10-CM

## 2022-08-08 DIAGNOSIS — T14.8XXA MUSCLE STRAIN: ICD-10-CM

## 2022-08-08 DIAGNOSIS — F41.9 ANXIETY: Primary | ICD-10-CM

## 2022-08-08 DIAGNOSIS — M54.50 ACUTE MIDLINE LOW BACK PAIN WITHOUT SCIATICA: ICD-10-CM

## 2022-08-08 DIAGNOSIS — G89.29 CHRONIC PAIN OF BOTH KNEES: ICD-10-CM

## 2022-08-08 DIAGNOSIS — M25.562 CHRONIC PAIN OF BOTH KNEES: ICD-10-CM

## 2022-08-08 PROCEDURE — 99214 OFFICE O/P EST MOD 30 MIN: CPT | Performed by: PEDIATRICS

## 2022-08-08 RX ORDER — PREDNISONE 10 MG/1
TABLET ORAL
Qty: 43 TABLET | Refills: 0 | Status: SHIPPED | OUTPATIENT
Start: 2022-08-08 | End: 2022-09-21

## 2022-08-08 ASSESSMENT — ENCOUNTER SYMPTOMS
COLOR CHANGE: 1
RESPIRATORY NEGATIVE: 1
EYES NEGATIVE: 1
GASTROINTESTINAL NEGATIVE: 1
BACK PAIN: 1

## 2022-08-16 DIAGNOSIS — G47.09 OTHER INSOMNIA: ICD-10-CM

## 2022-08-16 RX ORDER — HYDROXYZINE HYDROCHLORIDE 25 MG/1
25 TABLET, FILM COATED ORAL EVERY 6 HOURS PRN
Qty: 90 TABLET | Refills: 5 | Status: SHIPPED | OUTPATIENT
Start: 2022-08-16

## 2022-08-16 NOTE — TELEPHONE ENCOUNTER
Received fax from pharmacy requesting refill on pts medication(s). Pt was last seen in office on 8/8/2022  and has a follow up scheduled for 9/21/2022. Will send request to  Pharmacy  for authorization.      Requested Prescriptions     Signed Prescriptions Disp Refills    hydrOXYzine HCl (ATARAX) 25 MG tablet 90 tablet 5     Sig: Take 1 tablet by mouth every 6 hours as needed for Itching     Authorizing Provider: Kristal Gonzalez     Ordering User: Hannah Baca

## 2022-08-31 ENCOUNTER — PATIENT MESSAGE (OUTPATIENT)
Dept: PRIMARY CARE CLINIC | Age: 22
End: 2022-08-31

## 2022-08-31 RX ORDER — CLINDAMYCIN HYDROCHLORIDE 300 MG/1
300 CAPSULE ORAL 3 TIMES DAILY
Qty: 30 CAPSULE | Refills: 0 | Status: SHIPPED | OUTPATIENT
Start: 2022-08-31 | End: 2022-09-10

## 2022-08-31 NOTE — TELEPHONE ENCOUNTER
Clindamycin 300 mg 3 times daily x10 days  Dispense #30 with no refills.   Treating the infection is the treatment for the pain

## 2022-09-09 DIAGNOSIS — G47.09 OTHER INSOMNIA: Primary | ICD-10-CM

## 2022-09-09 RX ORDER — NORGESTIMATE AND ETHINYL ESTRADIOL 0.25-0.035
1 KIT ORAL DAILY
Qty: 84 TABLET | Refills: 3 | Status: SHIPPED | OUTPATIENT
Start: 2022-09-09

## 2022-09-09 NOTE — TELEPHONE ENCOUNTER
Received fax from pharmacy requesting refill on pts medication(s). Pt was last seen in office on 8/8/2022  and has a follow up scheduled for 9/21/2022. Will send request to  Dr. Charley Louis  for authorization.      Requested Prescriptions     Pending Prescriptions Disp Refills    norgestimate-ethinyl estradiol (ORTHO-CYCLEN) 0.25-35 MG-MCG per tablet [Pharmacy Med Name: norgestimate 0.25 mg-ethinyl estradiol 35 mcg tablet] 84 tablet 3     Sig: Take 1 tablet by mouth daily

## 2022-09-13 DIAGNOSIS — F32.A ANXIETY AND DEPRESSION: ICD-10-CM

## 2022-09-13 DIAGNOSIS — F41.9 ANXIETY AND DEPRESSION: ICD-10-CM

## 2022-09-13 RX ORDER — VENLAFAXINE HYDROCHLORIDE 75 MG/1
75 CAPSULE, EXTENDED RELEASE ORAL DAILY
Qty: 30 CAPSULE | Refills: 3 | Status: SHIPPED | OUTPATIENT
Start: 2022-09-13

## 2022-09-13 RX ORDER — VENLAFAXINE HYDROCHLORIDE 150 MG/1
150 CAPSULE, EXTENDED RELEASE ORAL DAILY
Qty: 90 CAPSULE | Refills: 2 | Status: SHIPPED | OUTPATIENT
Start: 2022-09-13

## 2022-09-13 NOTE — TELEPHONE ENCOUNTER
Received fax from pharmacy requesting refill on pts medication(s). Pt was last seen in office on 8/8/2022  and has a follow up scheduled for 9/21/2022. Will send request to  Dr. Ashok Camejo  for authorization.      Requested Prescriptions     Pending Prescriptions Disp Refills    venlafaxine (EFFEXOR XR) 75 MG extended release capsule [Pharmacy Med Name: venlafaxine ER 75 mg capsule,extended release 24 hr] 30 capsule 3     Sig: Take 1 capsule by mouth daily    venlafaxine (EFFEXOR XR) 150 MG extended release capsule [Pharmacy Med Name: venlafaxine  mg capsule,extended release 24 hr] 90 capsule 2     Sig: Take 1 capsule by mouth daily

## 2022-09-21 ENCOUNTER — OFFICE VISIT (OUTPATIENT)
Dept: PRIMARY CARE CLINIC | Age: 22
End: 2022-09-21
Payer: MEDICAID

## 2022-09-21 ENCOUNTER — HOSPITAL ENCOUNTER (OUTPATIENT)
Dept: GENERAL RADIOLOGY | Age: 22
Discharge: HOME | End: 2022-09-21
Payer: MEDICAID

## 2022-09-21 VITALS
OXYGEN SATURATION: 98 % | HEIGHT: 68 IN | SYSTOLIC BLOOD PRESSURE: 118 MMHG | TEMPERATURE: 97.3 F | HEART RATE: 108 BPM | WEIGHT: 293 LBS | BODY MASS INDEX: 44.41 KG/M2 | DIASTOLIC BLOOD PRESSURE: 82 MMHG

## 2022-09-21 DIAGNOSIS — M17.12 PRIMARY OSTEOARTHRITIS OF LEFT KNEE: ICD-10-CM

## 2022-09-21 DIAGNOSIS — J45.31 MILD PERSISTENT ASTHMA WITH ACUTE EXACERBATION: ICD-10-CM

## 2022-09-21 DIAGNOSIS — I10 PRIMARY HYPERTENSION: Primary | ICD-10-CM

## 2022-09-21 DIAGNOSIS — F32.A ANXIETY AND DEPRESSION: ICD-10-CM

## 2022-09-21 DIAGNOSIS — E78.2 MIXED HYPERLIPIDEMIA: ICD-10-CM

## 2022-09-21 DIAGNOSIS — F41.9 ANXIETY AND DEPRESSION: ICD-10-CM

## 2022-09-21 PROCEDURE — 73562 X-RAY EXAM OF KNEE 3: CPT

## 2022-09-21 PROCEDURE — 99214 OFFICE O/P EST MOD 30 MIN: CPT | Performed by: PEDIATRICS

## 2022-09-21 RX ORDER — ALBUTEROL SULFATE 90 UG/1
2 AEROSOL, METERED RESPIRATORY (INHALATION) 4 TIMES DAILY PRN
Qty: 18 G | Refills: 5 | Status: SHIPPED | OUTPATIENT
Start: 2022-09-21

## 2022-09-21 RX ORDER — IPRATROPIUM BROMIDE AND ALBUTEROL SULFATE 2.5; .5 MG/3ML; MG/3ML
1 SOLUTION RESPIRATORY (INHALATION) EVERY 6 HOURS
Qty: 360 ML | Refills: 1 | Status: SHIPPED | OUTPATIENT
Start: 2022-09-21 | End: 2022-10-24

## 2022-09-21 NOTE — PROGRESS NOTES
[facility] [address], [address]  Phone [phone]   Fax [phone]      OFFICE VISIT: 2022    Michela Carrillo Darion-: 2000      HPI  Reason For Visit:  Michela Carrillo is a 25 y.o.     1 Month Follow-Up (Left knee pain has been worse the past 3 days, it has been swelling more than normal. ) and Medication Refill (duoneb)    Patient presents on 1 month follow-up for anxiety. She also presents on follow-up for left knee pain. See office note from 2022. Anxiety and depression:  Medication:              Effexor  mg daily (75 mg +150 mg)  Lamotrigine 200mg bid. Hydroxyzine 25 mg as needed              BuSpar 5 mg twice daily as needed              Amitriptyline 25 mg nightly  Symptoms: this is fairly well controlled. Knee pain:  She states that she has had swelling in her knee (mostly laterally). The more that she is on it, the more it bothers her. Cold also bothers her knee. Medication:  Ibuprofen 800 mg every 8 hours as needed (she takes this regularly)   This helps, or sometimes she will take Naproxen  Symptoms: As above  Xrays were normal about a year ago. Heat seems to help some as well, (heating pad)      Hypertension:   BP today was   BP Readings from Last 1 Encounters:   22 118/82      Recent BP readings:    BP Readings from Last 3 Encounters:   22 118/82   22 124/84   20 134/88     Medication   Amlodipine 5 mg twice daily   Toprol-XL 50 mg daily  Medication compliance:  compliant most of the time  Home blood pressure monitoring: No.    She is not adherent to a low sodium diet. Symptoms: none  Laboratory:  Lab Results   Component Value Date    BUN 5 (L) 2022    CREATININE 0.7 2022       Hyperlipidemia:   Medication:   atorvastatin (Lipitor) 40 mg nightly  Low Fat, Low Choleterol Diet:  yes - some  Myalgias or GI upset: no  The patient exercises intermittently.   Laboratory:    Lab Results Component Value Date    CHOL 237 (H) 04/14/2022    TRIG 88 04/14/2022    HDL 40 (L) 07/27/2022    LDLCALC 106 07/27/2022    LDLDIRECT 106 07/09/2014      Lab Results   Component Value Date    ALT 12 07/27/2022    AST 16 07/27/2022       Asthma:  Medication:   DuoNebs 0.5/2.5 mg per neb 4 times daily as needed              Albuterol HFA 2 puffs every 4 hours as needed   Singulair 10 mg nightly  Symptoms: she is well controlled at this time. She does need a refill of DuoNeb     height is 5' 8\" (1.727 m) and weight is 309 lb (140.2 kg) (abnormal). Her temporal temperature is 97.3 °F (36.3 °C). Her blood pressure is 118/82 and her pulse is 108 (abnormal). Her oxygen saturation is 98%. Weight is stable  Body mass index is 46.98 kg/m². I have reviewed the following with the Ms. Orlando   Lab Review  Orders Only on 07/27/2022   Component Date Value    Microalbumin, Random Uri* 07/27/2022 6.10     Creatinine, Ur 07/27/2022 275.6     Microalbumin Creatinine * 07/27/2022 22.1     T4 Free 07/27/2022 1.04     TSH 07/27/2022 1.780     Hemoglobin A1C 07/27/2022 5.2     Sodium 07/27/2022 142     Potassium 07/27/2022 3.6     Chloride 07/27/2022 107     CO2 07/27/2022 21 (A)    Anion Gap 07/27/2022 14     Glucose 07/27/2022 115 (A)    BUN 07/27/2022 5 (A)    Creatinine 07/27/2022 0.7     GFR Non- 07/27/2022 >60     GFR  07/27/2022 >59     Calcium 07/27/2022 9.8     Total Protein 07/27/2022 6.8     Albumin 07/27/2022 4.2     Total Bilirubin 07/27/2022 0.5     Alkaline Phosphatase 07/27/2022 26 (A)    ALT 07/27/2022 12     AST 07/27/2022 16     WBC 07/27/2022 6.8     RBC 07/27/2022 4.38     Hemoglobin 07/27/2022 13.1     Hematocrit 07/27/2022 40.6     MCV 07/27/2022 92.7     MCH 07/27/2022 29.9     MCHC 07/27/2022 32.3 (A)    RDW 07/27/2022 12.9     Platelets 39/64/4183 339     MPV 07/27/2022 10.4     Neutrophils % 07/27/2022 61.1     Lymphocytes % 07/27/2022 31.9     Monocytes % 07/27/2022 5.1     Eosinophils % 07/27/2022 1.5     Basophils % 07/27/2022 0.3     Neutrophils Absolute 07/27/2022 4.2     Immature Granulocytes # 07/27/2022 0.0     Lymphocytes Absolute 07/27/2022 2.2     Monocytes Absolute 07/27/2022 0.40     Eosinophils Absolute 07/27/2022 0.10     Basophils Absolute 07/27/2022 0.00     Cholesterol, Fasting 07/27/2022 176     Triglyceride, Fasting 07/27/2022 151 (A)    HDL 07/27/2022 40 (A)    LDL Calculated 07/27/2022 106     CRP 07/27/2022 <0.30     Sed Rate 07/27/2022 14     Anti ds DNA 07/27/2022 1.4     ROSE MARY Ab, IgG LYDIA 07/27/2022 None Detected    Orders Only on 04/14/2022   Component Date Value    WBC 04/14/2022 6.7     RBC 04/14/2022 4.43     Hemoglobin 04/14/2022 13.5     Hematocrit 04/14/2022 42.7     MCV 04/14/2022 96.4     MCH 04/14/2022 30.5     MCHC 04/14/2022 31.6 (A)    RDW 04/14/2022 14.0     Platelets 71/65/6589 358     MPV 04/14/2022 9.6     Neutrophils % 04/14/2022 60.2     Lymphocytes % 04/14/2022 33.0     Monocytes % 04/14/2022 5.2     Eosinophils % 04/14/2022 0.9     Basophils % 04/14/2022 0.4     Neutrophils Absolute 04/14/2022 4.0     Immature Granulocytes # 04/14/2022 0.0     Lymphocytes Absolute 04/14/2022 2.2     Monocytes Absolute 04/14/2022 0.40     Eosinophils Absolute 04/14/2022 0.10     Basophils Absolute 04/14/2022 0.00     Sodium 04/14/2022 143     Potassium 04/14/2022 4.3     Chloride 04/14/2022 105     CO2 04/14/2022 20 (A)    Anion Gap 04/14/2022 18     Glucose 04/14/2022 90     BUN 04/14/2022 7     Creatinine 04/14/2022 0.7     GFR Non- 04/14/2022 >60     GFR  04/14/2022 >59     Calcium 04/14/2022 9.9     Total Protein 04/14/2022 6.7     Albumin 04/14/2022 4.4     Total Bilirubin 04/14/2022 0.6     Alkaline Phosphatase 04/14/2022 22 (A)    ALT 04/14/2022 14     AST 04/14/2022 16     Cholesterol, Total 04/14/2022 237 (A)    Triglycerides 04/14/2022 88     HDL 04/14/2022 61 (A)    LDL Calculated 04/14/2022 158 mouth daily 30 tablet 11    ibuprofen (ADVIL;MOTRIN) 800 MG tablet Take 1 tablet by mouth every 8 hours as needed for Pain With food or milk 120 tablet 3    lamoTRIgine (LAMICTAL) 200 MG tablet Take 1 tablet by mouth 2 times daily 60 tablet 11    Multiple Vitamin (MULTI-DAY VITAMINS PO) Take 1 tablet by mouth daily. No current facility-administered medications for this visit. Allergies: Eggs or egg-derived products, Bactrim [sulfamethoxazole-trimethoprim], Elemental sulfur, Other, Pcn [penicillins], and Sulfa antibiotics     Past Medical History:   Diagnosis Date    Asthma     History of Chiari malformation     Seizures (HCC)     VSD (ventricular septal defect)        Family History   Problem Relation Age of Onset    Asthma Mother     Lupus Mother     High Blood Pressure Mother     Asthma Maternal Grandmother     Asthma Maternal Grandfather     Cancer Maternal Grandfather     Stroke Maternal Grandfather     Diabetes Maternal Grandfather     Asthma Paternal Grandmother     Stroke Paternal Grandmother     Diabetes Paternal Grandmother     Asthma Paternal Grandfather        Past Surgical History:   Procedure Laterality Date    ADENOIDECTOMY      OTHER SURGICAL HISTORY      chiari decompression    TONSILLECTOMY      TYMPANOSTOMY TUBE PLACEMENT      VENTRICULOPERITONEAL SHUNT         Social History     Tobacco Use    Smoking status: Never    Smokeless tobacco: Never   Substance Use Topics    Alcohol use: No        Review of Systems    Physical Exam  Vitals reviewed. Constitutional:       General: She is not in acute distress. Appearance: She is well-developed. She is not toxic-appearing. Comments: Body habitus is ob   HENT:      Head: Normocephalic and atraumatic.       Right Ear: Hearing, tympanic membrane, ear canal and external ear normal.      Left Ear: Hearing, tympanic membrane, ear canal and external ear normal.      Nose: Nose normal.      Mouth/Throat:      Mouth: Mucous membranes are moist.      Pharynx: Oropharynx is clear. No posterior oropharyngeal erythema. Eyes:      General: Lids are normal.      Extraocular Movements: Extraocular movements intact. Conjunctiva/sclera: Conjunctivae normal.      Pupils: Pupils are equal, round, and reactive to light. Neck:      Thyroid: No thyromegaly. Vascular: No carotid bruit or JVD. Trachea: Phonation normal.   Cardiovascular:      Rate and Rhythm: Normal rate and regular rhythm. No extrasystoles are present. Chest Wall: PMI is not displaced. Pulses: Normal pulses. Heart sounds: Normal heart sounds. No murmur heard. No friction rub. No gallop. Comments: HR was in the 80's on exam  Pulmonary:      Effort: Pulmonary effort is normal. No respiratory distress. Breath sounds: No wheezing, rhonchi or rales. Abdominal:      General: Bowel sounds are normal. There is no distension. Palpations: Abdomen is soft. There is no mass. Tenderness: There is no abdominal tenderness. Genitourinary:     Comments: Examination deferred  Musculoskeletal:         General: Swelling (mild of left knee) and tenderness (with manipulation) present. Normal range of motion. Cervical back: Neck supple. Comments: Joint examination reveals no acute arthritis or synovitis. No ligamentous abnormality by evaluation. There is a significant amount of crepitus in L knee. Lymphadenopathy:      Cervical: No cervical adenopathy. Skin:     General: Skin is warm and dry. Findings: No rash. Neurological:      General: No focal deficit present. Mental Status: She is alert and oriented to person, place, and time. Cranial Nerves: No cranial nerve deficit (by gross examination). Sensory: No sensory deficit. Motor: No tremor or atrophy.       Gait: Gait normal.      Comments: No focal deficits appreciated   Psychiatric:         Speech: Speech normal.         Behavior: Behavior normal. Behavior is

## 2022-09-23 ENCOUNTER — TELEPHONE (OUTPATIENT)
Dept: PRIMARY CARE CLINIC | Age: 22
End: 2022-09-23

## 2022-09-23 DIAGNOSIS — M17.12 PRIMARY OSTEOARTHRITIS OF LEFT KNEE: Primary | ICD-10-CM

## 2022-09-23 NOTE — TELEPHONE ENCOUNTER
Called patient, spoke with: Patient regarding the results of the patients most recent xrays. I advised Patient of Dr. Amada Mcnulty recommendations.    Patient did voice understanding

## 2022-09-23 NOTE — TELEPHONE ENCOUNTER
----- Message from [address] [facility],[address], DO sent at 9/22/2022  3:51 PM CDT -----  X-ray of the left knee shows no bony abnormalities. This could indicate that this is a soft tissue injury. That could be as simple as a bursitis or a ligamentous injury.   I would recommend a trial of physical therapy to see if we get this better or  Please set this up for her

## 2022-09-25 ENCOUNTER — PATIENT MESSAGE (OUTPATIENT)
Dept: PRIMARY CARE CLINIC | Age: 22
End: 2022-09-25

## 2022-09-26 DIAGNOSIS — Z91.09 ENVIRONMENTAL ALLERGIES: ICD-10-CM

## 2022-09-26 RX ORDER — CEFDINIR 300 MG/1
300 CAPSULE ORAL 2 TIMES DAILY
Qty: 20 CAPSULE | Refills: 0 | Status: SHIPPED | OUTPATIENT
Start: 2022-09-26 | End: 2022-10-06

## 2022-09-26 RX ORDER — FLUTICASONE PROPIONATE 50 MCG
2 SPRAY, SUSPENSION (ML) NASAL DAILY
Qty: 48 G | Refills: 1 | Status: SHIPPED | OUTPATIENT
Start: 2022-09-26

## 2022-09-26 NOTE — TELEPHONE ENCOUNTER
Received fax from pharmacy requesting refill on pts medication(s). Pt was last seen in office on 9/21/2022  and has a follow up scheduled for 12/30/2022. Will send request to  Dr. Peter Humphrey  for authorization.      Requested Prescriptions     Pending Prescriptions Disp Refills    fluticasone (FLONASE) 50 MCG/ACT nasal spray [Pharmacy Med Name: fluticasone propionate 50 mcg/actuation nasal spray,suspension] 48 g 1     Sig: SPRAY TWO SPRAYS INTO EACH NOSTRIL DAILY

## 2022-09-28 DIAGNOSIS — J01.01 ACUTE RECURRENT MAXILLARY SINUSITIS: ICD-10-CM

## 2022-09-28 RX ORDER — DEXTROMETHORPHAN HYDROBROMIDE AND PROMETHAZINE HYDROCHLORIDE 15; 6.25 MG/5ML; MG/5ML
5 SYRUP ORAL 4 TIMES DAILY PRN
Qty: 180 ML | Refills: 0 | Status: SHIPPED | OUTPATIENT
Start: 2022-09-28 | End: 2022-10-07

## 2022-09-28 NOTE — PROGRESS NOTES
[address] [facility] [place], [place], [address]  Phone [phone]   Fax [phone]      OFFICE VISIT: 2022    Glenda Orlando-: 2000      HPI  Reason For Visit:  Glenda Marcial is a 25 y.o. No chief complaint on file. vitals were not taken for this visit. There is no height or weight on file to calculate BMI. I have reviewed the following with the Ms. Orlando   Lab Review  Orders Only on 2022   Component Date Value    Microalbumin, Random Uri* 2022 6.10     Creatinine, Ur 2022 275.6     Microalbumin Creatinine * 2022 22.1     T4 Free 2022 1.04     TSH 2022 1.780     Hemoglobin A1C 2022 5.2     Sodium 2022 142     Potassium 2022 3.6     Chloride 2022 107     CO2 2022 21 (A)     Anion Gap 2022 14     Glucose 2022 115 (A)     BUN 2022 5 (A)     Creatinine 2022 0.7     GFR Non- 2022 >60     GFR  2022 >59     Calcium 2022 9.8     Total Protein 2022 6.8     Albumin 2022 4.2     Total Bilirubin 2022 0.5     Alkaline Phosphatase 2022 26 (A)     ALT 2022 12     AST 2022 16     WBC 2022 6.8     RBC 2022 4.38     Hemoglobin 2022 13.1     Hematocrit 2022 40.6     MCV 2022 92.7     MCH 2022 29.9     MCHC 2022 32.3 (A)     RDW 2022 12.9     Platelets  339     MPV 2022 10.4     Neutrophils % 2022 61.1     Lymphocytes % 2022 31.9     Monocytes % 2022 5.1     Eosinophils % 2022 1.5     Basophils % 2022 0.3     Neutrophils Absolute 2022 4.2     Immature Granulocytes # 2022 0.0     Lymphocytes Absolute 2022 2.2     Monocytes Absolute 2022 0.40     Eosinophils Absolute 2022 0.10     Basophils Absolute 2022 0.00     Cholesterol, Fasting 2022 176     Triglyceride, Fasting capsule 0    fluticasone (FLONASE) 50 MCG/ACT nasal spray 2 sprays by Nasal route daily 48 g 1    ipratropium-albuterol (DUONEB) 0.5-2.5 (3) MG/3ML SOLN nebulizer solution Inhale 3 mLs into the lungs in the morning and 3 mLs at noon and 3 mLs in the evening and 3 mLs before bedtime.  360 mL 1    albuterol sulfate HFA (VENTOLIN HFA) 108 (90 Base) MCG/ACT inhaler Inhale 2 puffs into the lungs 4 times daily as needed for Wheezing 18 g 5    venlafaxine (EFFEXOR XR) 75 MG extended release capsule Take 1 capsule by mouth daily 30 capsule 3    venlafaxine (EFFEXOR XR) 150 MG extended release capsule Take 1 capsule by mouth daily 90 capsule 2    norgestimate-ethinyl estradiol (ORTHO-CYCLEN) 0.25-35 MG-MCG per tablet Take 1 tablet by mouth daily 84 tablet 3    hydrOXYzine HCl (ATARAX) 25 MG tablet Take 1 tablet by mouth every 6 hours as needed for Itching 90 tablet 5    amitriptyline (ELAVIL) 25 MG tablet TAKE ONE TABLET BY MOUTH AT BEDTIME 30 tablet 5    naproxen (NAPROSYN) 500 MG tablet TAKE ONE TABLET BY MOUTH TWICE DAILY 60 tablet 3    amLODIPine (NORVASC) 5 MG tablet TAKE ONE TABLET BY MOUTH IN THE EVENING 180 tablet 0    piroxicam (FELDENE) 20 MG capsule TAKE ONE CAPSULE BY MOUTH DAILY 30 capsule 3    omeprazole (PRILOSEC) 40 MG delayed release capsule Take 1 capsule by mouth every morning (before breakfast) 60 capsule 5    montelukast (SINGULAIR) 10 MG tablet TAKE ONE TABLET BY MOUTH DAILY 90 tablet 3    atorvastatin (LIPITOR) 40 MG tablet TAKE ONE TABLET BY MOUTH DAILY 90 tablet 3    busPIRone (BUSPAR) 5 MG tablet Take 1 tablet by mouth 2 times daily 180 tablet 3    metoprolol succinate (TOPROL XL) 50 MG extended release tablet Take 1 tablet by mouth daily 30 tablet 11    ibuprofen (ADVIL;MOTRIN) 800 MG tablet Take 1 tablet by mouth every 8 hours as needed for Pain With food or milk 120 tablet 3    lamoTRIgine (LAMICTAL) 200 MG tablet Take 1 tablet by mouth 2 times daily 60 tablet 11    Multiple Vitamin (MULTI-DAY VITAMINS PO) Take 1 tablet by mouth daily. No current facility-administered medications for this visit. Allergies: Eggs or egg-derived products, Bactrim [sulfamethoxazole-trimethoprim], Elemental sulfur, Other, Pcn [penicillins], and Sulfa antibiotics     Past Medical History:   Diagnosis Date    Asthma     History of Chiari malformation     Seizures (HCC)     VSD (ventricular septal defect)        Family History   Problem Relation Age of Onset    Asthma Mother     Lupus Mother     High Blood Pressure Mother     Asthma Maternal Grandmother     Asthma Maternal Grandfather     Cancer Maternal Grandfather     Stroke Maternal Grandfather     Diabetes Maternal Grandfather     Asthma Paternal Grandmother     Stroke Paternal Grandmother     Diabetes Paternal Grandmother     Asthma Paternal Grandfather        Past Surgical History:   Procedure Laterality Date    ADENOIDECTOMY      OTHER SURGICAL HISTORY      chiari decompression    TONSILLECTOMY      TYMPANOSTOMY TUBE PLACEMENT      VENTRICULOPERITONEAL SHUNT         Social History     Tobacco Use    Smoking status: Never    Smokeless tobacco: Never   Substance Use Topics    Alcohol use: No        Review of Systems    Physical Exam        ASSESSMENT    No diagnosis found. PLAN    There are no diagnoses linked to this encounter. No orders of the defined types were placed in this encounter. No follow-ups on file.

## 2022-10-06 DIAGNOSIS — J01.01 ACUTE RECURRENT MAXILLARY SINUSITIS: ICD-10-CM

## 2022-10-07 RX ORDER — DEXTROMETHORPHAN HYDROBROMIDE AND PROMETHAZINE HYDROCHLORIDE 15; 6.25 MG/5ML; MG/5ML
SYRUP ORAL
Qty: 180 ML | Refills: 0 | Status: SHIPPED | OUTPATIENT
Start: 2022-10-07

## 2022-10-07 NOTE — TELEPHONE ENCOUNTER
Received fax from pharmacy requesting refill on pts medication(s). Pt was last seen in office on 9/21/2022  and has a follow up scheduled for 12/30/2022. Will send request to  Dr. Medardo Montero  for authorization.      Requested Prescriptions     Pending Prescriptions Disp Refills    promethazine-dextromethorphan (PROMETHAZINE-DM) 6.25-15 MG/5ML syrup [Pharmacy Med Name: promethazine-DM 6.25 mg-15 mg/5 mL oral syrup] 180 mL 0     Sig: TAKE 5ml BY MOUTH FOUR TIMES DAILY AS NEEDED FOR cough      Confirmed with patient she does need this refilled
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
## 2022-10-12 DIAGNOSIS — M25.572 ACUTE LEFT ANKLE PAIN: ICD-10-CM

## 2022-10-12 DIAGNOSIS — M25.562 ACUTE PAIN OF LEFT KNEE: ICD-10-CM

## 2022-10-12 DIAGNOSIS — M79.672 LEFT FOOT PAIN: ICD-10-CM

## 2022-10-12 RX ORDER — NAPROXEN 500 MG/1
TABLET ORAL
Qty: 60 TABLET | Refills: 3 | Status: SHIPPED | OUTPATIENT
Start: 2022-10-12

## 2022-10-12 NOTE — TELEPHONE ENCOUNTER
Received fax from pharmacy requesting refill on pts medication(s). Pt was last seen in office on 9/21/2022  and has a follow up scheduled for 12/30/2022. Will send request to  Dr. Neetu Marinelli  for authorization.      Requested Prescriptions     Pending Prescriptions Disp Refills    naproxen (NAPROSYN) 500 MG tablet [Pharmacy Med Name: naproxen 500 mg tablet] 60 tablet 3     Sig: TAKE ONE TABLET BY MOUTH TWICE DAILY

## 2022-10-24 DIAGNOSIS — J45.31 MILD PERSISTENT ASTHMA WITH ACUTE EXACERBATION: ICD-10-CM

## 2022-10-24 RX ORDER — IPRATROPIUM BROMIDE AND ALBUTEROL SULFATE 2.5; .5 MG/3ML; MG/3ML
SOLUTION RESPIRATORY (INHALATION)
Qty: 360 ML | Refills: 1 | Status: SHIPPED | OUTPATIENT
Start: 2022-10-24

## 2022-10-25 RX ORDER — AMLODIPINE BESYLATE 5 MG/1
5 TABLET ORAL DAILY
Qty: 180 TABLET | Refills: 0 | Status: SHIPPED | OUTPATIENT
Start: 2022-10-25

## 2022-10-25 NOTE — TELEPHONE ENCOUNTER
Received fax from pharmacy requesting refill on pts medication(s). Pt was last seen in office on 9/21/2022  and has a follow up scheduled for 12/30/2022. Will send request to  Dr. Gaudencio Manjarrez  for authorization.      Requested Prescriptions     Pending Prescriptions Disp Refills    amLODIPine (NORVASC) 5 MG tablet [Pharmacy Med Name: amlodipine 5 mg tablet] 180 tablet 0     Sig: TAKE ONE TABLET BY MOUTH IN THE EVENING

## 2022-10-31 DIAGNOSIS — M25.50 POLYARTHRALGIA: ICD-10-CM

## 2022-10-31 RX ORDER — IBUPROFEN 800 MG/1
TABLET ORAL
Qty: 120 TABLET | Refills: 3 | Status: SHIPPED | OUTPATIENT
Start: 2022-10-31

## 2022-10-31 NOTE — TELEPHONE ENCOUNTER
Received fax from pharmacy requesting refill on pts medication(s). Pt was last seen in office on 9/21/2022  and has a follow up scheduled for 12/30/2022. Will send request to  Dr. Laury Severin  for authorization.      Requested Prescriptions     Pending Prescriptions Disp Refills    ibuprofen (ADVIL;MOTRIN) 800 MG tablet [Pharmacy Med Name: ibuprofen 800 mg tablet] 120 tablet 3     Sig: TAKE ONE TABLET BY MOUTH EVERY 8 HOURS AS NEEDED FOR PAIN WITH FOOD OR MILK

## 2022-11-18 DIAGNOSIS — J45.31 MILD PERSISTENT ASTHMA WITH ACUTE EXACERBATION: ICD-10-CM

## 2022-11-18 RX ORDER — ALBUTEROL SULFATE 90 UG/1
2 AEROSOL, METERED RESPIRATORY (INHALATION) 4 TIMES DAILY
Qty: 18 G | Refills: 5 | Status: SHIPPED | OUTPATIENT
Start: 2022-11-18

## 2022-11-18 NOTE — TELEPHONE ENCOUNTER
Received fax from pharmacy requesting refill on pts medication(s). Pt was last seen in office on 9/21/2022  and has a follow up scheduled for 12/30/2022. Will send request to  Dr. Vanita Ayala  for authorization.      Requested Prescriptions     Pending Prescriptions Disp Refills    VENTOLIN  (90 Base) MCG/ACT inhaler [Pharmacy Med Name: Ventolin HFA 90 mcg/actuation aerosol inhaler] 18 g 5     Sig: INHALE TWO PUFFS INTO THE LUNGS FOUR TIMES DAILY AS NEEDED FOR WHEEZING

## 2022-11-22 ENCOUNTER — OFFICE VISIT (OUTPATIENT)
Dept: PRIMARY CARE CLINIC | Age: 22
End: 2022-11-22
Payer: MEDICAID

## 2022-11-22 DIAGNOSIS — Z23 NEED FOR INFLUENZA VACCINATION: Primary | ICD-10-CM

## 2022-11-22 PROCEDURE — 90471 IMMUNIZATION ADMIN: CPT | Performed by: NURSE PRACTITIONER

## 2022-11-22 PROCEDURE — 90674 CCIIV4 VAC NO PRSV 0.5 ML IM: CPT | Performed by: NURSE PRACTITIONER

## 2022-11-22 PROCEDURE — 99999 PR OFFICE/OUTPT VISIT,PROCEDURE ONLY: CPT | Performed by: NURSE PRACTITIONER

## 2022-11-22 NOTE — PROGRESS NOTES
After obtaining consent, and per orders of [facility], injection of flu shot given in Left deltoid by Namita Holloway MA. Patient instructed to remain in clinic for 20 minutes afterwards, and to report any adverse reaction to me immediately.

## 2022-11-30 ENCOUNTER — SCHEDULED TELEPHONE ENCOUNTER (OUTPATIENT)
Dept: PRIMARY CARE CLINIC | Age: 22
End: 2022-11-30
Payer: MEDICAID

## 2022-11-30 DIAGNOSIS — I10 PRIMARY HYPERTENSION: Primary | ICD-10-CM

## 2022-11-30 DIAGNOSIS — G47.09 OTHER INSOMNIA: ICD-10-CM

## 2022-11-30 DIAGNOSIS — J45.40 MODERATE PERSISTENT ASTHMA WITHOUT COMPLICATION: ICD-10-CM

## 2022-11-30 DIAGNOSIS — M79.671 RIGHT FOOT PAIN: ICD-10-CM

## 2022-11-30 DIAGNOSIS — F41.9 ANXIETY AND DEPRESSION: ICD-10-CM

## 2022-11-30 DIAGNOSIS — K21.9 GASTROESOPHAGEAL REFLUX DISEASE, UNSPECIFIED WHETHER ESOPHAGITIS PRESENT: ICD-10-CM

## 2022-11-30 DIAGNOSIS — F32.A ANXIETY AND DEPRESSION: ICD-10-CM

## 2022-11-30 PROCEDURE — 99214 OFFICE O/P EST MOD 30 MIN: CPT | Performed by: PEDIATRICS

## 2022-11-30 RX ORDER — HYDROXYZINE HYDROCHLORIDE 25 MG/1
25 TABLET, FILM COATED ORAL EVERY 6 HOURS PRN
Qty: 90 TABLET | Refills: 5 | Status: SHIPPED | OUTPATIENT
Start: 2022-11-30

## 2022-11-30 ASSESSMENT — ENCOUNTER SYMPTOMS
ALLERGIC/IMMUNOLOGIC NEGATIVE: 1
GASTROINTESTINAL NEGATIVE: 1
RESPIRATORY NEGATIVE: 1
EYES NEGATIVE: 1

## 2022-11-30 NOTE — PROGRESS NOTES
[facility], [address]  Phone [phone]   Fax [phone]      OFFICE VISIT: 2022    Shazia Isbell Darion-: 2000      HPI  Reason For Visit:  Shazia Isbell is a 25 y.o. Foot Pain (an), Anxiety, and Depression    Patient presents via [employer] video conferencing. She complains of right foot pain. She is having pain over the lateral blade of the foot. This is painful to bear weight on. She also has pain in the right heel. No specific injury that she can recall. She does have swelling but no bruising. She is wearing a brace and this seems to be helping some. This is a velcro ankle/ foot brace. Hypertension:   BP today was   BP Readings from Last 1 Encounters:   22 118/82      Recent BP readings:    BP Readings from Last 3 Encounters:   22 118/82   22 124/84   20 134/88     Medication   Amlodipine 5 mg daily   Toprol-XL 50 mg daily  Medication compliance:  compliant most of the time  Home blood pressure monitoring: No.    She is adherent to a low sodium diet. Symptoms: none  Laboratory:  Lab Results   Component Value Date    BUN 5 (L) 2022    CREATININE 0.7 2022       Anxiety and depression:  Medication:   Effexor  mg daily (150 mg +75 mg)  BuSpar 5 mg twice daily as needed  Symptoms:doing ok on this regimen. She feels like she may need something else to help with the present symptoms. She is wanting a referral for counseling. She is struggling at times. She needs to talk to someone. She is very concerned about her mother's health. GERD:  Medication:   Omeprazole 40 mg daily  Symptoms:stable on omeprazole      Asthma:  Medication:   Singulair 10 mg daily   Ventolin HFA 2 puffs 4 times daily as needed   DuoNeb 0.5-2.5 mg neb solution 4 times daily as needed  Symptoms: well controlled at present. vitals were not taken for this visit.       There is no height or weight on file to calculate BMI. I have reviewed the following with the Ms. Orlando   Lab Review  Orders Only on 07/27/2022   Component Date Value    Microalbumin, Random Uri* 07/27/2022 6.10     Creatinine, Ur 07/27/2022 275.6     Microalbumin Creatinine * 07/27/2022 22.1     T4 Free 07/27/2022 1.04     TSH 07/27/2022 1.780     Hemoglobin A1C 07/27/2022 5.2     Sodium 07/27/2022 142     Potassium 07/27/2022 3.6     Chloride 07/27/2022 107     CO2 07/27/2022 21 (A)     Anion Gap 07/27/2022 14     Glucose 07/27/2022 115 (A)     BUN 07/27/2022 5 (A)     Creatinine 07/27/2022 0.7     GFR Non- 07/27/2022 >60     GFR  07/27/2022 >59     Calcium 07/27/2022 9.8     Total Protein 07/27/2022 6.8     Albumin 07/27/2022 4.2     Total Bilirubin 07/27/2022 0.5     Alkaline Phosphatase 07/27/2022 26 (A)     ALT 07/27/2022 12     AST 07/27/2022 16     WBC 07/27/2022 6.8     RBC 07/27/2022 4.38     Hemoglobin 07/27/2022 13.1     Hematocrit 07/27/2022 40.6     MCV 07/27/2022 92.7     MCH 07/27/2022 29.9     MCHC 07/27/2022 32.3 (A)     RDW 07/27/2022 12.9     Platelets 47/63/2525 339     MPV 07/27/2022 10.4     Neutrophils % 07/27/2022 61.1     Lymphocytes % 07/27/2022 31.9     Monocytes % 07/27/2022 5.1     Eosinophils % 07/27/2022 1.5     Basophils % 07/27/2022 0.3     Neutrophils Absolute 07/27/2022 4.2     Immature Granulocytes # 07/27/2022 0.0     Lymphocytes Absolute 07/27/2022 2.2     Monocytes Absolute 07/27/2022 0.40     Eosinophils Absolute 07/27/2022 0.10     Basophils Absolute 07/27/2022 0.00     Cholesterol, Fasting 07/27/2022 176     Triglyceride, Fasting 07/27/2022 151 (A)     HDL 07/27/2022 40 (A)     LDL Calculated 07/27/2022 106     CRP 07/27/2022 <0.30     Sed Rate 07/27/2022 14     Anti ds DNA 07/27/2022 1.4     ROSE MARY Ab, IgG LYDIA 07/27/2022 None Detected      Copies of these are in the chart.     Current Outpatient Medications   Medication Sig Dispense Refill    hydrOXYzine HCl (ATARAX) 25 MG tablet Take 1 tablet by mouth every 6 hours as needed for Itching 90 tablet 5    brexpiprazole (REXULTI) 0.5 MG TABS tablet Take 1 tablet by mouth daily 30 tablet 5    albuterol sulfate HFA (VENTOLIN HFA) 108 (90 Base) MCG/ACT inhaler Inhale 2 puffs into the lungs 4 times daily 18 g 5    ibuprofen (ADVIL;MOTRIN) 800 MG tablet TAKE ONE TABLET BY MOUTH EVERY 8 HOURS AS NEEDED FOR PAIN WITH FOOD OR MILK 120 tablet 3    amLODIPine (NORVASC) 5 MG tablet Take 1 tablet by mouth daily 180 tablet 0    ipratropium-albuterol (DUONEB) 0.5-2.5 (3) MG/3ML SOLN nebulizer solution INHALE ONE VIAL INTO THE LUNGS IN THE MORNING, AT NOON, IN THE EVENING, AND AT BEDTIME 360 mL 1    naproxen (NAPROSYN) 500 MG tablet TAKE ONE TABLET BY MOUTH TWICE DAILY 60 tablet 3    promethazine-dextromethorphan (PROMETHAZINE-DM) 6.25-15 MG/5ML syrup TAKE 5ml BY MOUTH FOUR TIMES DAILY AS NEEDED FOR cough 180 mL 0    fluticasone (FLONASE) 50 MCG/ACT nasal spray 2 sprays by Nasal route daily 48 g 1    venlafaxine (EFFEXOR XR) 75 MG extended release capsule Take 1 capsule by mouth daily 30 capsule 3    venlafaxine (EFFEXOR XR) 150 MG extended release capsule Take 1 capsule by mouth daily 90 capsule 2    norgestimate-ethinyl estradiol (ORTHO-CYCLEN) 0.25-35 MG-MCG per tablet Take 1 tablet by mouth daily 84 tablet 3    amitriptyline (ELAVIL) 25 MG tablet TAKE ONE TABLET BY MOUTH AT BEDTIME 30 tablet 5    piroxicam (FELDENE) 20 MG capsule TAKE ONE CAPSULE BY MOUTH DAILY 30 capsule 3    omeprazole (PRILOSEC) 40 MG delayed release capsule Take 1 capsule by mouth every morning (before breakfast) 60 capsule 5    montelukast (SINGULAIR) 10 MG tablet TAKE ONE TABLET BY MOUTH DAILY 90 tablet 3    atorvastatin (LIPITOR) 40 MG tablet TAKE ONE TABLET BY MOUTH DAILY 90 tablet 3    busPIRone (BUSPAR) 5 MG tablet Take 1 tablet by mouth 2 times daily 180 tablet 3    metoprolol succinate (TOPROL XL) 50 MG extended release tablet Take 1 tablet by mouth daily 30 tablet 11 lamoTRIgine (LAMICTAL) 200 MG tablet Take 1 tablet by mouth 2 times daily 60 tablet 11    Multiple Vitamin (MULTI-DAY VITAMINS PO) Take 1 tablet by mouth daily. No current facility-administered medications for this visit. Allergies: Eggs or egg-derived products, Bactrim [sulfamethoxazole-trimethoprim], Elemental sulfur, Other, Pcn [penicillins], and Sulfa antibiotics     Past Medical History:   Diagnosis Date    Asthma     History of Chiari malformation     Seizures (HCC)     VSD (ventricular septal defect)        Family History   Problem Relation Age of Onset    Asthma Mother     Lupus Mother     High Blood Pressure Mother     Asthma Maternal Grandmother     Asthma Maternal Grandfather     Cancer Maternal Grandfather     Stroke Maternal Grandfather     Diabetes Maternal Grandfather     Asthma Paternal Grandmother     Stroke Paternal Grandmother     Diabetes Paternal Grandmother     Asthma Paternal Grandfather        Past Surgical History:   Procedure Laterality Date    ADENOIDECTOMY      OTHER SURGICAL HISTORY      chiari decompression    TONSILLECTOMY      TYMPANOSTOMY TUBE PLACEMENT      VENTRICULOPERITONEAL SHUNT         Social History     Tobacco Use    Smoking status: Never    Smokeless tobacco: Never   Substance Use Topics    Alcohol use: No        Review of Systems   HENT: Negative. Eyes: Negative. Respiratory: Negative. Cardiovascular: Negative. Gastrointestinal: Negative. Endocrine: Negative. Genitourinary: Negative. Musculoskeletal:  Positive for arthralgias (right foot pain). Skin: Negative. Allergic/Immunologic: Negative. Neurological: Negative. Hematological: Negative. Psychiatric/Behavioral:  Positive for dysphoric mood. The patient is nervous/anxious. Physical Exam  Physical exam was not performed today as this was a video teleconference visit using [employer]      ASSESSMENT      ICD-10-CM    1. Primary hypertension  I10       2.  Anxiety and depression  F41.9 External Referral To Psychiatry    F32. A hydrOXYzine HCl (ATARAX) 25 MG tablet     brexpiprazole (REXULTI) 0.5 MG TABS tablet      3. Gastroesophageal reflux disease, unspecified whether esophagitis present  K21.9       4. Moderate persistent asthma without complication  C01.69       5. Right foot pain  M79.671 XR FOOT RIGHT (MIN 3 VIEWS)      6. Other insomnia  G47.09 hydrOXYzine HCl (ATARAX) 25 MG tablet            PLAN    1. Primary hypertension  Blood pressure is appropriately controlled on present medication regimen by self-report. She is not checking regularly but when she does check her blood pressure is controlled    2. Anxiety and depression  She is doing exceptionally well at present. She does feel she needs something in addition to her present dose of Effexor. We are going to add Rexulti to her medication regimen. We will start with 0.5 mg daily at bedtime and then increase as needed. We will also give her a refill of her hydroxyzine that she can take with acute anxiety exacerbations. This has been effective in the past.  We will also initiate therapy with counseling utilizing [facility] in [place]  - External Referral To Psychology  - hydrOXYzine HCl (ATARAX) 25 MG tablet; Take 1 tablet by mouth every 6 hours as needed for Itching  Dispense: 90 tablet; Refill: 5  - brexpiprazole (REXULTI) 0.5 MG TABS tablet; Take 1 tablet by mouth daily  Dispense: 30 tablet; Refill: 5    3. Gastroesophageal reflux disease, unspecified whether esophagitis present  Doing well on omeprazole. Continue the same    4. Moderate persistent asthma without complication  Stable now on present medication regimen    5. Right foot pain  No specific injury is noted however this has been going on for over a week. She also has significant pain that is limiting her ability to ambulate. We will check x-rays to ensure no occult fractures  - XR FOOT RIGHT (MIN 3 VIEWS); Future    6.  Other insomnia  Hydroxyzine can also be used to help her sleep at night  - hydrOXYzine HCl (ATARAX) 25 MG tablet; Take 1 tablet by mouth every 6 hours as needed for Itching  Dispense: 90 tablet; Refill: 5    Orders Placed This Encounter   Procedures    XR FOOT RIGHT (MIN 3 VIEWS)    External Referral To Psychiatry        Return in about 1 month (around 12/30/2022) for Delta Keating, was evaluated through a synchronous (real-time) audio-video encounter. The patient (or guardian if applicable) is aware that this is a billable service, which includes applicable co-pays. This Virtual Visit was conducted with patient's (and/or legal guardian's) consent. The visit was conducted pursuant to the emergency declaration under the [address] [place] [address], [address] [place] [address] authority and the [employer] and [employer] Act. Patient identification was verified, and a caregiver was present when appropriate. The patient was located at Home: [address] [place] [address]. Total time spent for this encounter:  30m    --DANIELA Bermudez DO on 11/30/2022 at 9:26 AM    An electronic signature was used to authenticate this note.

## 2022-12-06 DIAGNOSIS — F32.A ANXIETY AND DEPRESSION: ICD-10-CM

## 2022-12-06 DIAGNOSIS — F41.9 ANXIETY AND DEPRESSION: ICD-10-CM

## 2022-12-06 DIAGNOSIS — R00.0 TACHYCARDIA: ICD-10-CM

## 2022-12-06 DIAGNOSIS — I10 PRIMARY HYPERTENSION: ICD-10-CM

## 2022-12-06 DIAGNOSIS — R00.2 PALPITATIONS: ICD-10-CM

## 2022-12-06 DIAGNOSIS — G47.09 OTHER INSOMNIA: ICD-10-CM

## 2022-12-06 RX ORDER — LAMOTRIGINE 200 MG/1
200 TABLET ORAL 2 TIMES DAILY
Qty: 60 TABLET | Refills: 11 | Status: SHIPPED | OUTPATIENT
Start: 2022-12-06

## 2022-12-06 RX ORDER — METOPROLOL SUCCINATE 50 MG/1
50 TABLET, EXTENDED RELEASE ORAL DAILY
Qty: 30 TABLET | Refills: 11 | Status: SHIPPED | OUTPATIENT
Start: 2022-12-06

## 2022-12-06 RX ORDER — HYDROXYZINE HYDROCHLORIDE 25 MG/1
25 TABLET, FILM COATED ORAL EVERY 6 HOURS PRN
Qty: 90 TABLET | Refills: 5 | Status: SHIPPED | OUTPATIENT
Start: 2022-12-06

## 2022-12-06 NOTE — TELEPHONE ENCOUNTER
Received fax from pharmacy requesting refill on pts medication(s). Pt was last seen in office on 11/30/2022  and has a follow up scheduled for 12/30/2022. Will send request to  Dr. Hugh Celis  for authorization.      Requested Prescriptions     Pending Prescriptions Disp Refills    hydrOXYzine HCl (ATARAX) 25 MG tablet [Pharmacy Med Name: hydroxyzine HCl 25 mg tablet] 90 tablet 5     Sig: TAKE ONE TABLET BY MOUTH EVERY 6 HOURS AS NEEDED FOR ITCHING    lamoTRIgine (LAMICTAL) 200 MG tablet [Pharmacy Med Name: lamotrigine 200 mg tablet] 60 tablet 11     Sig: TAKE ONE TABLET BY MOUTH TWICE DAILY

## 2022-12-06 NOTE — TELEPHONE ENCOUNTER
Received fax from pharmacy requesting refill on pts medication(s). Pt was last seen in office on 11/30/2022  and has a follow up scheduled for 12/30/2022. Will send request to  Dr. Megha Devine  for authorization.      Requested Prescriptions     Pending Prescriptions Disp Refills    metoprolol succinate (TOPROL XL) 50 MG extended release tablet [Pharmacy Med Name: metoprolol succinate ER 50 mg tablet,extended release 24 hr] 30 tablet 11     Sig: TAKE ONE TABLET BY MOUTH IN THE MORNING

## 2022-12-08 DIAGNOSIS — F51.01 PRIMARY INSOMNIA: ICD-10-CM

## 2022-12-08 RX ORDER — AMITRIPTYLINE HYDROCHLORIDE 25 MG/1
25 TABLET, FILM COATED ORAL NIGHTLY
Qty: 30 TABLET | Refills: 5 | Status: SHIPPED | OUTPATIENT
Start: 2022-12-08

## 2022-12-08 NOTE — TELEPHONE ENCOUNTER
Received fax from pharmacy requesting refill on pts medication(s). Pt was last seen in office on 11/30/2022  and has a follow up scheduled for 12/30/2022. Will send request to  Dr. Simone Salas  for authorization.      Requested Prescriptions     Pending Prescriptions Disp Refills    amitriptyline (ELAVIL) 25 MG tablet 30 tablet 5     Sig: Take 1 tablet by mouth nightly

## 2022-12-09 ENCOUNTER — HOSPITAL ENCOUNTER (OUTPATIENT)
Dept: GENERAL RADIOLOGY | Age: 22
Discharge: HOME | End: 2022-12-09
Payer: MEDICAID

## 2022-12-09 DIAGNOSIS — M79.671 RIGHT FOOT PAIN: ICD-10-CM

## 2022-12-09 PROCEDURE — 73630 X-RAY EXAM OF FOOT: CPT

## 2022-12-12 ENCOUNTER — TELEPHONE (OUTPATIENT)
Dept: PRIMARY CARE CLINIC | Age: 22
End: 2022-12-12

## 2022-12-12 NOTE — TELEPHONE ENCOUNTER
----- Message from EVELYN Villegas sent at 12/12/2022 12:35 PM CST -----  Xray right foot normal  Cont supportive care

## 2022-12-20 DIAGNOSIS — J01.01 ACUTE RECURRENT MAXILLARY SINUSITIS: ICD-10-CM

## 2022-12-21 DIAGNOSIS — J45.31 MILD PERSISTENT ASTHMA WITH ACUTE EXACERBATION: ICD-10-CM

## 2022-12-21 RX ORDER — DEXTROMETHORPHAN HYDROBROMIDE AND PROMETHAZINE HYDROCHLORIDE 15; 6.25 MG/5ML; MG/5ML
5 SYRUP ORAL 4 TIMES DAILY PRN
Qty: 180 ML | Refills: 0 | Status: SHIPPED | OUTPATIENT
Start: 2022-12-21

## 2022-12-21 RX ORDER — IPRATROPIUM BROMIDE AND ALBUTEROL SULFATE 2.5; .5 MG/3ML; MG/3ML
1 SOLUTION RESPIRATORY (INHALATION) EVERY 6 HOURS
Qty: 360 ML | Refills: 1 | Status: SHIPPED | OUTPATIENT
Start: 2022-12-21

## 2022-12-21 NOTE — TELEPHONE ENCOUNTER
Received fax from pharmacy requesting refill on pts medication(s). Pt was last seen in office on 11/30/2022  and has a follow up scheduled for 12/30/2022. Will send request to  Dr. Clarisa Thomas  for authorization.      Requested Prescriptions     Pending Prescriptions Disp Refills    promethazine-dextromethorphan (PROMETHAZINE-DM) 6.25-15 MG/5ML syrup 180 mL 0

## 2022-12-21 NOTE — TELEPHONE ENCOUNTER
Received fax from pharmacy requesting refill on pts medication(s). Pt was last seen in office on 11/30/2022  and has a follow up scheduled for 12/30/2022. Will send request to  Ashkan Fitzgerald  for authorization.      Requested Prescriptions     Pending Prescriptions Disp Refills    ipratropium-albuterol (DUONEB) 0.5-2.5 (3) MG/3ML SOLN nebulizer solution [Pharmacy Med Name: ipratropium 0.5 mg-albuterol 3 mg (2.5 mg base)/3 mL nebulization soln] 360 mL 1     Sig: INHALE ONE VIAL INTO THE LUNGS IN THE MORNING, AT NOON, IN THE EVENING, AND AT BEDTIME

## 2022-12-30 ENCOUNTER — TELEMEDICINE (OUTPATIENT)
Dept: PRIMARY CARE CLINIC | Age: 22
End: 2022-12-30
Payer: MEDICAID

## 2022-12-30 DIAGNOSIS — G56.01 CARPAL TUNNEL SYNDROME OF RIGHT WRIST: ICD-10-CM

## 2022-12-30 DIAGNOSIS — F32.A ANXIETY AND DEPRESSION: ICD-10-CM

## 2022-12-30 DIAGNOSIS — F41.9 ANXIETY AND DEPRESSION: ICD-10-CM

## 2022-12-30 DIAGNOSIS — J45.20 MILD INTERMITTENT ASTHMA WITHOUT COMPLICATION: ICD-10-CM

## 2022-12-30 DIAGNOSIS — S46.911A STRAIN OF RIGHT SHOULDER, INITIAL ENCOUNTER: Primary | ICD-10-CM

## 2022-12-30 DIAGNOSIS — I10 PRIMARY HYPERTENSION: ICD-10-CM

## 2022-12-30 PROCEDURE — 99214 OFFICE O/P EST MOD 30 MIN: CPT | Performed by: PEDIATRICS

## 2022-12-30 RX ORDER — PREDNISONE 10 MG/1
TABLET ORAL
Qty: 43 TABLET | Refills: 0 | Status: SHIPPED | OUTPATIENT
Start: 2022-12-30

## 2022-12-30 ASSESSMENT — ENCOUNTER SYMPTOMS
EYES NEGATIVE: 1
RESPIRATORY NEGATIVE: 1
GASTROINTESTINAL NEGATIVE: 1
ALLERGIC/IMMUNOLOGIC NEGATIVE: 1

## 2022-12-30 NOTE — PROGRESS NOTES
17[facility], [address]  Phone [phone]   Fax [phone]      OFFICE VISIT: 2022    Alisson Orlando-: 2000      [facility]  Reason For Visit:  Alisson Gonzalez is a 25 y.o. Shoulder Injury    Presents via [employer] video conferencing visit on follow-up for multiple health issues. Present concerns:    She has been trying to clean the house to make room for her mom's hospital bed. She is taking Naproxen 500mg bid and occasionally she will take Ibuprofen 800mg  She typically does well with prednisone. Hypertension:   BP today was   BP Readings from Last 1 Encounters:   22 118/82      Recent BP readings:    BP Readings from Last 3 Encounters:   22 118/82   22 124/84   20 134/88     Medication              Amlodipine 5 mg daily              Toprol-XL 50 mg daily   Medication compliance:  compliant most of the time  Home blood pressure monitoring: Yes - and excellently controlled. She is adherent to a low sodium diet. Symptoms: none  Laboratory:  Lab Results   Component Value Date    BUN 5 (L) 2022    CREATININE 0.7 2022       Anxiety and depression:  Medication:              Effexor  mg daily (150 mg +75 mg)  BuSpar 5 mg twice daily as needed  Symptoms:doing ok on this regimen. She feels like she may need something else to help with the present symptoms. GERD:  Medication:              Omeprazole 40 mg daily  Symptoms:stable on omeprazole        Asthma:  Medication:              Singulair 10 mg daily              Ventolin HFA 2 puffs 4 times daily as needed              DuoNeb 0.5-2.5 mg neb solution 4 times daily as needed  Symptoms: well controlled at present. vitals were not taken for this visit. There is no height or weight on file to calculate BMI. I have reviewed the following with the Ms. Orlando   Lab Review  Orders Only on 2022   Component Date Value    Microalbumin, Random Frutoso Gouty* 07/27/2022 6.10     Creatinine, Ur 07/27/2022 275.6     Microalbumin Creatinine * 07/27/2022 22.1     T4 Free 07/27/2022 1.04     TSH 07/27/2022 1.780     Hemoglobin A1C 07/27/2022 5.2     Sodium 07/27/2022 142     Potassium 07/27/2022 3.6     Chloride 07/27/2022 107     CO2 07/27/2022 21 (A)     Anion Gap 07/27/2022 14     Glucose 07/27/2022 115 (A)     BUN 07/27/2022 5 (A)     Creatinine 07/27/2022 0.7     GFR Non- 07/27/2022 >60     GFR  07/27/2022 >59     Calcium 07/27/2022 9.8     Total Protein 07/27/2022 6.8     Albumin 07/27/2022 4.2     Total Bilirubin 07/27/2022 0.5     Alkaline Phosphatase 07/27/2022 26 (A)     ALT 07/27/2022 12     AST 07/27/2022 16     WBC 07/27/2022 6.8     RBC 07/27/2022 4.38     Hemoglobin 07/27/2022 13.1     Hematocrit 07/27/2022 40.6     MCV 07/27/2022 92.7     MCH 07/27/2022 29.9     MCHC 07/27/2022 32.3 (A)     RDW 07/27/2022 12.9     Platelets 08/12/3088 339     MPV 07/27/2022 10.4     Neutrophils % 07/27/2022 61.1     Lymphocytes % 07/27/2022 31.9     Monocytes % 07/27/2022 5.1     Eosinophils % 07/27/2022 1.5     Basophils % 07/27/2022 0.3     Neutrophils Absolute 07/27/2022 4.2     Immature Granulocytes # 07/27/2022 0.0     Lymphocytes Absolute 07/27/2022 2.2     Monocytes Absolute 07/27/2022 0.40     Eosinophils Absolute 07/27/2022 0.10     Basophils Absolute 07/27/2022 0.00     Cholesterol, Fasting 07/27/2022 176     Triglyceride, Fasting 07/27/2022 151 (A)     HDL 07/27/2022 40 (A)     LDL Calculated 07/27/2022 106     CRP 07/27/2022 <0.30     Sed Rate 07/27/2022 14     Anti ds DNA 07/27/2022 1.4     ROSE MARY Ab, IgG LYDIA 07/27/2022 None Detected      Copies of these are in the chart.     Current Outpatient Medications   Medication Sig Dispense Refill    predniSONE (DELTASONE) 10 MG tablet Days 1,2,3 = 60 mg (6 pills), Days 4,5 = 50 mg, Days 6,7 = 40 mg, Day 8 = 30 mg, Day 9 = 20 mg, Day 10 = 10 mg, Day 11,12 = 5 mg (1/2 tab) 43 tablet 0 promethazine-dextromethorphan (PROMETHAZINE-DM) 6.25-15 MG/5ML syrup Take 5 mLs by mouth 4 times daily as needed for Cough 180 mL 0    ipratropium-albuterol (DUONEB) 0.5-2.5 (3) MG/3ML SOLN nebulizer solution Inhale 3 mLs into the lungs in the morning and 3 mLs at noon and 3 mLs in the evening and 3 mLs before bedtime.  360 mL 1    amitriptyline (ELAVIL) 25 MG tablet Take 1 tablet by mouth nightly 30 tablet 5    hydrOXYzine HCl (ATARAX) 25 MG tablet Take 1 tablet by mouth every 6 hours as needed for Itching 90 tablet 5    lamoTRIgine (LAMICTAL) 200 MG tablet Take 1 tablet by mouth 2 times daily 60 tablet 11    metoprolol succinate (TOPROL XL) 50 MG extended release tablet Take 1 tablet by mouth daily 30 tablet 11    brexpiprazole (REXULTI) 0.5 MG TABS tablet Take 1 tablet by mouth daily 30 tablet 5    albuterol sulfate HFA (VENTOLIN HFA) 108 (90 Base) MCG/ACT inhaler Inhale 2 puffs into the lungs 4 times daily 18 g 5    ibuprofen (ADVIL;MOTRIN) 800 MG tablet TAKE ONE TABLET BY MOUTH EVERY 8 HOURS AS NEEDED FOR PAIN WITH FOOD OR MILK 120 tablet 3    amLODIPine (NORVASC) 5 MG tablet Take 1 tablet by mouth daily 180 tablet 0    naproxen (NAPROSYN) 500 MG tablet TAKE ONE TABLET BY MOUTH TWICE DAILY 60 tablet 3    fluticasone (FLONASE) 50 MCG/ACT nasal spray 2 sprays by Nasal route daily 48 g 1    venlafaxine (EFFEXOR XR) 75 MG extended release capsule Take 1 capsule by mouth daily 30 capsule 3    venlafaxine (EFFEXOR XR) 150 MG extended release capsule Take 1 capsule by mouth daily 90 capsule 2    norgestimate-ethinyl estradiol (ORTHO-CYCLEN) 0.25-35 MG-MCG per tablet Take 1 tablet by mouth daily 84 tablet 3    piroxicam (FELDENE) 20 MG capsule TAKE ONE CAPSULE BY MOUTH DAILY 30 capsule 3    omeprazole (PRILOSEC) 40 MG delayed release capsule Take 1 capsule by mouth every morning (before breakfast) 60 capsule 5    montelukast (SINGULAIR) 10 MG tablet TAKE ONE TABLET BY MOUTH DAILY 90 tablet 3    atorvastatin (LIPITOR) 40 MG tablet TAKE ONE TABLET BY MOUTH DAILY 90 tablet 3    busPIRone (BUSPAR) 5 MG tablet Take 1 tablet by mouth 2 times daily 180 tablet 3    Multiple Vitamin (MULTI-DAY VITAMINS PO) Take 1 tablet by mouth daily. No current facility-administered medications for this visit. Allergies: Eggs or egg-derived products, Bactrim [sulfamethoxazole-trimethoprim], Elemental sulfur, Other, Pcn [penicillins], and Sulfa antibiotics     Past Medical History:   Diagnosis Date    Asthma     History of Chiari malformation     Seizures (HCC)     VSD (ventricular septal defect)        Family History   Problem Relation Age of Onset    Asthma Mother     Lupus Mother     High Blood Pressure Mother     Asthma Maternal Grandmother     Asthma Maternal Grandfather     Cancer Maternal Grandfather     Stroke Maternal Grandfather     Diabetes Maternal Grandfather     Asthma Paternal Grandmother     Stroke Paternal Grandmother     Diabetes Paternal Grandmother     Asthma Paternal Grandfather        Past Surgical History:   Procedure Laterality Date    ADENOIDECTOMY      OTHER SURGICAL HISTORY      chiari decompression    TONSILLECTOMY      TYMPANOSTOMY TUBE PLACEMENT      VENTRICULOPERITONEAL SHUNT         Social History     Tobacco Use    Smoking status: Never    Smokeless tobacco: Never   Substance Use Topics    Alcohol use: No        Review of Systems   HENT: Negative. Eyes: Negative. Respiratory: Negative. Cardiovascular: Negative. Gastrointestinal: Negative. Endocrine: Negative. Genitourinary: Negative. Musculoskeletal:  Positive for arthralgias (right shoulder). Skin: Negative. Allergic/Immunologic: Negative. Neurological: Negative. Hematological: Negative. Psychiatric/Behavioral:  Positive for dysphoric mood. The patient is nervous/anxious.       Physical Exam  PE was not done today as this was a video conference visit        ASSESSMENT      ICD-10-CM    1. Strain of right shoulder, initial encounter  S46.911A predniSONE (DELTASONE) 10 MG tablet      2. Carpal tunnel syndrome of right wrist  G56.01 predniSONE (DELTASONE) 10 MG tablet      3. Primary hypertension  I10       4. Anxiety and depression  F41.9     F32. A       5. Mild intermittent asthma without complication  E61.65             PLAN    1. Strain of right shoulder, initial encounter  We will try to calm down her muscle strain symptoms with a round of prednisone. She is aware that she should not take nonsteroidal anti-inflammatories in conjunction with prednisone. She can resume her  Steroidal anti-inflammatory medications following completion of the prednisone taper.  - predniSONE (DELTASONE) 10 MG tablet; Days 1,2,3 = 60 mg (6 pills), Days 4,5 = 50 mg, Days 6,7 = 40 mg, Day 8 = 30 mg, Day 9 = 20 mg, Day 10 = 10 mg, Day 11,12 = 5 mg (1/2 tab)  Dispense: 43 tablet; Refill: 0    2. Carpal tunnel syndrome of right wrist  Hopefully the prednisone taper will help with her carpal tunnel as well. This however is likely to come back  - predniSONE (DELTASONE) 10 MG tablet; Days 1,2,3 = 60 mg (6 pills), Days 4,5 = 50 mg, Days 6,7 = 40 mg, Day 8 = 30 mg, Day 9 = 20 mg, Day 10 = 10 mg, Day 11,12 = 5 mg (1/2 tab)  Dispense: 43 tablet; Refill: 0    3. Primary hypertension  Blood pressure is excellently controlled on present medication regimen. She is monitoring from an ambulatory standpoint. 4. Anxiety and depression  She is doing very well from an anxiety and depression standpoint. Continue present medication regimen    5. Mild intermittent asthma without complication  Asthma is very well controlled as are triggers    No orders of the defined types were placed in this encounter. Return in about 3 months (around 3/30/2023) for . [facility], was evaluated through a synchronous (real-time) audio-video encounter. The patient (or guardian if applicable) is aware that this is a billable service, which includes applicable co-pays. This Virtual Visit was conducted with patient's (and/or legal guardian's) consent. The visit was conducted pursuant to the emergency declaration under the [address] [place] [address] and the [employer] and [employer] Act. Patient identification was verified, and a caregiver was present when appropriate. The patient was located at Home: [address] [place] [address]. Total time spent for this encounter:  30m    --DANIELA Acosta DO on 12/30/2022 at 12:32 PM    An electronic signature was used to authenticate this note.

## 2023-01-03 DIAGNOSIS — F41.9 ANXIETY AND DEPRESSION: ICD-10-CM

## 2023-01-03 DIAGNOSIS — F32.A ANXIETY AND DEPRESSION: ICD-10-CM

## 2023-01-03 RX ORDER — BUSPIRONE HYDROCHLORIDE 5 MG/1
5 TABLET ORAL 2 TIMES DAILY
Qty: 180 TABLET | Refills: 3 | Status: SHIPPED | OUTPATIENT
Start: 2023-01-03

## 2023-01-03 RX ORDER — VENLAFAXINE HYDROCHLORIDE 75 MG/1
75 CAPSULE, EXTENDED RELEASE ORAL DAILY
Qty: 30 CAPSULE | Refills: 3 | Status: SHIPPED | OUTPATIENT
Start: 2023-01-03

## 2023-01-03 NOTE — TELEPHONE ENCOUNTER
Received fax from pharmacy requesting refill on pts medication(s). Pt was last seen in office on 12/30/2022  and has a follow up scheduled for 3/30/2023. Will send request to  Dr. Lynnette Augustin  for authorization.      Requested Prescriptions     Pending Prescriptions Disp Refills    venlafaxine (EFFEXOR XR) 75 MG extended release capsule [Pharmacy Med Name: venlafaxine ER 75 mg capsule,extended release 24 hr] 30 capsule 3     Sig: TAKE ONE CAPSULE BY MOUTH DAILY    busPIRone (BUSPAR) 5 MG tablet [Pharmacy Med Name: buspirone 5 mg tablet] 180 tablet 3     Sig: TAKE ONE TABLET BY MOUTH TWICE DAILY

## 2023-01-12 ENCOUNTER — PATIENT MESSAGE (OUTPATIENT)
Dept: PRIMARY CARE CLINIC | Age: 23
End: 2023-01-12

## 2023-01-12 DIAGNOSIS — E78.2 MIXED HYPERLIPIDEMIA: ICD-10-CM

## 2023-01-12 DIAGNOSIS — E66.01 CLASS 3 SEVERE OBESITY DUE TO EXCESS CALORIES WITH BODY MASS INDEX (BMI) OF 45.0 TO 49.9 IN ADULT, UNSPECIFIED WHETHER SERIOUS COMORBIDITY PRESENT (HCC): ICD-10-CM

## 2023-01-12 DIAGNOSIS — I10 PRIMARY HYPERTENSION: Primary | ICD-10-CM

## 2023-01-12 DIAGNOSIS — F41.9 ANXIETY: ICD-10-CM

## 2023-01-12 NOTE — TELEPHONE ENCOUNTER
From: Glenn Martinez  To: Dr. Grace White: 1/12/2023 11:48 AM CST  Subject: Lab work     I am due again for my lab work. All of it.    I can get it done tomorrow after moms appointment

## 2023-01-13 DIAGNOSIS — E78.2 MIXED HYPERLIPIDEMIA: ICD-10-CM

## 2023-01-13 DIAGNOSIS — F41.9 ANXIETY: ICD-10-CM

## 2023-01-13 DIAGNOSIS — E66.01 CLASS 3 SEVERE OBESITY DUE TO EXCESS CALORIES WITH BODY MASS INDEX (BMI) OF 45.0 TO 49.9 IN ADULT, UNSPECIFIED WHETHER SERIOUS COMORBIDITY PRESENT (HCC): ICD-10-CM

## 2023-01-13 DIAGNOSIS — I10 PRIMARY HYPERTENSION: ICD-10-CM

## 2023-01-13 LAB
ALBUMIN SERPL-MCNC: 4.4 G/DL (ref 3.5–5.2)
ALP BLD-CCNC: 23 U/L (ref 35–104)
ALT SERPL-CCNC: 14 U/L (ref 5–33)
ANION GAP SERPL CALCULATED.3IONS-SCNC: 15 MMOL/L (ref 7–19)
AST SERPL-CCNC: 18 U/L (ref 5–32)
BASOPHILS ABSOLUTE: 0 K/UL (ref 0–0.2)
BASOPHILS RELATIVE PERCENT: 0.5 % (ref 0–1)
BILIRUB SERPL-MCNC: 0.8 MG/DL (ref 0.2–1.2)
BUN BLDV-MCNC: 14 MG/DL (ref 6–20)
CALCIUM SERPL-MCNC: 9.7 MG/DL (ref 8.6–10)
CHLORIDE BLD-SCNC: 105 MMOL/L (ref 98–111)
CHOLESTEROL, FASTING: 218 MG/DL (ref 160–199)
CO2: 19 MMOL/L (ref 22–29)
CREAT SERPL-MCNC: 0.6 MG/DL (ref 0.5–0.9)
CREATININE URINE: 228.5 MG/DL (ref 4.2–622)
EOSINOPHILS ABSOLUTE: 0.1 K/UL (ref 0–0.6)
EOSINOPHILS RELATIVE PERCENT: 1.3 % (ref 0–5)
GFR SERPL CREATININE-BSD FRML MDRD: >60 ML/MIN/{1.73_M2}
GLUCOSE BLD-MCNC: 101 MG/DL (ref 74–109)
HCT VFR BLD CALC: 40.9 % (ref 37–47)
HDLC SERPL-MCNC: 50 MG/DL (ref 65–121)
HEMOGLOBIN: 13.3 G/DL (ref 12–16)
IMMATURE GRANULOCYTES #: 0 K/UL
LDL CHOLESTEROL CALCULATED: 148 MG/DL
LYMPHOCYTES ABSOLUTE: 2.1 K/UL (ref 1.1–4.5)
LYMPHOCYTES RELATIVE PERCENT: 33.5 % (ref 20–40)
MCH RBC QN AUTO: 30.1 PG (ref 27–31)
MCHC RBC AUTO-ENTMCNC: 32.5 G/DL (ref 33–37)
MCV RBC AUTO: 92.5 FL (ref 81–99)
MICROALBUMIN UR-MCNC: <1.2 MG/DL (ref 0–19)
MICROALBUMIN/CREAT UR-RTO: NORMAL MG/G
MONOCYTES ABSOLUTE: 0.4 K/UL (ref 0–0.9)
MONOCYTES RELATIVE PERCENT: 6.6 % (ref 0–10)
NEUTROPHILS ABSOLUTE: 3.7 K/UL (ref 1.5–7.5)
NEUTROPHILS RELATIVE PERCENT: 57.8 % (ref 50–65)
PDW BLD-RTO: 13.4 % (ref 11.5–14.5)
PLATELET # BLD: 366 K/UL (ref 130–400)
PMV BLD AUTO: 9.8 FL (ref 9.4–12.3)
POTASSIUM SERPL-SCNC: 4 MMOL/L (ref 3.5–5)
RBC # BLD: 4.42 M/UL (ref 4.2–5.4)
SODIUM BLD-SCNC: 139 MMOL/L (ref 136–145)
T4 FREE: 1.18 NG/DL (ref 0.93–1.7)
TOTAL PROTEIN: 7.2 G/DL (ref 6.6–8.7)
TRIGLYCERIDE, FASTING: 100 MG/DL (ref 0–149)
TSH SERPL DL<=0.05 MIU/L-ACNC: 1.14 UIU/ML (ref 0.27–4.2)
WBC # BLD: 6.4 K/UL (ref 4.8–10.8)

## 2023-01-16 ENCOUNTER — TELEPHONE (OUTPATIENT)
Dept: PRIMARY CARE CLINIC | Age: 23
End: 2023-01-16

## 2023-01-16 NOTE — TELEPHONE ENCOUNTER
Called patient, spoke with: Patient regarding the results of the patients most recent Labs. I advised Patient of Dr. David Stanford recommendations.    Patient did voice understanding

## 2023-01-16 NOTE — TELEPHONE ENCOUNTER
----- Message from Anjel Shelby DO sent at 1/13/2023  5:34 PM CST -----  There is no significant protein excretion in the urine. Your metabolic profile is normal.  This includes kidney and liver functions as well as electrolytes. Cholesterol is significantly elevated. It does not appear that you are taking Lipitor on a routine basis. If you have not been taking this, I recommend you restarting the medication. 40 mg nightly    Thyroid values are normal.  Your WBC, (infection fighting ability) Hgb and Hct, (oxygen carrying cells) are normal; as is your percentage of each cell type.

## 2023-01-18 ENCOUNTER — HOSPITAL ENCOUNTER (OUTPATIENT)
Dept: PREADMISSION TESTING | Age: 23
Discharge: HOME | End: 2023-01-22

## 2023-01-18 VITALS — BODY MASS INDEX: 44.41 KG/M2 | HEIGHT: 68 IN | WEIGHT: 293 LBS

## 2023-01-18 RX ORDER — CLINDAMYCIN PHOSPHATE 900 MG/50ML
900 INJECTION INTRAVENOUS ONCE
Status: CANCELLED | OUTPATIENT
Start: 2023-01-26

## 2023-01-18 NOTE — DISCHARGE INSTRUCTIONS
PREOPERATIVE GUIDELINES WHEN RECEIVING ANESTHESIA    Do not eat or drink anything after midnight, the night before your surgery. This is extremely important for your safety. Take a bath (or shower) the night before your surgery and you may brush your teeth the morning of your surgery. You will be scheduled to arrive at the hospital 2 hours before your surgery, or follow your surgeon's instructions. Dress comfortably. Wear loose clothing that will be easy to remove and comfortable for your trip home. You may wear eyeglasses or contacts but bring your cases with you as they must be remove before your surgery. Hearing aids and dentures will need to be removed before your surgery. Do not wear any jewelry, including body jewelry. All jewelry will need to be removed prior to your surgery. Do not wear fingernail polish or make-up. It is best not to bring any valuables with you. If you are to stay in the hospital overnight, bring your robe, slippers and personal toiletries that you may need. POSTOPERATIVE GUIDELINES AFTER RECEIVING ANESTHESIA    If you are to go home after your surgery, you will need a responsible adult to drive you home. You will not be able to take public transportation after your discharge from the Operative Care Unit unless you are accompanied by a        responsible adult. On returning home, be sure to follow your physician's orders regarding diet, activity and medications. Remember, surgery with general anesthesia or sedation may leave you sleepy, very tired and with a decreased appetite for 12 to 24 hours. If you develop any post-surgical complications or problems, call your surgeon or [facility] Emergency Department ([phone]). The day before surgery you will receive a phone call from the surgery nurse to let you know what time to arrive on the day of surgery.  This call will usually be between 2-4 PM. If you do not receive a phone call by 4 PM the day before your surgery please call [phone] and let them know you have not received an arrival time. If your surgery is on Monday, your call will be on the Friday before your Monday surgery. MEDICATION INSTRUCTIONS PRIOR TO YOUR SURGERY      The morning of surgery: You can take all your usual prescribed medications with a small sip of water. DO NOT TAKE ANY DIABETIC MEDICATIONS the morning of your surgery. DO NOT TAKE ANY SUPPLEMENTS or over the counter medications the morning of  surgery. [address] [place] [address] Surgery Patients-Revised 6-    Visitors for surgery patients are essential for the patient's emotional well-being and care       post operatively. 2.   Visitor Expectations and Limitations      3. One visitor allowed with patients in the preop/postop rooms. 4.  A second visitor may sit in the waiting area. 5.  No children under 13 allowed in the pre-post op areas unless they are the patient. 6.  Two people may be with an underage surgical/procedural patient in preop/postop        room. 7.  If you are admitted to the hospital post operatively, there are NO RESTRICTIONS on       the floor at this time. 8.  If you are admitted to ICU postoperatively, you may have one visitor in the room from        7A-7P. A second visitor may sit in the ICU waiting room. No overnight visitors in         ICU waiting room.

## 2023-01-19 LAB
EKG P AXIS: 56 DEGREES
EKG P-R INTERVAL: 158 MS
EKG Q-T INTERVAL: 324 MS
EKG QRS DURATION: 102 MS
EKG QTC CALCULATION (BAZETT): 381 MS
EKG T AXIS: 4 DEGREES

## 2023-01-20 NOTE — DISCHARGE INSTRUCTIONS
Lower Extremity Post-op Instructions  Dr. Stephen Dickerson        POST-OP CARE: Please follow these instructions closely! IMPORTANT PHONE NUMBERS:  For emergencies, please call 911  You may reach Dr. Margy Davila or his medical assistants at [phone], M-F 8:00am-5:00pm  After 5pm or on the weekends, please call the answering service which can be reached from the number above   Call immediately if you have any of the following symptoms:  Elevated temperature above 101.5 degrees for more than 48 hours after surgery  Persistent drainage from wound  Severe pain around surgical site  Calf pain    Weight Bearing:   __x___ Weight Bearing as tolerated (with or without crutches)   _____ Touch-Toe Weight-bearing    _____ Partial Weight Bearing  ___ % for ___ weeks (must use crutches)   _____ Non Weight Bearing for ____ weeks (must use crutches, wheelchair or                          knee walker)    Bathing:  If stated below, it is ok to remove your postop dressing and shower. DO NOT SOAK the incision in water. Pat the incision site dry after surgery  _x__ You may remove your dressing and shower on the 3rd day following surgery; if you shower before this, please cover your incision thoroughly  ___Keep your splint/dressing clean, dry, intact. Do not place foreign objects inside the splint/dressing. Dressings: Do NOT remove dressing/splint unless unless told to do so. SOME DRAINAGE IS NORMAL! If you have a splint or cast, do NOT get wet!!    DO NOT touch, remove, or apply ointment to the incision and/or steri strips  Steri strips may fall off on their own  Signs of infection that warrant a phone call to our clinical line:  Excessive drainage or redness  Red streaking coming away from the incision  Increased pain  Increased temperature above 101.5 degrees    DRIVING:  absolutely no driving while taking pain medication. This will be discussed at your first postop visit.     Incision: do NOT uncover unless you are told it is ok (see above). Black sutures will need to be removed 10-14 days after surgery. Elevate: Place 2 pillows under your ankle to get the incision area above the level of the heart to help in swelling (a recliner is not elevated!!!)    Ice: Ice your surgery site 5-6 times per day for 20 minutes at a time with dressing in place. You should wait at least 30 minutes before icing again to avoid ice irritation. It may be difficult at first to ice the surgery area due to the amount of dressing, but continue to be diligent with icing. Your dressings will be taken down at your first post-op appointment. Range of motion: For the knee- It is important to gain gain full extension (knee straight) as soon as possible following surgery. Ice to decrease swelling --> Knee fully straight --> Walk without a limp!!!  NO PILLOWS UNDER THE KNEE, ONLY under the ankle  For the foot/ankle- range of motion restrictions will be given to you at your first post-op appointment. Until that time, avoid any unnecessary range of motion. Physical therapy- Your physical therapy status will be discussed with you at your first  post-op appointment. **Achieving range of motion goals and decreasing swelling/inflammation are the primary focus for the first two (2) weeks following surgery. **    Medications: You will be discharged with the appropriate medications following your surgery. Fill these at the pharmacy and take them as directed on the label. Possible medications that will be prescribed are below. You may or may not receive all of these. Occasionally, additional medications may be given with specific instructions. Percocet/Norco (oxycodone/hydrocodone with tylenol) - Pain Medication, will cause drowsiness, possibly itchiness (this is NOT an allergy - use benadryl or an over the counter allergy medication such as Claritin or Zyrtec)     o Take 1-2 tablets every 4-6 hours. DO NOT EXCEED 4,000mg of Tylenol in 24 hours.   **DO NOT MIX WITH ALCOHOL, DRIVE WHILE TAKING, OR TAKE with extra TYLENOL**     *  After the 2nd day of surgery, begin weaning pain medication (less pills, increased timeframe)     *  ALL narcotics will cause constipation - take a stool softener frequently. If you become constipated consider taking Milk of Magnesia as directed on the bottle. Colace (Docusate) - stool softener, used for constipation    Zofran (Ondansetron) or Phenergan - Anti-nausea medication, will cause drowsiness    Aspirin 81 mg - all patients with lower extremity surgery should take one aspirin                            81 mg for prevention of blood clots for the first 2 weeks after surgery. DO NOT TAKE NSAID'S (ex. IBUPROFEN, MOTRIN, ALEVE, ETC) AFTER A BROKEN BONE HAS BEEN REPAIRED OR AFTER ACL SURGERY - THESE MEDICATIONS WILL SLOW THE HEALING PROCESS!!!    **If you are running low on pain medications, please notify us if you need a refill 24-48 hours prior to when you run out, so we can make arrangements to refill the prescription for you if we determine is necessary. **[place] law prohibits \"calling in\" narcotic medication - you will be required to come by the office to  an additional prescription.

## 2023-01-25 PROBLEM — M22.42 CHONDROMALACIA, PATELLA, LEFT: Status: ACTIVE | Noted: 2023-01-25

## 2023-01-25 NOTE — OP NOTE
Patient Name: Medardo Jaramillo  : 2000  MRN: [id number]      [address] of SURGERY: 2023    SURGEON: [address] [place] [address] Albert Rinaldi MD    ASSISTANT: NONE    PREOPERATIVE DIAGNOSIS: Chondromalacia patella, Left knee    POSTOPERATIVE DIAGNOSIS: Chondromalacia patella, Left knee    PROCEDURE PERFORMED: Left knee arthroscopic patellar chondroplasty    IMPLANTS: none    ANESTHESIA USED: LMA    OPERATIVE INDICATIONS: This patient is a 25 y.o. female presented to my clinic with complaints of progressive knee pain and mechanical symptoms that originally began after a fall at home over a year ago. An MRI was obtained which showed the above named diagnoses. Pain and mechanical symptoms were not relieved with conservative treatment consisting of anti-inflammatories, corticosteroid injections, physical therapy. Due to progressive pain and loss of function, surgical evaluation was discussed and the patient wished to proceed understanding risks, benefits, and alternatives. Surgical indications were to relieve pain and mechanical symptoms related to an unstable meniscus tear. Risks included, but were not limited to, that of anesthesia, bleeding, infection, pain, damage to local structures, need for further surgery, failure to improve, stiffness, failure of implants, and loss of function.       ESTIMATED BLOOD LOSS: minimal    DRAINS: none     COMPLICATIONS: none    SPECIMENS: none    OPERATIVE FINDINGS:  1) Exam Under Anesthesia:  ROM 0-130, stable ligamentously without laxity, no effusion  2) Patellofemoral Joint: grade II\\III change inferior half of patella, no loose bodies in gutters  3) Central Compartment:  Intact ACL/PCL  4) Lateral Compartment:  grade II change far posterior lateral plateau, intact lateral meniscus  5) Medial Compartment: intact chondral surfaces, intact medial meniscus    PROCEDURE IN DETAIL:  The patient was seen in the preoperative holding room, the informed consent was reviewed and signed, and the correct operative extremity marked with the patients agreement. The patient was transported to the operating room, where a timeout was performed identifying the correct patient and operative site. Perioperative antibiotics were administered prior to incision. A sterile prep and drape was performed. The operative leg was placed into an arthroscopic leg bocanegra device, the contralateral leg well protected and a tourniquet was placed about the thigh. Total tourniquet time was <30 minutes. A standard anterolateral arthroscopy portal was established and an outside-in technique was used to establish and anteromedial portal.  The arthroscopic probe was inserted and a thorough diagnostic arthroscopy of the knee was performed as outline above. Attention was then turned to the patellofemoral compartment where a probe was used to demonstrate unstable chondral flaps of the inferior pole of the patella. A shaver and cautery device were used to debride the unstable portions of the cartilage. The probe was reinserted verifying the remaining cartilage to be stable without further injury. Free fluid was suctioned from the knee, portals were closed with monocryl suture and steri-strips. A sterile dressing was placed. The patient was awakened from anesthesia, transported to the PACU in stable condition.     POSTOPERATIVE PLAN:  1) Discharge home with family  2) Return to clinic 1 week for a clinical check     Electronically signed by Edis Andersen MD on 1/26/2023 at 12:14 PM

## 2023-01-26 ENCOUNTER — ANESTHESIA EVENT (OUTPATIENT)
Dept: OPERATING ROOM | Age: 23
End: 2023-01-26
Payer: MEDICAID

## 2023-01-26 ENCOUNTER — HOSPITAL ENCOUNTER (OUTPATIENT)
Age: 23
Setting detail: OUTPATIENT SURGERY
Discharge: HOME | End: 2023-01-26
Attending: ORTHOPAEDIC SURGERY | Admitting: ORTHOPAEDIC SURGERY
Payer: MEDICAID

## 2023-01-26 ENCOUNTER — ANESTHESIA (OUTPATIENT)
Dept: OPERATING ROOM | Age: 23
End: 2023-01-26
Payer: MEDICAID

## 2023-01-26 VITALS
HEIGHT: 68 IN | OXYGEN SATURATION: 100 % | DIASTOLIC BLOOD PRESSURE: 78 MMHG | WEIGHT: 293 LBS | RESPIRATION RATE: 20 BRPM | TEMPERATURE: 97.6 F | SYSTOLIC BLOOD PRESSURE: 134 MMHG | HEART RATE: 72 BPM | BODY MASS INDEX: 44.41 KG/M2

## 2023-01-26 DIAGNOSIS — M22.42 CHONDROMALACIA, PATELLA, LEFT: Primary | ICD-10-CM

## 2023-01-26 LAB
GLUCOSE BLD-MCNC: 109 MG/DL (ref 70–99)
HCG(URINE) PREGNANCY TEST: NEGATIVE
PERFORMED ON: ABNORMAL

## 2023-01-26 PROCEDURE — 3600000004 HC SURGERY LEVEL 4 BASE: Performed by: ORTHOPAEDIC SURGERY

## 2023-01-26 PROCEDURE — 2500000003 HC RX 250 WO HCPCS: Performed by: NURSE ANESTHETIST, CERTIFIED REGISTERED

## 2023-01-26 PROCEDURE — 82962 GLUCOSE BLOOD TEST: CPT

## 2023-01-26 PROCEDURE — 3700000001 HC ADD 15 MINUTES (ANESTHESIA): Performed by: ORTHOPAEDIC SURGERY

## 2023-01-26 PROCEDURE — 7100000001 HC PACU RECOVERY - ADDTL 15 MIN: Performed by: ORTHOPAEDIC SURGERY

## 2023-01-26 PROCEDURE — 6360000002 HC RX W HCPCS: Performed by: ORTHOPAEDIC SURGERY

## 2023-01-26 PROCEDURE — 2709999900 HC NON-CHARGEABLE SUPPLY: Performed by: ORTHOPAEDIC SURGERY

## 2023-01-26 PROCEDURE — 7100000011 HC PHASE II RECOVERY - ADDTL 15 MIN: Performed by: ORTHOPAEDIC SURGERY

## 2023-01-26 PROCEDURE — 6360000002 HC RX W HCPCS: Performed by: ANESTHESIOLOGY

## 2023-01-26 PROCEDURE — 84703 CHORIONIC GONADOTROPIN ASSAY: CPT

## 2023-01-26 PROCEDURE — 6360000002 HC RX W HCPCS: Performed by: NURSE ANESTHETIST, CERTIFIED REGISTERED

## 2023-01-26 PROCEDURE — 6370000000 HC RX 637 (ALT 250 FOR IP): Performed by: ANESTHESIOLOGY

## 2023-01-26 PROCEDURE — 2580000003 HC RX 258: Performed by: ORTHOPAEDIC SURGERY

## 2023-01-26 PROCEDURE — 2500000003 HC RX 250 WO HCPCS: Performed by: ORTHOPAEDIC SURGERY

## 2023-01-26 PROCEDURE — 2720000010 HC SURG SUPPLY STERILE: Performed by: ORTHOPAEDIC SURGERY

## 2023-01-26 PROCEDURE — 7100000010 HC PHASE II RECOVERY - FIRST 15 MIN: Performed by: ORTHOPAEDIC SURGERY

## 2023-01-26 PROCEDURE — 3600000014 HC SURGERY LEVEL 4 ADDTL 15MIN: Performed by: ORTHOPAEDIC SURGERY

## 2023-01-26 PROCEDURE — 7100000000 HC PACU RECOVERY - FIRST 15 MIN: Performed by: ORTHOPAEDIC SURGERY

## 2023-01-26 PROCEDURE — 3700000000 HC ANESTHESIA ATTENDED CARE: Performed by: ORTHOPAEDIC SURGERY

## 2023-01-26 RX ORDER — PROCHLORPERAZINE EDISYLATE 5 MG/ML
5 INJECTION INTRAMUSCULAR; INTRAVENOUS
Status: DISCONTINUED | OUTPATIENT
Start: 2023-01-26 | End: 2023-01-26 | Stop reason: HOSPADM

## 2023-01-26 RX ORDER — FAMOTIDINE 20 MG/1
20 TABLET, FILM COATED ORAL ONCE
Status: COMPLETED | OUTPATIENT
Start: 2023-01-26 | End: 2023-01-26

## 2023-01-26 RX ORDER — PROPOFOL 10 MG/ML
INJECTION, EMULSION INTRAVENOUS PRN
Status: DISCONTINUED | OUTPATIENT
Start: 2023-01-26 | End: 2023-01-26 | Stop reason: SDUPTHER

## 2023-01-26 RX ORDER — ONDANSETRON 2 MG/ML
INJECTION INTRAMUSCULAR; INTRAVENOUS PRN
Status: DISCONTINUED | OUTPATIENT
Start: 2023-01-26 | End: 2023-01-26 | Stop reason: SDUPTHER

## 2023-01-26 RX ORDER — SODIUM CHLORIDE 9 MG/ML
INJECTION, SOLUTION INTRAVENOUS PRN
Status: DISCONTINUED | OUTPATIENT
Start: 2023-01-26 | End: 2023-01-26 | Stop reason: HOSPADM

## 2023-01-26 RX ORDER — FENTANYL CITRATE 50 UG/ML
25 INJECTION, SOLUTION INTRAMUSCULAR; INTRAVENOUS
Status: DISCONTINUED | OUTPATIENT
Start: 2023-01-26 | End: 2023-01-26 | Stop reason: HOSPADM

## 2023-01-26 RX ORDER — KETOROLAC TROMETHAMINE 30 MG/ML
INJECTION, SOLUTION INTRAMUSCULAR; INTRAVENOUS PRN
Status: DISCONTINUED | OUTPATIENT
Start: 2023-01-26 | End: 2023-01-26 | Stop reason: HOSPADM

## 2023-01-26 RX ORDER — DIPHENHYDRAMINE HYDROCHLORIDE 50 MG/ML
12.5 INJECTION INTRAMUSCULAR; INTRAVENOUS
Status: DISCONTINUED | OUTPATIENT
Start: 2023-01-26 | End: 2023-01-26 | Stop reason: HOSPADM

## 2023-01-26 RX ORDER — SODIUM CHLORIDE 0.9 % (FLUSH) 0.9 %
5-40 SYRINGE (ML) INJECTION PRN
Status: DISCONTINUED | OUTPATIENT
Start: 2023-01-26 | End: 2023-01-26 | Stop reason: HOSPADM

## 2023-01-26 RX ORDER — SODIUM CHLORIDE, SODIUM LACTATE, POTASSIUM CHLORIDE, CALCIUM CHLORIDE 600; 310; 30; 20 MG/100ML; MG/100ML; MG/100ML; MG/100ML
INJECTION, SOLUTION INTRAVENOUS CONTINUOUS
Status: DISCONTINUED | OUTPATIENT
Start: 2023-01-26 | End: 2023-01-26 | Stop reason: HOSPADM

## 2023-01-26 RX ORDER — DEXAMETHASONE SODIUM PHOSPHATE 10 MG/ML
INJECTION, SOLUTION INTRAMUSCULAR; INTRAVENOUS PRN
Status: DISCONTINUED | OUTPATIENT
Start: 2023-01-26 | End: 2023-01-26 | Stop reason: SDUPTHER

## 2023-01-26 RX ORDER — OXYCODONE HYDROCHLORIDE AND ACETAMINOPHEN 5; 325 MG/1; MG/1
1 TABLET ORAL EVERY 6 HOURS PRN
Qty: 15 TABLET | Refills: 0 | Status: SHIPPED | OUTPATIENT
Start: 2023-01-26 | End: 2023-01-31

## 2023-01-26 RX ORDER — APREPITANT 40 MG/1
40 CAPSULE ORAL ONCE
Status: COMPLETED | OUTPATIENT
Start: 2023-01-26 | End: 2023-01-26

## 2023-01-26 RX ORDER — FENTANYL CITRATE 50 UG/ML
25 INJECTION, SOLUTION INTRAMUSCULAR; INTRAVENOUS EVERY 5 MIN PRN
Status: DISCONTINUED | OUTPATIENT
Start: 2023-01-26 | End: 2023-01-26 | Stop reason: HOSPADM

## 2023-01-26 RX ORDER — MIDAZOLAM HYDROCHLORIDE 2 MG/2ML
2 INJECTION, SOLUTION INTRAMUSCULAR; INTRAVENOUS
Status: DISCONTINUED | OUTPATIENT
Start: 2023-01-26 | End: 2023-01-26 | Stop reason: HOSPADM

## 2023-01-26 RX ORDER — SODIUM CHLORIDE 0.9 % (FLUSH) 0.9 %
5-40 SYRINGE (ML) INJECTION EVERY 12 HOURS SCHEDULED
Status: DISCONTINUED | OUTPATIENT
Start: 2023-01-26 | End: 2023-01-26 | Stop reason: HOSPADM

## 2023-01-26 RX ORDER — MIDAZOLAM HYDROCHLORIDE 1 MG/ML
INJECTION INTRAMUSCULAR; INTRAVENOUS PRN
Status: DISCONTINUED | OUTPATIENT
Start: 2023-01-26 | End: 2023-01-26 | Stop reason: SDUPTHER

## 2023-01-26 RX ORDER — ONDANSETRON 4 MG/1
4 TABLET, FILM COATED ORAL EVERY 8 HOURS PRN
Qty: 10 TABLET | Refills: 0 | Status: SHIPPED | OUTPATIENT
Start: 2023-01-26

## 2023-01-26 RX ORDER — LIDOCAINE HYDROCHLORIDE 10 MG/ML
INJECTION, SOLUTION EPIDURAL; INFILTRATION; INTRACAUDAL; PERINEURAL PRN
Status: DISCONTINUED | OUTPATIENT
Start: 2023-01-26 | End: 2023-01-26 | Stop reason: SDUPTHER

## 2023-01-26 RX ORDER — CLINDAMYCIN PHOSPHATE 900 MG/50ML
900 INJECTION INTRAVENOUS ONCE
Status: COMPLETED | OUTPATIENT
Start: 2023-01-26 | End: 2023-01-26

## 2023-01-26 RX ORDER — FENTANYL CITRATE 50 UG/ML
INJECTION, SOLUTION INTRAMUSCULAR; INTRAVENOUS PRN
Status: DISCONTINUED | OUTPATIENT
Start: 2023-01-26 | End: 2023-01-26 | Stop reason: SDUPTHER

## 2023-01-26 RX ORDER — FENTANYL CITRATE 50 UG/ML
50 INJECTION, SOLUTION INTRAMUSCULAR; INTRAVENOUS EVERY 5 MIN PRN
Status: DISCONTINUED | OUTPATIENT
Start: 2023-01-26 | End: 2023-01-26 | Stop reason: HOSPADM

## 2023-01-26 RX ORDER — LIDOCAINE HYDROCHLORIDE 10 MG/ML
1 INJECTION, SOLUTION EPIDURAL; INFILTRATION; INTRACAUDAL; PERINEURAL
Status: DISCONTINUED | OUTPATIENT
Start: 2023-01-26 | End: 2023-01-26 | Stop reason: HOSPADM

## 2023-01-26 RX ORDER — FENTANYL CITRATE 50 UG/ML
50 INJECTION, SOLUTION INTRAMUSCULAR; INTRAVENOUS
Status: DISCONTINUED | OUTPATIENT
Start: 2023-01-26 | End: 2023-01-26 | Stop reason: HOSPADM

## 2023-01-26 RX ORDER — ONDANSETRON 2 MG/ML
4 INJECTION INTRAMUSCULAR; INTRAVENOUS
Status: DISCONTINUED | OUTPATIENT
Start: 2023-01-26 | End: 2023-01-26 | Stop reason: HOSPADM

## 2023-01-26 RX ADMIN — APREPITANT 40 MG: 40 CAPSULE ORAL at 11:05

## 2023-01-26 RX ADMIN — FENTANYL CITRATE 25 MCG: 50 INJECTION INTRAMUSCULAR; INTRAVENOUS at 12:34

## 2023-01-26 RX ADMIN — CLINDAMYCIN IN 5 PERCENT DEXTROSE 900 MG: 18 INJECTION, SOLUTION INTRAVENOUS at 11:43

## 2023-01-26 RX ADMIN — DEXAMETHASONE SODIUM PHOSPHATE 10 MG: 10 INJECTION, SOLUTION INTRAMUSCULAR; INTRAVENOUS at 11:46

## 2023-01-26 RX ADMIN — SODIUM CHLORIDE, POTASSIUM CHLORIDE, SODIUM LACTATE AND CALCIUM CHLORIDE: 600; 310; 30; 20 INJECTION, SOLUTION INTRAVENOUS at 11:05

## 2023-01-26 RX ADMIN — FAMOTIDINE 20 MG: 20 TABLET, FILM COATED ORAL at 11:05

## 2023-01-26 RX ADMIN — PROPOFOL 200 MG: 10 INJECTION, EMULSION INTRAVENOUS at 11:38

## 2023-01-26 RX ADMIN — MIDAZOLAM 2 MG: 1 INJECTION INTRAMUSCULAR; INTRAVENOUS at 11:30

## 2023-01-26 RX ADMIN — ONDANSETRON 4 MG: 2 INJECTION INTRAMUSCULAR; INTRAVENOUS at 11:46

## 2023-01-26 RX ADMIN — FENTANYL CITRATE 100 MCG: 50 INJECTION, SOLUTION INTRAMUSCULAR; INTRAVENOUS at 11:45

## 2023-01-26 RX ADMIN — LIDOCAINE HYDROCHLORIDE 50 MG: 10 INJECTION, SOLUTION EPIDURAL; INFILTRATION; INTRACAUDAL; PERINEURAL at 11:38

## 2023-01-26 ASSESSMENT — ENCOUNTER SYMPTOMS: SHORTNESS OF BREATH: 0

## 2023-01-26 ASSESSMENT — PAIN DESCRIPTION - LOCATION
LOCATION: KNEE

## 2023-01-26 ASSESSMENT — PAIN DESCRIPTION - ORIENTATION
ORIENTATION: LEFT

## 2023-01-26 ASSESSMENT — PAIN - FUNCTIONAL ASSESSMENT: PAIN_FUNCTIONAL_ASSESSMENT: ACTIVITIES ARE NOT PREVENTED

## 2023-01-26 ASSESSMENT — PAIN DESCRIPTION - DESCRIPTORS
DESCRIPTORS: DISCOMFORT
DESCRIPTORS: ACHING;BURNING
DESCRIPTORS: BURNING
DESCRIPTORS: DISCOMFORT

## 2023-01-26 ASSESSMENT — PAIN SCALES - GENERAL
PAINLEVEL_OUTOF10: 4
PAINLEVEL_OUTOF10: 4
PAINLEVEL_OUTOF10: 5
PAINLEVEL_OUTOF10: 4

## 2023-01-26 ASSESSMENT — PAIN DESCRIPTION - FREQUENCY
FREQUENCY: CONTINUOUS
FREQUENCY: CONTINUOUS

## 2023-01-26 ASSESSMENT — PAIN DESCRIPTION - PAIN TYPE
TYPE: SURGICAL PAIN

## 2023-01-26 ASSESSMENT — PAIN DESCRIPTION - ONSET
ONSET: ON-GOING
ONSET: ON-GOING

## 2023-01-26 ASSESSMENT — LIFESTYLE VARIABLES: SMOKING_STATUS: 0

## 2023-01-26 NOTE — ANESTHESIA PRE PROCEDURE
Department of Anesthesiology  Preprocedure Note       Name:  Lucy Plata   Age:  25 y.o.  :  2000                                          MRN:  [id number]         Date:  2023      Surgeon: Digna Michelle):  Jose Lennon MD    Procedure: Procedure(s):  LEFT KNEE PATELLAR CHONDROPLASTY    Medications prior to admission:   Prior to Admission medications    Medication Sig Start Date End Date Taking? Authorizing Provider   venlafaxine (EFFEXOR XR) 75 MG extended release capsule Take 1 capsule by mouth daily 1/3/23   VIDAL Hugo DO   busPIRone (BUSPAR) 5 MG tablet Take 1 tablet by mouth 2 times daily 1/3/23   B Juliane Hugo, DO   predniSONE (DELTASONE) 10 MG tablet Days 1,2,3 = 60 mg (6 pills), Days 4,5 = 50 mg, Days 6,7 = 40 mg, Day 8 = 30 mg, Day 9 = 20 mg, Day 10 = 10 mg, Day 11,12 = 5 mg (1/2 tab) 22   VIDAL Hugo DO   promethazine-dextromethorphan (PROMETHAZINE-DM) 6.25-15 MG/5ML syrup Take 5 mLs by mouth 4 times daily as needed for Cough 22   EVELYN Elias   ipratropium-albuterol (DUONEB) 0.5-2.5 (3) MG/3ML SOLN nebulizer solution Inhale 3 mLs into the lungs in the morning and 3 mLs at noon and 3 mLs in the evening and 3 mLs before bedtime.  22   EVELYN Erickson   amitriptyline (ELAVIL) 25 MG tablet Take 1 tablet by mouth nightly 22   EVELYN Elias   hydrOXYzine HCl (ATARAX) 25 MG tablet Take 1 tablet by mouth every 6 hours as needed for Itching 22   VIDAL Hugo DO   lamoTRIgine (LAMICTAL) 200 MG tablet Take 1 tablet by mouth 2 times daily 22   VIDAL Hugo DO   metoprolol succinate (TOPROL XL) 50 MG extended release tablet Take 1 tablet by mouth daily 22   VIDAL Hugo DO   brexpiprazole (REXULTI) 0.5 MG TABS tablet Take 1 tablet by mouth daily  Patient taking differently: Take 0.5 mg by mouth nightly 22   B Juliane Hugo DO   albuterol sulfate HFA (VENTOLIN HFA) 108 (90 Base) MCG/ACT inhaler Inhale 2 puffs into the lungs 4 times daily 11/18/22   EVELYN Lamb   ibuprofen (ADVIL;MOTRIN) 800 MG tablet TAKE ONE TABLET BY MOUTH EVERY 8 HOURS AS NEEDED FOR PAIN WITH FOOD OR MILK  Patient taking differently: Take 800 mg by mouth every 8 hours as needed 10/31/22   EVELYN Lamb   amLODIPine (NORVASC) 5 MG tablet Take 1 tablet by mouth daily  Patient taking differently: Take 5 mg by mouth nightly 10/25/22   EVELYN Martini   naproxen (NAPROSYN) 500 MG tablet TAKE ONE TABLET BY MOUTH TWICE DAILY  Patient taking differently: Take 500 mg by mouth 2 times daily (with meals) 10/12/22   EVELYN Martini   fluticasone [facility]) 50 MCG/ACT nasal spray 2 sprays by Nasal route daily 9/26/22   VIDAL Archer DO   venlafaxine (EFFEXOR XR) 150 MG extended release capsule Take 1 capsule by mouth daily 9/13/22   VIDAL Archer DO   norgestimate-ethinyl estradiol (ORTHO-CYCLEN) 0.25-35 MG-MCG per tablet Take 1 tablet by mouth daily 9/9/22   EVELYN Martini   piroxicam (FELDENE) 20 MG capsule TAKE ONE CAPSULE BY MOUTH DAILY  Patient taking differently: Take 20 mg by mouth daily 4/27/22   VIDAL Archer DO   omeprazole (PRILOSEC) 40 MG delayed release capsule Take 1 capsule by mouth every morning (before breakfast) 4/20/22   VIDAL Archer DO   montelukast (SINGULAIR) 10 MG tablet TAKE ONE TABLET BY MOUTH DAILY  Patient taking differently: Take 10 mg by mouth nightly 3/28/22   EVELYN Lamb   atorvastatin (LIPITOR) 40 MG tablet TAKE ONE TABLET BY MOUTH DAILY  Patient taking differently: Take 40 mg by mouth nightly 3/28/22   EVELYN Lamb       Current medications:    Current Facility-Administered Medications   Medication Dose Route Frequency Provider Last Rate Last Admin    clindamycin (CLEOCIN) 900 mg in dextrose 5 % 50 mL IVPB  900 mg IntraVENous Once Azael Walker MD        lactated ringers IV soln infusion   IntraVENous Continuous Jose Lennon MD           Allergies: Allergies   Allergen Reactions    Latex Other (See Comments)     Mother is extremely allergic and will be caring for pt.  Eggs Or Egg-Derived Products      Other reaction(s): Rhinitis    Pcn [Penicillins] Hives    Bactrim [Sulfamethoxazole-Trimethoprim] Rash    Ceclor [Cefaclor] Rash     Childhood allergy    Sulfa Antibiotics Rash       Problem List:    Patient Active Problem List   Diagnosis Code    Asthma J45.909    VSD (ventricular septal defect) Q21.0    S/P  shunt Z98.2    History of Chiari malformation Z86.69    Chondromalacia, patella, left M22.42       Past Medical History:        Diagnosis Date    Asthma     History of Chiari malformation     Indigestion     Knee pain     Seizures (HCC)     VSD (ventricular septal defect)        Past Surgical History:        Procedure Laterality Date    ADENOIDECTOMY      OTHER SURGICAL HISTORY      chiari decompression    TONSILLECTOMY      TYMPANOSTOMY TUBE PLACEMENT      VENTRICULOPERITONEAL SHUNT         Social History:    Social History     Tobacco Use    Smoking status: Never    Smokeless tobacco: Never   Substance Use Topics    Alcohol use: No                                Counseling given: Not Answered      Vital Signs (Current): There were no vitals filed for this visit.                                            BP Readings from Last 3 Encounters:   09/21/22 118/82   02/21/22 124/84   01/14/20 134/88       NPO Status:                                                                                 BMI:   Wt Readings from Last 3 Encounters:   01/18/23 (!) 315 lb (142.9 kg)   09/21/22 (!) 309 lb (140.2 kg)   02/21/22 (!) 309 lb (140.2 kg)     There is no height or weight on file to calculate BMI.    CBC:   Lab Results   Component Value Date/Time    WBC 6.4 01/13/2023 09:32 AM    RBC 4.42 01/13/2023 09:32 AM    HGB 13.3 01/13/2023 09:32 AM    HCT 40.9 01/13/2023 09:32 AM    MCV 92.5 01/13/2023 09:32 AM    RDW 13.4 01/13/2023 09:32 AM     01/13/2023 09:32 AM       CMP:   Lab Results   Component Value Date/Time     01/13/2023 09:32 AM    K 4.0 01/13/2023 09:32 AM     01/13/2023 09:32 AM    CO2 19 01/13/2023 09:32 AM    BUN 14 01/13/2023 09:32 AM    CREATININE 0.6 01/13/2023 09:32 AM    GFRAA >59 07/27/2022 09:21 AM    LABGLOM >60 01/13/2023 09:32 AM    GLUCOSE 101 01/13/2023 09:32 AM    PROT 7.2 01/13/2023 09:32 AM    CALCIUM 9.7 01/13/2023 09:32 AM    BILITOT 0.8 01/13/2023 09:32 AM    ALKPHOS 23 01/13/2023 09:32 AM    AST 18 01/13/2023 09:32 AM    ALT 14 01/13/2023 09:32 AM       POC Tests: No results for input(s): POCGLU, POCNA, POCK, POCCL, POCBUN, POCHEMO, POCHCT in the last 72 hours. Coags: No results found for: PROTIME, INR, APTT    HCG (If Applicable): No results found for: PREGTESTUR, PREGSERUM, HCG, HCGQUANT     ABGs: No results found for: PHART, PO2ART, XQM8VKX, GOZ2CHJ, BEART, U4ANJCBU     Type & Screen (If Applicable):  No results found for: LABABO, LABRH    Drug/Infectious Status (If Applicable):  No results found for: HIV, HEPCAB    COVID-19 Screening (If Applicable):   Lab Results   Component Value Date/Time    COVID19 NOT DETECTED 10/29/2020 03:20 PM           Anesthesia Evaluation  Patient summary reviewed and Nursing notes reviewed no history of anesthetic complications:   Airway: Mallampati: II  TM distance: >3 FB   Neck ROM: full  Mouth opening: > = 3 FB   Dental:          Pulmonary:   (+) sleep apnea: on noncompliant,  asthma: allergic asthma,     (-) shortness of breath and not a current smoker                           Cardiovascular:  Exercise tolerance: good (>4 METS),   (+) hypertension: no interval change, hyperlipidemia    (-) pacemaker, past MI, CAD, CABG/stent, dysrhythmias and  angina    ECG reviewed               Beta Blocker:  Dose within 24 Hrs      ROS comment: Two VSD's that closed spontaneously.   No heart issues at present. EKG:  Narrative & Impression    93 BPM  Sinus rhythm  Inferior and anterior T wave abnormality is nonspecific  Comparison Summary: No serial comparison made  Summary: Borderline ECG         Neuro/Psych:   (+) seizures: well controlled and no interval change,    (-) CVA            ROS comment: Chiari malformation repaired at 12years of age.  shunt in place on right side no issues. GI/Hepatic/Renal:   (+) GERD: well controlled,      (-) liver disease and no renal disease       Endo/Other:    (+) no malignancy/cancer. (-) diabetes mellitus, blood dyscrasia, no malignancy/cancer               Abdominal:   (+) obese,           Vascular:     - DVT and PE. Other Findings:           Anesthesia Plan      general     ASA 3     (Emend and pepcid in preop.)  Induction: intravenous. MIPS: Postoperative opioids intended and Prophylactic antiemetics administered. Anesthetic plan and risks discussed with patient.                         Keven Gould DO   1/26/2023

## 2023-01-26 NOTE — BRIEF OP NOTE
Brief Postoperative Note      Patient: Weston العلي  YOB: 2000  MRN: [id number]    Date of Procedure: 1/26/2023    Pre-Op Diagnosis: Chondromalacia of left knee [M94.262]    Post-Op Diagnosis: Same       Procedure(s):  LEFT KNEE PATELLAR CHONDROPLASTY    Surgeon(s):  Haily Fu MD    Assistant:  * No surgical staff found *    Anesthesia: General    Estimated Blood Loss (mL): Minimal    Complications: None    Specimens:   * No specimens in log *    Implants:  * No implants in log *      Drains: * No LDAs found *    Findings: see op note    Electronically signed by Haily Fu MD on 1/26/2023 at 12:12 PM

## 2023-01-26 NOTE — ANESTHESIA POSTPROCEDURE EVALUATION
Department of Anesthesiology  Postprocedure Note    Patient: Tristin Blevins  MRN: [id number]  YOB: 2000  Date of evaluation: 1/26/2023      Procedure Summary     Date: 01/26/23 Room / Location: [address]    Anesthesia Start: 1130 Anesthesia Stop: 1215    Procedure: LEFT KNEE PATELLAR CHONDROPLASTY (Left: Knee) Diagnosis:       Chondromalacia of left knee      (Chondromalacia of left knee [G46.166])    Surgeons: Usha Cabrera MD Responsible Provider: EVELYN Zhong CRNA    Anesthesia Type: general ASA Status: 3          Anesthesia Type: No value filed.     Klaudia Phase I: Klaudia Score: 10    Klaudia Phase II:        Anesthesia Post Evaluation    Patient location during evaluation: PACU  Patient participation: complete - patient participated  Level of consciousness: responsive to physical stimuli  Pain score: 0  Airway patency: patent  Nausea & Vomiting: no nausea and no vomiting  Complications: no  Cardiovascular status: hemodynamically stable  Respiratory status: acceptable  Hydration status: stable  Comments: Transported to PACU  /72   Pulse 89   Temp 97.5 °F (36.4 °C) (Temporal)   Resp 22   Ht 5' 8\" (1.727 m)   Wt (!) 315 lb (142.9 kg)   LMP 01/17/2023   SpO2 91%   BMI 47.90 kg/m²

## 2023-02-01 ENCOUNTER — TELEPHONE (OUTPATIENT)
Dept: FAMILY MEDICINE CLINIC | Age: 23
End: 2023-02-01

## 2023-02-01 DIAGNOSIS — J45.20 MILD INTERMITTENT ASTHMA WITHOUT COMPLICATION: ICD-10-CM

## 2023-02-01 DIAGNOSIS — G43.911 INTRACTABLE MIGRAINE WITH STATUS MIGRAINOSUS, UNSPECIFIED MIGRAINE TYPE: ICD-10-CM

## 2023-02-01 DIAGNOSIS — M25.562 ACUTE PAIN OF LEFT KNEE: ICD-10-CM
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
## 2023-02-01 DIAGNOSIS — M25.572 ACUTE LEFT ANKLE PAIN: ICD-10-CM

## 2023-02-01 DIAGNOSIS — R09.89 CHEST CONGESTION: Primary | ICD-10-CM

## 2023-02-01 DIAGNOSIS — M79.672 LEFT FOOT PAIN: ICD-10-CM

## 2023-02-01 RX ORDER — MONTELUKAST SODIUM 10 MG/1
10 TABLET ORAL NIGHTLY
Qty: 90 TABLET | Refills: 3 | Status: SHIPPED | OUTPATIENT
Start: 2023-02-01

## 2023-02-01 RX ORDER — ATORVASTATIN CALCIUM 40 MG/1
40 TABLET, FILM COATED ORAL NIGHTLY
Qty: 90 TABLET | Refills: 3 | Status: SHIPPED | OUTPATIENT
Start: 2023-02-01

## 2023-02-01 RX ORDER — AZITHROMYCIN 250 MG/1
250 TABLET, FILM COATED ORAL SEE ADMIN INSTRUCTIONS
Qty: 6 TABLET | Refills: 0 | Status: SHIPPED | OUTPATIENT
Start: 2023-02-01 | End: 2023-02-06

## 2023-02-01 RX ORDER — NAPROXEN 500 MG/1
500 TABLET ORAL 2 TIMES DAILY WITH MEALS
Qty: 60 TABLET | Refills: 5 | Status: SHIPPED | OUTPATIENT
Start: 2023-02-01

## 2023-02-01 NOTE — TELEPHONE ENCOUNTER
Patient called stating she is needing a referral to Dr. Kemar Leary. Patient has not been seen by Dr. Daniel Cronin in over 3 years and they informed patient she would need new referral. Will forward to Dr. Vanita Ayala for approval. Patient is also requesting medication to be sent in due to congestion. Patient states she does not want it to go to her  lungs. I informed patient that provider would want to see her and we are only doing VV today. Patient states she has no way to get here, and would like medication sent in. Will send to Dr. Vanita Ayala to advise.

## 2023-02-01 NOTE — TELEPHONE ENCOUNTER
We can try azithromycin 250 mg  2 tablets (500 mg) on day 1, followed by 1 tablet daily for the next 4 days.   Dispense number 6 tablets with no refills

## 2023-02-01 NOTE — TELEPHONE ENCOUNTER
Received fax from pharmacy requesting refill on pts medication(s). Pt was last seen in office on 12/30/2022  and has a follow up scheduled for Visit date not found. Will send request to  Pharmacy  for patient.      Requested Prescriptions     Signed Prescriptions Disp Refills    montelukast (SINGULAIR) 10 MG tablet 90 tablet 3     Sig: Take 1 tablet by mouth nightly     Authorizing Provider: Vesta Sheikh     Ordering User: Kaylah Harper    atorvastatin (LIPITOR) 40 MG tablet 90 tablet 3     Sig: Take 1 tablet by mouth nightly     Authorizing Provider: Vesta Sheikh     Ordering User: Kaylah Harper

## 2023-02-01 NOTE — TELEPHONE ENCOUNTER
Requested Prescriptions     Pending Prescriptions Disp Refills    azithromycin (ZITHROMAX) 250 MG tablet 6 tablet 0     Sig: Take 1 tablet by mouth See Admin Instructions for 5 days 500mg on day 1 followed by 250mg on days 2 - 5     Sending medication to Dr. Vu Murphy for approval.      Is it okay for Dr. Tejinder Cardenas referral? Please advise.

## 2023-02-01 NOTE — TELEPHONE ENCOUNTER
Sent rx to pharmacy for pt and entered referral for pt.      Requested Prescriptions     Signed Prescriptions Disp Refills    azithromycin (ZITHROMAX) 250 MG tablet 6 tablet 0     Sig: Take 1 tablet by mouth See Admin Instructions for 5 days 500mg on day 1 followed by 250mg on days 2 - 5     Authorizing Provider: Nikhil Bourgeois     Ordering User: Stephanie Perez

## 2023-02-01 NOTE — TELEPHONE ENCOUNTER
Received fax from pharmacy requesting refill on pts medication(s). Pt was last seen in office on 12/30/2022  and has a follow up scheduled for 2/1/2023. Will send request to  Dr. Chriss Jhaveri  for authorization.      Requested Prescriptions     Pending Prescriptions Disp Refills    naproxen (NAPROSYN) 500 MG tablet [Pharmacy Med Name: naproxen 500 mg tablet] 60 tablet 3     Sig: TAKE ONE TABLET BY MOUTH TWICE DAILY

## 2023-02-20 DIAGNOSIS — J01.01 ACUTE RECURRENT MAXILLARY SINUSITIS: ICD-10-CM

## 2023-02-20 RX ORDER — DEXTROMETHORPHAN HYDROBROMIDE AND PROMETHAZINE HYDROCHLORIDE 15; 6.25 MG/5ML; MG/5ML
5 SYRUP ORAL 4 TIMES DAILY PRN
Qty: 180 ML | Refills: 0 | Status: SHIPPED | OUTPATIENT
Start: 2023-02-20

## 2023-02-20 NOTE — TELEPHONE ENCOUNTER
Received fax from pharmacy requesting refill on pts medication(s). Pt was last seen in office on 13/30/2022 and has a follow up scheduled for 3/30/2023. Will send request to  Dr. Tristan Tolbert  for authorization.      Requested Prescriptions     Pending Prescriptions Disp Refills    promethazine-dextromethorphan (PROMETHAZINE-DM) 6.25-15 MG/5ML syrup [Pharmacy Med Name: promethazine-DM 6.25 mg-15 mg/5 mL oral syrup] 180 mL 0     Sig: Take 5 mLs by mouth 4 times daily as needed for Cough

## 2023-02-22 NOTE — TELEPHONE ENCOUNTER
Received fax from pharmacy requesting refill on pts medication(s). Pt was last seen in office on Visit date not found  and has a follow up scheduled for 3/30/2023. Will send request to  Dr. Chriss Jhaveri  for authorization.      Requested Prescriptions     Pending Prescriptions Disp Refills    piroxicam (FELDENE) 20 MG capsule [Pharmacy Med Name: piroxicam 20 mg capsule] 30 capsule 11     Sig: TAKE ONE CAPSULE BY MOUTH DAILY

## 2023-02-28 RX ORDER — OMEPRAZOLE 40 MG/1
40 CAPSULE, DELAYED RELEASE ORAL DAILY
Qty: 60 CAPSULE | Refills: 5 | Status: SHIPPED | OUTPATIENT
Start: 2023-02-28

## 2023-02-28 NOTE — TELEPHONE ENCOUNTER
Received fax from pharmacy requesting refill on pts medication(s). Pt was last seen in office on Visit date not found  and has a follow up scheduled for 3/30/2023. Will send request to  Dr. Fritz Nolasco  for authorization.      Requested Prescriptions     Pending Prescriptions Disp Refills    omeprazole (PRILOSEC) 40 MG delayed release capsule [Pharmacy Med Name: omeprazole 40 mg capsule,delayed release] 60 capsule 5     Sig: TAKE ONE CAPSULE BY MOUTH BEFORE BREAKFAST

## 2023-03-27 DIAGNOSIS — J01.01 ACUTE RECURRENT MAXILLARY SINUSITIS: ICD-10-CM

## 2023-03-27 RX ORDER — DEXTROMETHORPHAN HYDROBROMIDE AND PROMETHAZINE HYDROCHLORIDE 15; 6.25 MG/5ML; MG/5ML
5 SYRUP ORAL 4 TIMES DAILY PRN
Qty: 180 ML | Refills: 0 | Status: SHIPPED | OUTPATIENT
Start: 2023-03-27

## 2023-03-27 NOTE — TELEPHONE ENCOUNTER
Received fax from pharmacy requesting refill on pts medication(s). Pt was last seen in office on Visit date not found  and has a follow up scheduled for Visit date not found. Will send request to  Dr. Denver Wood  for authorization.      Requested Prescriptions     Pending Prescriptions Disp Refills    promethazine-dextromethorphan (PROMETHAZINE-DM) 6.25-15 MG/5ML syrup [Pharmacy Med Name: promethazine-DM 6.25 mg-15 mg/5 mL oral syrup] 180 mL 0     Sig: TAKE FIVE MLS BY MOUTH FOUR TIMES DAILY AS NEEDED FOR COUGH

## 2023-03-29 DIAGNOSIS — F32.A ANXIETY AND DEPRESSION: ICD-10-CM

## 2023-03-29 DIAGNOSIS — G47.09 OTHER INSOMNIA: ICD-10-CM

## 2023-03-29 DIAGNOSIS — F41.9 ANXIETY AND DEPRESSION: ICD-10-CM

## 2023-03-29 RX ORDER — HYDROXYZINE HYDROCHLORIDE 25 MG/1
25 TABLET, FILM COATED ORAL EVERY 6 HOURS PRN
Qty: 90 TABLET | Refills: 5 | Status: SHIPPED | OUTPATIENT
Start: 2023-03-29

## 2023-03-29 NOTE — TELEPHONE ENCOUNTER
Received fax from pharmacy requesting refill on pts medication(s). Pt was last seen in office on 12/30/2022  and has a follow up scheduled for Visit date not found. Will send request to  Dr. Peter Humphrey  for authorization.      Requested Prescriptions     Pending Prescriptions Disp Refills    hydrOXYzine HCl (ATARAX) 25 MG tablet [Pharmacy Med Name: hydroxyzine HCl 25 mg tablet] 90 tablet 5     Sig: Take 1 tablet by mouth every 6 hours as needed for Itching

## 2023-04-26 DIAGNOSIS — F32.A ANXIETY AND DEPRESSION: ICD-10-CM

## 2023-04-26 DIAGNOSIS — F41.9 ANXIETY AND DEPRESSION: ICD-10-CM

## 2023-04-26 RX ORDER — VENLAFAXINE HYDROCHLORIDE 75 MG/1
75 CAPSULE, EXTENDED RELEASE ORAL DAILY
Qty: 90 CAPSULE | Refills: 3 | Status: SHIPPED | OUTPATIENT
Start: 2023-04-26

## 2023-04-26 NOTE — TELEPHONE ENCOUNTER
Received fax from pharmacy requesting refill on pts medication(s). Pt was last seen in office on 12/30/2022 and has a follow up scheduled for 6/29/2023. Will send request to  Dr. Flaco Barrow  for authorization.      Requested Prescriptions     Pending Prescriptions Disp Refills    venlafaxine (EFFEXOR XR) 75 MG extended release capsule [Pharmacy Med Name: venlafaxine ER 75 mg capsule,extended release 24 hr] 90 capsule 3     Sig: Take 1 capsule by mouth daily    brexpiprazole (REXULTI) 0.5 MG TABS tablet [Pharmacy Med Name: Rexulti 0.5 mg tablet] 90 tablet 3     Sig: Take 1 tablet by mouth once for 1 dose

## 2023-05-01 ENCOUNTER — TELEPHONE (OUTPATIENT)
Dept: NEUROLOGY | Age: 23
End: 2023-05-01

## 2023-05-23 DIAGNOSIS — F51.01 PRIMARY INSOMNIA: ICD-10-CM

## 2023-05-23 DIAGNOSIS — F32.A ANXIETY AND DEPRESSION: ICD-10-CM

## 2023-05-23 DIAGNOSIS — F41.9 ANXIETY AND DEPRESSION: ICD-10-CM

## 2023-05-23 RX ORDER — AMITRIPTYLINE HYDROCHLORIDE 25 MG/1
TABLET, FILM COATED ORAL
Qty: 30 TABLET | Refills: 5 | Status: SHIPPED | OUTPATIENT
Start: 2023-05-23

## 2023-05-23 RX ORDER — VENLAFAXINE HYDROCHLORIDE 150 MG/1
150 CAPSULE, EXTENDED RELEASE ORAL DAILY
Qty: 90 CAPSULE | Refills: 3 | Status: SHIPPED | OUTPATIENT
Start: 2023-05-23

## 2023-05-23 NOTE — TELEPHONE ENCOUNTER
Received fax from pharmacy requesting refill on pts medication(s). Pt was last seen in office on Visit date not found  and has a follow up scheduled for 6/29/2023. Will send request to  Dr. Elpidio Avalos  for authorization.      Requested Prescriptions     Pending Prescriptions Disp Refills    venlafaxine (EFFEXOR XR) 150 MG extended release capsule [Pharmacy Med Name: venlafaxine  mg capsule,extended release 24 hr] 90 capsule 3     Sig: Take 1 capsule by mouth daily

## 2023-05-23 NOTE — TELEPHONE ENCOUNTER
Received fax from pharmacy requesting refill on pts medication(s). Pt was last seen in office on Visit date not found  and has a follow up scheduled for 5/23/2023. Will send request to  Jerome Marshall  for authorization.      Requested Prescriptions     Pending Prescriptions Disp Refills    amitriptyline (ELAVIL) 25 MG tablet [Pharmacy Med Name: amitriptyline 25 mg tablet] 30 tablet 5     Sig: TAKE ONE TABLET BY MOUTH AT BEDTIME

## 2023-05-28 DIAGNOSIS — J01.01 ACUTE RECURRENT MAXILLARY SINUSITIS: ICD-10-CM

## 2023-05-30 RX ORDER — DEXTROMETHORPHAN HYDROBROMIDE AND PROMETHAZINE HYDROCHLORIDE 15; 6.25 MG/5ML; MG/5ML
5 SYRUP ORAL 4 TIMES DAILY PRN
Qty: 180 ML | Refills: 0 | Status: SHIPPED | OUTPATIENT
Start: 2023-05-30

## 2023-05-30 NOTE — TELEPHONE ENCOUNTER
Received fax from pharmacy requesting refill on pts medication(s). Pt was last seen in office on 12/30/2022 and has a follow up scheduled for 6/29/2023. Will send request to  Dr. Peace Dai  for authorization.      Requested Prescriptions     Pending Prescriptions Disp Refills    promethazine-dextromethorphan (PROMETHAZINE-DM) 6.25-15 MG/5ML syrup 180 mL 0     Sig: Take 5 mLs by mouth 4 times daily as needed for Cough

## 2023-06-02 ENCOUNTER — TELEPHONE (OUTPATIENT)
Dept: FAMILY MEDICINE CLINIC | Age: 23
End: 2023-06-02

## 2023-06-02 NOTE — TELEPHONE ENCOUNTER
Pt called, she needs her med list to her eye dr at [phone].  Done through [facility]

## 2023-06-29 ENCOUNTER — OFFICE VISIT (OUTPATIENT)
Dept: FAMILY MEDICINE CLINIC | Age: 23
End: 2023-06-29
Payer: MEDICAID

## 2023-06-29 VITALS
WEIGHT: 293 LBS | SYSTOLIC BLOOD PRESSURE: 132 MMHG | TEMPERATURE: 97.1 F | DIASTOLIC BLOOD PRESSURE: 88 MMHG | BODY MASS INDEX: 44.41 KG/M2 | HEART RATE: 119 BPM | OXYGEN SATURATION: 98 % | HEIGHT: 68 IN

## 2023-06-29 DIAGNOSIS — R10.11 RUQ PAIN: ICD-10-CM

## 2023-06-29 DIAGNOSIS — M62.838 MUSCLE SPASM: ICD-10-CM

## 2023-06-29 DIAGNOSIS — K21.9 GASTROESOPHAGEAL REFLUX DISEASE, UNSPECIFIED WHETHER ESOPHAGITIS PRESENT: Primary | ICD-10-CM

## 2023-06-29 PROCEDURE — 99214 OFFICE O/P EST MOD 30 MIN: CPT | Performed by: PEDIATRICS

## 2023-06-29 RX ORDER — FAMOTIDINE 40 MG/1
40 TABLET, FILM COATED ORAL NIGHTLY
Qty: 30 TABLET | Refills: 3 | Status: SHIPPED | OUTPATIENT
Start: 2023-06-29

## 2023-06-29 RX ORDER — CYCLOBENZAPRINE HCL 10 MG
10 TABLET ORAL 3 TIMES DAILY PRN
Qty: 30 TABLET | Refills: 0 | Status: SHIPPED | OUTPATIENT
Start: 2023-06-29 | End: 2023-07-09

## 2023-06-29 RX ORDER — OMEPRAZOLE 40 MG/1
40 CAPSULE, DELAYED RELEASE ORAL 2 TIMES DAILY
Qty: 60 CAPSULE | Refills: 5 | Status: SHIPPED | OUTPATIENT
Start: 2023-06-29

## 2023-06-29 SDOH — ECONOMIC STABILITY: FOOD INSECURITY: WITHIN THE PAST 12 MONTHS, YOU WORRIED THAT YOUR FOOD WOULD RUN OUT BEFORE YOU GOT MONEY TO BUY MORE.: NEVER TRUE

## 2023-06-29 SDOH — ECONOMIC STABILITY: HOUSING INSECURITY
IN THE LAST 12 MONTHS, WAS THERE A TIME WHEN YOU DID NOT HAVE A STEADY PLACE TO SLEEP OR SLEPT IN A SHELTER (INCLUDING NOW)?: NO

## 2023-06-29 SDOH — ECONOMIC STABILITY: TRANSPORTATION INSECURITY
IN THE PAST 12 MONTHS, HAS LACK OF TRANSPORTATION KEPT YOU FROM MEETINGS, WORK, OR FROM GETTING THINGS NEEDED FOR DAILY LIVING?: PATIENT DECLINED

## 2023-06-29 SDOH — ECONOMIC STABILITY: FOOD INSECURITY: WITHIN THE PAST 12 MONTHS, THE FOOD YOU BOUGHT JUST DIDN'T LAST AND YOU DIDN'T HAVE MONEY TO GET MORE.: NEVER TRUE

## 2023-06-29 SDOH — ECONOMIC STABILITY: INCOME INSECURITY: HOW HARD IS IT FOR YOU TO PAY FOR THE VERY BASICS LIKE FOOD, HOUSING, MEDICAL CARE, AND HEATING?: NOT HARD AT ALL

## 2023-06-29 ASSESSMENT — PATIENT HEALTH QUESTIONNAIRE - PHQ9
5. POOR APPETITE OR OVEREATING: 0
4. FEELING TIRED OR HAVING LITTLE ENERGY: 0
9. THOUGHTS THAT YOU WOULD BE BETTER OFF DEAD, OR OF HURTING YOURSELF: 0
9. THOUGHTS THAT YOU WOULD BE BETTER OFF DEAD, OR OF HURTING YOURSELF: NOT AT ALL
8. MOVING OR SPEAKING SO SLOWLY THAT OTHER PEOPLE COULD HAVE NOTICED. OR THE OPPOSITE - BEING SO FIDGETY OR RESTLESS THAT YOU HAVE BEEN MOVING AROUND A LOT MORE THAN USUAL: NOT AT ALL
7. TROUBLE CONCENTRATING ON THINGS, SUCH AS READING THE NEWSPAPER OR WATCHING TELEVISION: 0
7. TROUBLE CONCENTRATING ON THINGS, SUCH AS READING THE NEWSPAPER OR WATCHING TELEVISION: NOT AT ALL
SUM OF ALL RESPONSES TO PHQ QUESTIONS 1-9: 0
SUM OF ALL RESPONSES TO PHQ QUESTIONS 1-9: 0
8. MOVING OR SPEAKING SO SLOWLY THAT OTHER PEOPLE COULD HAVE NOTICED. OR THE OPPOSITE, BEING SO FIGETY OR RESTLESS THAT YOU HAVE BEEN MOVING AROUND A LOT MORE THAN USUAL: 0
SUM OF ALL RESPONSES TO PHQ QUESTIONS 1-9: 0
6. FEELING BAD ABOUT YOURSELF - OR THAT YOU ARE A FAILURE OR HAVE LET YOURSELF OR YOUR FAMILY DOWN: 0
6. FEELING BAD ABOUT YOURSELF - OR THAT YOU ARE A FAILURE OR HAVE LET YOURSELF OR YOUR FAMILY DOWN: NOT AT ALL
SUM OF ALL RESPONSES TO PHQ QUESTIONS 1-9: 0
2. FEELING DOWN, DEPRESSED OR HOPELESS: NOT AT ALL
10. IF YOU CHECKED OFF ANY PROBLEMS, HOW DIFFICULT HAVE THESE PROBLEMS MADE IT FOR YOU TO DO YOUR WORK, TAKE CARE OF THINGS AT HOME, OR GET ALONG WITH OTHER PEOPLE: 0
3. TROUBLE FALLING OR STAYING ASLEEP: 0
SUM OF ALL RESPONSES TO PHQ9 QUESTIONS 1 & 2: 0
10. IF YOU CHECKED OFF ANY PROBLEMS, HOW DIFFICULT HAVE THESE PROBLEMS MADE IT FOR YOU TO DO YOUR WORK, TAKE CARE OF THINGS AT HOME, OR GET ALONG WITH OTHER PEOPLE: NOT DIFFICULT AT ALL
1. LITTLE INTEREST OR PLEASURE IN DOING THINGS: 0
5. POOR APPETITE OR OVEREATING: NOT AT ALL
4. FEELING TIRED OR HAVING LITTLE ENERGY: NOT AT ALL
2. FEELING DOWN, DEPRESSED OR HOPELESS: 0
1. LITTLE INTEREST OR PLEASURE IN DOING THINGS: NOT AT ALL
SUM OF ALL RESPONSES TO PHQ QUESTIONS 1-9: 0
3. TROUBLE FALLING OR STAYING ASLEEP: NOT AT ALL

## 2023-06-29 ASSESSMENT — ENCOUNTER SYMPTOMS: ABDOMINAL PAIN: 1

## 2023-07-06 RX ORDER — AMLODIPINE BESYLATE 5 MG/1
5 TABLET ORAL EVERY EVENING
Qty: 90 TABLET | Refills: 0 | Status: SHIPPED | OUTPATIENT
Start: 2023-07-06

## 2023-07-06 NOTE — TELEPHONE ENCOUNTER
Sent to pharmacy for pt    Requested Prescriptions     Signed Prescriptions Disp Refills    amLODIPine (NORVASC) 5 MG tablet 90 tablet 0     Sig: Take 1 tablet by mouth every evening     Authorizing Provider: Roscoe Harris     Ordering User: Arlington Schirmer

## 2023-07-06 NOTE — TELEPHONE ENCOUNTER
Received fax from pharmacy requesting refill on pts medication(s). Pt was last seen in office on 6/29/2023  and has a follow up scheduled for 7/27/2023. Will send request to  Dr. Thierry Miranda  for authorization.      Requested Prescriptions     Pending Prescriptions Disp Refills    amLODIPine (NORVASC) 5 MG tablet [Pharmacy Med Name: amlodipine 5 mg tablet] 90 tablet 0     Sig: TAKE ONE TABLET BY MOUTH IN THE EVENING

## 2023-07-12 ENCOUNTER — HOSPITAL ENCOUNTER (OUTPATIENT)
Dept: ULTRASOUND IMAGING | Age: 23
Discharge: HOME | End: 2023-07-12
Payer: MEDICAID

## 2023-07-12 DIAGNOSIS — R10.11 RUQ PAIN: ICD-10-CM

## 2023-07-12 PROCEDURE — 76705 ECHO EXAM OF ABDOMEN: CPT

## 2023-07-13 ENCOUNTER — TELEPHONE (OUTPATIENT)
Dept: FAMILY MEDICINE CLINIC | Age: 23
End: 2023-07-13

## 2023-07-13 NOTE — TELEPHONE ENCOUNTER
Called patient, spoke with: Patient regarding the results of the patients most recent US . I advised Patient of Dr. Nathaly Piña recommendations.    Patient did voice understanding

## 2023-07-13 NOTE — TELEPHONE ENCOUNTER
----- Message from [address], DO sent at 7/13/2023  3:16 PM CDT -----  Ultrasound of the right upper quadrant shows normal liver and gallbladder.
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
## 2023-07-18 DIAGNOSIS — M25.562 ACUTE PAIN OF LEFT KNEE: ICD-10-CM

## 2023-07-18 DIAGNOSIS — M25.572 ACUTE LEFT ANKLE PAIN: ICD-10-CM

## 2023-07-18 DIAGNOSIS — F32.A ANXIETY AND DEPRESSION: ICD-10-CM

## 2023-07-18 DIAGNOSIS — F41.9 ANXIETY AND DEPRESSION: ICD-10-CM

## 2023-07-18 DIAGNOSIS — G47.09 OTHER INSOMNIA: ICD-10-CM

## 2023-07-18 DIAGNOSIS — M79.672 LEFT FOOT PAIN: ICD-10-CM

## 2023-07-18 RX ORDER — HYDROXYZINE HYDROCHLORIDE 25 MG/1
25 TABLET, FILM COATED ORAL EVERY 6 HOURS PRN
Qty: 90 TABLET | Refills: 5 | Status: SHIPPED | OUTPATIENT
Start: 2023-07-18

## 2023-07-18 RX ORDER — NAPROXEN 500 MG/1
500 TABLET ORAL 2 TIMES DAILY WITH MEALS
Qty: 60 TABLET | Refills: 5 | Status: SHIPPED | OUTPATIENT
Start: 2023-07-18

## 2023-07-18 NOTE — TELEPHONE ENCOUNTER
Received fax from pharmacy requesting refill on pts medication(s). Pt was last seen in office on 6/29/2023  and has a follow up scheduled for Visit date not found. Will send request to  Dr. Kay Davis  for authorization.      Requested Prescriptions     Pending Prescriptions Disp Refills    naproxen (NAPROSYN) 500 MG tablet [Pharmacy Med Name: naproxen 500 mg tablet] 60 tablet 5     Sig: Take 1 tablet by mouth 2 times daily (with meals)    hydrOXYzine HCl (ATARAX) 25 MG tablet [Pharmacy Med Name: hydroxyzine HCl 25 mg tablet] 90 tablet 5     Sig: Take 1 tablet by mouth every 6 hours as needed for Itching

## 2023-07-21 DIAGNOSIS — M62.838 MUSCLE SPASM: ICD-10-CM

## 2023-07-21 RX ORDER — CYCLOBENZAPRINE HCL 10 MG
10 TABLET ORAL 3 TIMES DAILY PRN
Qty: 30 TABLET | Refills: 3 | Status: SHIPPED | OUTPATIENT
Start: 2023-07-21

## 2023-07-21 RX ORDER — CYCLOBENZAPRINE HCL 10 MG
TABLET ORAL
Qty: 30 TABLET | Refills: 3 | OUTPATIENT
Start: 2023-07-21

## 2023-07-21 NOTE — TELEPHONE ENCOUNTER
Received fax from pharmacy requesting refill on pts medication(s). Pt was last seen in office on 6/29/2023  and has a follow up scheduled for Visit date not found. Will send request to  Dr. Darnell Yuan  for authorization.      Requested Prescriptions     Pending Prescriptions Disp Refills    cyclobenzaprine (FLEXERIL) 10 MG tablet [Pharmacy Med Name: cyclobenzaprine 10 mg tablet] 30 tablet 3     Sig: Take 1 tablet by mouth 3 times daily as needed for Muscle spasms

## 2023-08-01 DIAGNOSIS — G47.09 OTHER INSOMNIA: ICD-10-CM

## 2023-08-01 RX ORDER — NORGESTIMATE AND ETHINYL ESTRADIOL 0.25-0.035
KIT ORAL
Qty: 84 TABLET | Refills: 3 | Status: SHIPPED | OUTPATIENT
Start: 2023-08-01

## 2023-08-01 NOTE — TELEPHONE ENCOUNTER
Received fax from pharmacy requesting refill on pts medication(s). Pt was last seen in office on 6/29/2023  and has a follow up scheduled for Visit date not found. Will send request to  Dr Christopher Rain for patient.      Requested Prescriptions     Pending Prescriptions Disp Refills    [facility] 0.25-35 MG-MCG per tablet [Pharmacy Med Name: [place] 0.25 mg-35 mcg tablet] 84 tablet 3     Sig: TAKE ONE TABLET BY MOUTH DAILY

## 2023-08-09 DIAGNOSIS — J01.01 ACUTE RECURRENT MAXILLARY SINUSITIS: ICD-10-CM

## 2023-08-10 RX ORDER — DEXTROMETHORPHAN HYDROBROMIDE AND PROMETHAZINE HYDROCHLORIDE 15; 6.25 MG/5ML; MG/5ML
SYRUP ORAL
Qty: 180 ML | Refills: 0 | Status: SHIPPED | OUTPATIENT
Start: 2023-08-10

## 2023-08-16 DIAGNOSIS — G89.29 CHRONIC MIDLINE LOW BACK PAIN WITHOUT SCIATICA: Primary | ICD-10-CM

## 2023-08-16 DIAGNOSIS — G89.29 CHRONIC MIDLINE THORACIC BACK PAIN: ICD-10-CM

## 2023-08-16 DIAGNOSIS — M54.6 CHRONIC MIDLINE THORACIC BACK PAIN: ICD-10-CM

## 2023-08-16 DIAGNOSIS — M54.50 CHRONIC MIDLINE LOW BACK PAIN WITHOUT SCIATICA: Primary | ICD-10-CM

## 2023-08-16 NOTE — PROGRESS NOTES
capsule 3    brexpiprazole (REXULTI) 0.5 MG TABS tablet Take 1 tablet by mouth once for 1 dose 90 tablet 3    piroxicam (FELDENE) 20 MG capsule Take 1 capsule by mouth daily 30 capsule 11    montelukast (SINGULAIR) 10 MG tablet Take 1 tablet by mouth nightly 90 tablet 3    atorvastatin (LIPITOR) 40 MG tablet Take 1 tablet by mouth nightly 90 tablet 3    busPIRone (BUSPAR) 5 MG tablet Take 1 tablet by mouth 2 times daily 180 tablet 3    ipratropium-albuterol (DUONEB) 0.5-2.5 (3) MG/3ML SOLN nebulizer solution Inhale 3 mLs into the lungs in the morning and 3 mLs at noon and 3 mLs in the evening and 3 mLs before bedtime. 360 mL 1    lamoTRIgine (LAMICTAL) 200 MG tablet Take 1 tablet by mouth 2 times daily 60 tablet 11    metoprolol succinate (TOPROL XL) 50 MG extended release tablet Take 1 tablet by mouth daily 30 tablet 11    albuterol sulfate HFA (VENTOLIN HFA) 108 (90 Base) MCG/ACT inhaler Inhale 2 puffs into the lungs 4 times daily 18 g 5    ibuprofen (ADVIL;MOTRIN) 800 MG tablet TAKE ONE TABLET BY MOUTH EVERY 8 HOURS AS NEEDED FOR PAIN WITH FOOD OR MILK (Patient taking differently: Take 800 mg by mouth every 8 hours as needed) 120 tablet 3    fluticasone (FLONASE) 50 MCG/ACT nasal spray 2 sprays by Nasal route daily 48 g 1     No current facility-administered medications for this visit.        Allergies: Latex, Eggs or egg-derived products, Pcn [penicillins], Bactrim [sulfamethoxazole-trimethoprim], Ceclor [cefaclor], and Sulfa antibiotics     Past Medical History:   Diagnosis Date    Asthma     History of Chiari malformation     Hypertension     Indigestion     Knee pain     Seizures (HCC)     VSD (ventricular septal defect)        Family History   Problem Relation Age of Onset    Asthma Mother     Lupus Mother     High Blood Pressure Mother     Asthma Maternal Grandmother     Asthma Maternal Grandfather     Cancer Maternal Grandfather     Stroke Maternal Grandfather     Diabetes Maternal Grandfather     Asthma

## 2023-08-24 DIAGNOSIS — M62.838 MUSCLE SPASM: ICD-10-CM

## 2023-08-24 RX ORDER — CYCLOBENZAPRINE HCL 10 MG
10 TABLET ORAL 3 TIMES DAILY PRN
Qty: 30 TABLET | Refills: 3 | Status: SHIPPED | OUTPATIENT
Start: 2023-08-24

## 2023-08-24 NOTE — TELEPHONE ENCOUNTER
Received fax from pharmacy requesting refill on pts medication(s). Pt was last seen in office on 6/29/2023  and no follow up scheduled. Will send request to  Dr. Sarai Saucedo  for patient.      Requested Prescriptions     Pending Prescriptions Disp Refills    cyclobenzaprine (FLEXERIL) 10 MG tablet 30 tablet 3     Sig: Take 1 tablet by mouth 3 times daily as needed for Muscle spasms

## 2023-08-29 ENCOUNTER — PATIENT MESSAGE (OUTPATIENT)
Dept: FAMILY MEDICINE CLINIC | Age: 23
End: 2023-08-29

## 2023-08-29 NOTE — TELEPHONE ENCOUNTER
Using a nasal spray can also help with allergies. Such as Flonase or Nasonex. Allergen avoidance if you know you are allergic to. Singulair can also be used as a nighttime dose of 10 mg daily. Occasionally people will note improvement with changing their antihistamine, i.e. Allegra instead of Zyrtec or Claritin. Finally an additional nasal spray called Atrovent can be added to the steroid as noted above.

## 2023-08-30 RX ORDER — IPRATROPIUM BROMIDE 42 UG/1
2 SPRAY, METERED NASAL 4 TIMES DAILY
Qty: 15 ML | Refills: 3 | Status: SHIPPED | OUTPATIENT
Start: 2023-08-30

## 2023-09-12 DIAGNOSIS — K21.9 GASTROESOPHAGEAL REFLUX DISEASE, UNSPECIFIED WHETHER ESOPHAGITIS PRESENT: ICD-10-CM

## 2023-09-12 RX ORDER — FAMOTIDINE 40 MG/1
40 TABLET, FILM COATED ORAL NIGHTLY
Qty: 30 TABLET | Refills: 3 | Status: SHIPPED | OUTPATIENT
Start: 2023-09-12

## 2023-09-12 NOTE — TELEPHONE ENCOUNTER
Received fax from pharmacy requesting refill on pts medication(s). Pt was last seen in office on 6/29/2023  and has a follow up scheduled for Visit date not found. Will send request to  Dr. Bob Orellana  for authorization.      Requested Prescriptions     Pending Prescriptions Disp Refills    famotidine (PEPCID) 40 MG tablet [Pharmacy Med Name: famotidine 40 mg tablet] 30 tablet 3     Sig: Take 1 tablet by mouth at bedtime

## 2023-09-29 RX ORDER — AMLODIPINE BESYLATE 5 MG/1
5 TABLET ORAL DAILY
Qty: 90 TABLET | Refills: 0 | Status: SHIPPED | OUTPATIENT
Start: 2023-09-29

## 2023-09-29 NOTE — TELEPHONE ENCOUNTER
Received fax from pharmacy requesting refill on pts medication(s). Pt was last seen in office on 6/29/2023  and has a follow up scheduled for Visit date not found. Will send request to  Dr. Sp Sullivan  for authorization.      Requested Prescriptions     Pending Prescriptions Disp Refills    amLODIPine (NORVASC) 5 MG tablet [Pharmacy Med Name: amlodipine 5 mg tablet] 90 tablet 0     Sig: TAKE ONE TABLET BY MOUTH IN THE EVENING

## 2023-10-09 DIAGNOSIS — J01.01 ACUTE RECURRENT MAXILLARY SINUSITIS: ICD-10-CM

## 2023-10-09 RX ORDER — DEXTROMETHORPHAN HYDROBROMIDE AND PROMETHAZINE HYDROCHLORIDE 15; 6.25 MG/5ML; MG/5ML
5 SYRUP ORAL 4 TIMES DAILY PRN
Qty: 180 ML | Refills: 0 | Status: SHIPPED | OUTPATIENT
Start: 2023-10-09

## 2023-10-09 NOTE — TELEPHONE ENCOUNTER
Received fax from pharmacy requesting refill on pts medication(s). Pt was last seen in office on 6/29/2023  and has a follow up scheduled for Visit date not found. Will send request to  Dr. Peterson King  for patient.      Requested Prescriptions     Pending Prescriptions Disp Refills    promethazine-dextromethorphan (PROMETHAZINE-DM) 6.25-15 MG/5ML syrup 180 mL 0     Sig: Take 5 mLs by mouth 4 times daily as needed for Cough

## 2023-10-10 DIAGNOSIS — M62.838 MUSCLE SPASM: ICD-10-CM

## 2023-10-10 RX ORDER — CYCLOBENZAPRINE HCL 10 MG
10 TABLET ORAL 3 TIMES DAILY PRN
Qty: 30 TABLET | Refills: 3 | Status: SHIPPED | OUTPATIENT
Start: 2023-10-10

## 2023-10-10 NOTE — TELEPHONE ENCOUNTER
Received fax from pharmacy requesting refill on pts medication(s). Pt was last seen in office on 6/29/2023  and has a follow up scheduled for Visit date not found. Will send request to  Dr. Dean Rodriguez  for patient.      Requested Prescriptions     Pending Prescriptions Disp Refills    cyclobenzaprine (FLEXERIL) 10 MG tablet [Pharmacy Med Name: cyclobenzaprine 10 mg tablet] 30 tablet 3     Sig: Take 1 tablet by mouth 3 times daily as needed for Muscle spasms

## 2023-11-06 DIAGNOSIS — F32.A ANXIETY AND DEPRESSION: ICD-10-CM

## 2023-11-06 DIAGNOSIS — G47.09 OTHER INSOMNIA: ICD-10-CM

## 2023-11-06 DIAGNOSIS — F41.9 ANXIETY AND DEPRESSION: ICD-10-CM

## 2023-11-06 DIAGNOSIS — R00.2 PALPITATIONS: ICD-10-CM

## 2023-11-06 DIAGNOSIS — I10 PRIMARY HYPERTENSION: ICD-10-CM

## 2023-11-06 DIAGNOSIS — R00.0 TACHYCARDIA: ICD-10-CM

## 2023-11-06 DIAGNOSIS — F51.01 PRIMARY INSOMNIA: ICD-10-CM

## 2023-11-06 RX ORDER — HYDROXYZINE HYDROCHLORIDE 25 MG/1
25 TABLET, FILM COATED ORAL EVERY 6 HOURS PRN
Qty: 90 TABLET | Refills: 5 | Status: SHIPPED | OUTPATIENT
Start: 2023-11-06

## 2023-11-06 RX ORDER — LAMOTRIGINE 200 MG/1
200 TABLET ORAL 2 TIMES DAILY
Qty: 60 TABLET | Refills: 11 | Status: SHIPPED | OUTPATIENT
Start: 2023-11-06

## 2023-11-06 RX ORDER — AMITRIPTYLINE HYDROCHLORIDE 25 MG/1
25 TABLET, FILM COATED ORAL NIGHTLY
Qty: 90 TABLET | Refills: 3 | Status: SHIPPED | OUTPATIENT
Start: 2023-11-06

## 2023-11-06 RX ORDER — METOPROLOL SUCCINATE 50 MG/1
50 TABLET, EXTENDED RELEASE ORAL EVERY MORNING
Qty: 30 TABLET | Refills: 11 | Status: SHIPPED | OUTPATIENT
Start: 2023-11-06

## 2023-11-06 NOTE — TELEPHONE ENCOUNTER
Received fax from pharmacy requesting refill on pts medication(s). Pt was last seen in office on 6/29/2023  and has a follow up scheduled for 11/6/2023. Will send request to  Dr. Allegra Almaraz  for authorization.      Requested Prescriptions     Pending Prescriptions Disp Refills    metoprolol succinate (TOPROL XL) 50 MG extended release tablet [Pharmacy Med Name: metoprolol succinate ER 50 mg tablet,extended release 24 hr] 30 tablet 11     Sig: TAKE ONE TABLET BY MOUTH IN THE MORNING    hydrOXYzine HCl (ATARAX) 25 MG tablet [Pharmacy Med Name: hydroxyzine HCl 25 mg tablet] 90 tablet 5     Sig: Take 1 tablet by mouth every 6 hours as needed for Itching    lamoTRIgine (LAMICTAL) 200 MG tablet [Pharmacy Med Name: lamotrigine 200 mg tablet] 60 tablet 11     Sig: TAKE ONE TABLET BY MOUTH TWICE DAILY

## 2023-11-07 ENCOUNTER — TELEMEDICINE (OUTPATIENT)
Dept: FAMILY MEDICINE CLINIC | Age: 23
End: 2023-11-07
Payer: MEDICAID

## 2023-11-07 DIAGNOSIS — F32.A ANXIETY AND DEPRESSION: ICD-10-CM

## 2023-11-07 DIAGNOSIS — J45.40 MODERATE PERSISTENT ASTHMA WITHOUT COMPLICATION: ICD-10-CM

## 2023-11-07 DIAGNOSIS — G89.29 CHRONIC RIGHT SHOULDER PAIN: Primary | ICD-10-CM

## 2023-11-07 DIAGNOSIS — E78.2 MIXED HYPERLIPIDEMIA: ICD-10-CM

## 2023-11-07 DIAGNOSIS — K21.9 GASTROESOPHAGEAL REFLUX DISEASE, UNSPECIFIED WHETHER ESOPHAGITIS PRESENT: ICD-10-CM

## 2023-11-07 DIAGNOSIS — M25.511 CHRONIC RIGHT SHOULDER PAIN: Primary | ICD-10-CM

## 2023-11-07 DIAGNOSIS — I10 PRIMARY HYPERTENSION: ICD-10-CM

## 2023-11-07 DIAGNOSIS — F41.9 ANXIETY AND DEPRESSION: ICD-10-CM

## 2023-11-07 PROCEDURE — 99214 OFFICE O/P EST MOD 30 MIN: CPT | Performed by: PEDIATRICS

## 2023-11-07 ASSESSMENT — ENCOUNTER SYMPTOMS: ABDOMINAL PAIN: 1

## 2023-11-07 NOTE — PROGRESS NOTES
[address] [place] [address]  Phone [phone]   Fax [phone]      OFFICE VISIT: 2023    Leoncio Aquinobitt-: 2000      HPI  Reason For Visit:  Leoncio Anderson is a 26 y.o. Shoulder Pain (Going on few weeks /Burning with Chew Smoke movement\"/Hits her randomly /Left shoulder ) and Nasal Congestion (Sinus issues over the weekend /Sinus headache all day )    Patient presents via MyChart complaining of left shoulder pain. She believes that she may have a rotator cuff tear. This is very painful if she moves it the wrong way. This has been going on for about 2 months. She has had problems with her shoulder in the past.  She has taken nonsteroidal including naproxen sodium as well as ibuprofen 800 mg for this. She has also taken prednisone intermittently for her shoulder pain. She also has a lot of sinus pressure and pain. She is getting some thick secretions out. She is sneezing and coughing. No fever or chills. She thinks that she can get away without an antibiotic. Hypertension:   BP today was   BP Readings from Last 1 Encounters:   23 132/88      Recent BP readings:    BP Readings from Last 3 Encounters:   23 132/88   23 134/78   22 118/82     Medication   Metoprolol succinate 50 mg daily   Amlodipine 5 mg daily  Medication compliance:  compliant most of the time  Home blood pressure monitoring: Yes - controlled. She is not adherent to a low sodium diet. Symptoms: none  Laboratory:  Lab Results   Component Value Date    BUN 14 2023    CREATININE 0.6 2023       Hyperlipidemia:   Medication:   atorvastatin (Lipitor) 40 mg nightly  Low Fat, Low Choleterol Diet:  yes -to some degree  Myalgias or GI upset: no  The patient exercises rarely.   Laboratory:    Lab Results   Component Value Date    CHOL 237 (H) 2022    TRIG 88 2022    HDL 50 (L) 2023    LDLCALC 148 2023    LDLDIRECT 106

## 2023-11-08 ENCOUNTER — PATIENT MESSAGE (OUTPATIENT)
Dept: FAMILY MEDICINE CLINIC | Age: 23
End: 2023-11-08

## 2023-11-08 RX ORDER — DOXYCYCLINE HYCLATE 100 MG
100 TABLET ORAL 2 TIMES DAILY
Qty: 20 TABLET | Refills: 0 | Status: SHIPPED | OUTPATIENT
Start: 2023-11-08 | End: 2023-11-18

## 2023-11-08 RX ORDER — IPRATROPIUM BROMIDE 42 UG/1
2 SPRAY, METERED NASAL 4 TIMES DAILY
Qty: 15 ML | Refills: 3 | Status: SHIPPED | OUTPATIENT
Start: 2023-11-08

## 2023-11-08 NOTE — TELEPHONE ENCOUNTER
From: Whit Dey  To: Dr. Hugo Parker: 11/8/2023 7:07 AM CST  Subject: Antibiotic    Doc had talked about last night sending an antibiotic in for me due to my allergies acting up is there anyway he can go ahead and send it in.   I seen him last night virtual.

## 2023-11-08 NOTE — TELEPHONE ENCOUNTER
Received fax from pharmacy requesting refill on pts medication(s). Pt was last seen in office on 2023  and has a follow up scheduled for 2024. Will send request to  Dr. Versie Scheuermann  for authorization.      Requested Prescriptions     Pending Prescriptions Disp Refills    ipratropium (ATROVENT) 0.06 % nasal spray [Pharmacy Med Name: ipratropium bromide 42 mcg (0.06 %) nasal spray] 15 mL 3     Si sprays by Nasal route 4 times daily

## 2023-11-13 ENCOUNTER — OFFICE VISIT (OUTPATIENT)
Dept: FAMILY MEDICINE CLINIC | Age: 23
End: 2023-11-13

## 2023-11-13 DIAGNOSIS — G89.29 CHRONIC LEFT SHOULDER PAIN: Primary | ICD-10-CM

## 2023-11-13 DIAGNOSIS — M25.512 CHRONIC LEFT SHOULDER PAIN: Primary | ICD-10-CM

## 2023-11-13 DIAGNOSIS — M19.012 PRIMARY OSTEOARTHRITIS OF LEFT SHOULDER: ICD-10-CM

## 2023-11-13 RX ORDER — TRIAMCINOLONE ACETONIDE 40 MG/ML
40 INJECTION, SUSPENSION INTRA-ARTICULAR; INTRAMUSCULAR ONCE
Status: COMPLETED | OUTPATIENT
Start: 2023-11-13 | End: 2023-11-13

## 2023-11-13 RX ADMIN — TRIAMCINOLONE ACETONIDE 40 MG: 40 INJECTION, SUSPENSION INTRA-ARTICULAR; INTRAMUSCULAR at 09:40

## 2023-11-13 NOTE — PROGRESS NOTES
[address] [place] [address]  Phone [phone]   Fax [phone]      OFFICE VISIT: 2023    Pauline Machado-: 2000      HPI  Reason For Visit:  Pauline Lawrence is a 26 y.o. No chief complaint on file. Patient presents with complaints of left shoulder pain. This is a chronic process. She has been diagnosed with arthritis in her shoulder. She has had shoulder injections in the past with significant benefit. She is requesting a shoulder injection today. Medication:   Amitriptyline 25 mg nightly   Naproxen sodium 500 mg twice daily as needed   Piroxicam 20 mg daily routinely   Effexor  mg daily  Symptoms: As above    I will agreed to give her a shoulder injection today. vitals were not taken for this visit. There is no height or weight on file to calculate BMI. I have reviewed the following with the Ms. Orlando   Lab Review  No visits with results within 6 Month(s) from this visit. Latest known visit with results is:   Admission on 2023, Discharged on 2023   Component Date Value    HCG(Urine) Pregnancy Test 2023 Negative     POC Glucose 2023 109 (H)     Performed on 2023 AccuChek      Copies of these are in the chart.     Current Outpatient Medications   Medication Sig Dispense Refill    ipratropium (ATROVENT) 0.06 % nasal spray 2 sprays by Nasal route 4 times daily 15 mL 3    doxycycline hyclate (VIBRA-TABS) 100 MG tablet Take 1 tablet by mouth 2 times daily for 10 days 20 tablet 0    metoprolol succinate (TOPROL XL) 50 MG extended release tablet TAKE ONE TABLET BY MOUTH IN THE MORNING 30 tablet 11    amitriptyline (ELAVIL) 25 MG tablet Take 1 tablet by mouth nightly 90 tablet 3    hydrOXYzine HCl (ATARAX) 25 MG tablet Take 1 tablet by mouth every 6 hours as needed for Itching 90 tablet 5    lamoTRIgine (LAMICTAL) 200 MG tablet TAKE ONE TABLET BY MOUTH TWICE DAILY 60 tablet 11    cyclobenzaprine (FLEXERIL) 10

## 2023-11-15 DIAGNOSIS — M62.838 MUSCLE SPASM: ICD-10-CM

## 2023-11-15 RX ORDER — CYCLOBENZAPRINE HCL 10 MG
10 TABLET ORAL 3 TIMES DAILY PRN
Qty: 30 TABLET | Refills: 3 | Status: SHIPPED | OUTPATIENT
Start: 2023-11-15

## 2023-11-15 NOTE — TELEPHONE ENCOUNTER
Received fax from pharmacy requesting refill on pts medication(s). Pt was last seen in office on 11/13/2023  and has a follow up scheduled for 2/8/2024. Will send request to  Dr. Versie Scheuermann  for authorization.      Requested Prescriptions     Pending Prescriptions Disp Refills    cyclobenzaprine (FLEXERIL) 10 MG tablet [Pharmacy Med Name: cyclobenzaprine 10 mg tablet] 30 tablet 3     Sig: Take 1 tablet by mouth 3 times daily as needed for Muscle spasms

## 2023-11-16 DIAGNOSIS — M62.838 MUSCLE SPASM: ICD-10-CM

## 2023-11-16 RX ORDER — CYCLOBENZAPRINE HCL 10 MG
TABLET ORAL
Qty: 30 TABLET | Refills: 3 | OUTPATIENT
Start: 2023-11-16

## 2023-12-06 ENCOUNTER — TELEPHONE (OUTPATIENT)
Dept: FAMILY MEDICINE CLINIC | Age: 23
End: 2023-12-06

## 2023-12-06 NOTE — TELEPHONE ENCOUNTER
Received call from [facility] with Cover My Meds stating that pt is going to need another PA on her Rexulti. He wanted to let us know this now so there is not a lapse in refills.      Key [id number]

## 2023-12-07 NOTE — TELEPHONE ENCOUNTER
Per cover my meds:    Information regarding your request  Member should be able to get the drug/product without a PA at this time

## 2024-02-15 DIAGNOSIS — F32.A ANXIETY AND DEPRESSION: ICD-10-CM

## 2024-02-15 DIAGNOSIS — F41.9 ANXIETY AND DEPRESSION: ICD-10-CM

## 2024-02-15 DIAGNOSIS — G47.09 OTHER INSOMNIA: ICD-10-CM

## 2024-02-15 DIAGNOSIS — M62.838 MUSCLE SPASM: ICD-10-CM

## 2024-02-15 RX ORDER — CYCLOBENZAPRINE HCL 10 MG
10 TABLET ORAL 3 TIMES DAILY PRN
Qty: 30 TABLET | Refills: 3 | Status: SHIPPED | OUTPATIENT
Start: 2024-02-15

## 2024-02-15 RX ORDER — HYDROXYZINE HYDROCHLORIDE 25 MG/1
25 TABLET, FILM COATED ORAL EVERY 6 HOURS PRN
Qty: 90 TABLET | Refills: 5 | Status: SHIPPED | OUTPATIENT
Start: 2024-02-15

## 2024-02-15 NOTE — TELEPHONE ENCOUNTER
Received call/My Chart Message from patient requesting refill on medication(s). Pt was last seen in office on 11/13/2023  and has a follow up scheduled for 3/13/2024. Will send request to provider for authorization.     Requested Prescriptions     Pending Prescriptions Disp Refills    cyclobenzaprine (FLEXERIL) 10 MG tablet 30 tablet 3     Sig: Take 1 tablet by mouth 3 times daily as needed for Muscle spasms    hydrOXYzine HCl (ATARAX) 25 MG tablet 90 tablet 5     Sig: Take 1 tablet by mouth every 6 hours as needed for Itching

## 2024-02-21 ENCOUNTER — PATIENT MESSAGE (OUTPATIENT)
Dept: FAMILY MEDICINE CLINIC | Age: 24
End: 2024-02-21

## 2024-02-29 ENCOUNTER — PATIENT MESSAGE (OUTPATIENT)
Dept: FAMILY MEDICINE CLINIC | Age: 24
End: 2024-02-29

## 2024-02-29 RX ORDER — CLINDAMYCIN HYDROCHLORIDE 300 MG/1
300 CAPSULE ORAL 3 TIMES DAILY
Qty: 30 CAPSULE | Refills: 0 | Status: SHIPPED | OUTPATIENT
Start: 2024-02-29 | End: 2024-03-10

## 2024-02-29 NOTE — TELEPHONE ENCOUNTER
From: Luzma Orlando  To: Dr. VIDAL Aragon  Sent: 2/29/2024 6:44 AM CST  Subject: Question     Is there anyway doc can send in an antibiotic for me [facility]? I have a toothache that has made the right side of my swollen.

## 2024-03-13 ENCOUNTER — TELEPHONE (OUTPATIENT)
Dept: FAMILY MEDICINE CLINIC | Age: 24
End: 2024-03-13

## 2024-03-20 ENCOUNTER — PATIENT MESSAGE (OUTPATIENT)
Dept: FAMILY MEDICINE CLINIC | Age: 24
End: 2024-03-20

## 2024-04-01 ENCOUNTER — PATIENT MESSAGE (OUTPATIENT)
Dept: FAMILY MEDICINE CLINIC | Age: 24
End: 2024-04-01

## 2024-04-01 DIAGNOSIS — N39.0 URINARY TRACT INFECTION WITHOUT HEMATURIA, SITE UNSPECIFIED: Primary | ICD-10-CM

## 2024-04-01 NOTE — TELEPHONE ENCOUNTER
From: Luzma Orlando  To: Dr. VIDAL Aragon  Sent: 4/1/2024 8:45 AM CDT  Subject: Uti    I think I may have a UTI is there anyone who could send in an antibiotic for me,?  Please send it to [facility]

## 2024-05-03 ENCOUNTER — TELEPHONE (OUTPATIENT)
Dept: FAMILY MEDICINE CLINIC | Age: 24
End: 2024-05-03

## 2024-05-03 NOTE — TELEPHONE ENCOUNTER
----- Message from Regan Torresgreg sent at 5/3/2024  2:46 PM CDT -----  Regarding: ECC Escalation To Practice  ECC Escalation To Practice      Type of Escalation: Red Flag Symptom  --------------------------------------------------------------------------------------------------------------------------    Information for Provider:  Patient is looking for appointment for: Symptom Left arm pain  Reasons for Message: Other      Additional Information Patient experiencing a left arm pain and an existing carpal tunnel issue.   --------------------------------------------------------------------------------------------------------------------------    Relationship to Patient: Self     Call Back Info: OK to leave message on voicemail  Preferred Call Back Number: Phone [phone] (home)

## 2024-05-07 ENCOUNTER — TELEMEDICINE (OUTPATIENT)
Dept: FAMILY MEDICINE CLINIC | Age: 24
End: 2024-05-07
Payer: COMMERCIAL

## 2024-05-07 DIAGNOSIS — R00.0 TACHYCARDIA: ICD-10-CM

## 2024-05-07 DIAGNOSIS — I10 PRIMARY HYPERTENSION: ICD-10-CM

## 2024-05-07 DIAGNOSIS — S46.912A STRAIN OF LEFT SHOULDER, INITIAL ENCOUNTER: Primary | ICD-10-CM

## 2024-05-07 DIAGNOSIS — M62.838 MUSCLE SPASM: ICD-10-CM

## 2024-05-07 DIAGNOSIS — R00.2 PALPITATIONS: ICD-10-CM

## 2024-05-07 DIAGNOSIS — G56.03 BILATERAL CARPAL TUNNEL SYNDROME: ICD-10-CM

## 2024-05-07 DIAGNOSIS — G47.09 OTHER INSOMNIA: ICD-10-CM

## 2024-05-07 DIAGNOSIS — F32.A ANXIETY AND DEPRESSION: ICD-10-CM

## 2024-05-07 DIAGNOSIS — F41.9 ANXIETY AND DEPRESSION: ICD-10-CM

## 2024-05-07 PROCEDURE — 99214 OFFICE O/P EST MOD 30 MIN: CPT | Performed by: PEDIATRICS

## 2024-05-07 RX ORDER — AMLODIPINE BESYLATE 5 MG/1
5 TABLET ORAL DAILY
Qty: 90 TABLET | Refills: 3 | Status: SHIPPED | OUTPATIENT
Start: 2024-05-07

## 2024-05-07 RX ORDER — PREDNISONE 10 MG/1
TABLET ORAL
Qty: 43 TABLET | Refills: 0 | Status: SHIPPED | OUTPATIENT
Start: 2024-05-07

## 2024-05-07 RX ORDER — METOPROLOL SUCCINATE 50 MG/1
50 TABLET, EXTENDED RELEASE ORAL EVERY MORNING
Qty: 90 TABLET | Refills: 3 | Status: SHIPPED | OUTPATIENT
Start: 2024-05-07

## 2024-05-07 RX ORDER — CYCLOBENZAPRINE HCL 10 MG
10 TABLET ORAL 3 TIMES DAILY PRN
Qty: 90 TABLET | Refills: 3 | Status: SHIPPED | OUTPATIENT
Start: 2024-05-07

## 2024-05-07 RX ORDER — HYDROXYZINE HYDROCHLORIDE 25 MG/1
25 TABLET, FILM COATED ORAL EVERY 6 HOURS PRN
Qty: 90 TABLET | Refills: 3 | Status: SHIPPED | OUTPATIENT
Start: 2024-05-07

## 2024-05-07 SDOH — ECONOMIC STABILITY: FOOD INSECURITY: WITHIN THE PAST 12 MONTHS, YOU WORRIED THAT YOUR FOOD WOULD RUN OUT BEFORE YOU GOT MONEY TO BUY MORE.: OFTEN TRUE

## 2024-05-07 SDOH — ECONOMIC STABILITY: INCOME INSECURITY: HOW HARD IS IT FOR YOU TO PAY FOR THE VERY BASICS LIKE FOOD, HOUSING, MEDICAL CARE, AND HEATING?: HARD

## 2024-05-07 SDOH — ECONOMIC STABILITY: FOOD INSECURITY: WITHIN THE PAST 12 MONTHS, THE FOOD YOU BOUGHT JUST DIDN'T LAST AND YOU DIDN'T HAVE MONEY TO GET MORE.: OFTEN TRUE

## 2024-05-07 ASSESSMENT — ENCOUNTER SYMPTOMS
EYES NEGATIVE: 1
SINUS PAIN: 1
RESPIRATORY NEGATIVE: 1
SINUS PRESSURE: 1

## 2024-05-07 ASSESSMENT — PATIENT HEALTH QUESTIONNAIRE - PHQ9: DEPRESSION UNABLE TO ASSESS: PT REFUSES

## 2024-05-07 NOTE — PROGRESS NOTES
[address] [place] [address]  Phone [phone]   Fax [phone]      OFFICE VISIT: 2024    Luzma Orlando-: 2000      HPI  Reason For Visit:  Luzma is a 23 y.o.     No chief complaint on file.    Patient presents MyChart video conferencing with complaints of carpal tunnel syndrome.  She is also having left shoulder pain.  She did try to lift something about 2 weeks ago.  If she moves a certain way, she will have a sudden severe pain causing her to yell out.  Medication:   Piroxicam 20mg daily (this is not seeming to help much at all)  She dose do pretty well with prednisone.  She can take tylenol with prednisone.      She has been battling with a sinus infection.  These symptoms just started today.  We discussed that the steroid may help with this as well.         vitals were not taken for this visit.      There is no height or weight on file to calculate BMI.      I have reviewed the following with the Ms. Orlando   Lab Review  No visits with results within 6 Month(s) from this visit.   Latest known visit with results is:   Admission on 2023, Discharged on 2023   Component Date Value    HCG(Urine) Pregnancy Test 2023 Negative     POC Glucose 2023 109 (H)     Performed on 2023 AccuChek      Copies of these are in the chart.    Current Outpatient Medications   Medication Sig Dispense Refill    predniSONE (DELTASONE) 10 MG tablet Days 1,2,3 = 60 mg (6 pills), Days 4,5 = 50 mg, Days 6,7 = 40 mg, Day 8 = 30 mg, Day 9 = 20 mg, Day 10 = 10 mg, Day 11,12 = 5 mg (1/2 tab) 43 tablet 0    cyclobenzaprine (FLEXERIL) 10 MG tablet Take 1 tablet by mouth 3 times daily as needed for Muscle spasms 90 tablet 3    hydrOXYzine HCl (ATARAX) 25 MG tablet Take 1 tablet by mouth every 6 hours as needed for Itching 90 tablet 3    metoprolol succinate (TOPROL XL) 50 MG extended release tablet Take 1 tablet by mouth every morning 90 tablet 3

## 2024-05-07 NOTE — PATIENT INSTRUCTIONS
[facility]               Safety Resources*     Emergency: 911    Non-Emergency Dispatch: [phone]    [facility]   24 Hour Crisis Line: 1-629.549.4002 [phone]  [facility] is committed to improving the lives of those affected by domestic violence: women, men, and children. We are the state-designated and only certified domestic violence program in the [facility] serving [facility], [place], [place], [facility], [place], [place], and [facility]. The campus includes a 36-bed emergency shelter, a central administration/counseling and dining area, and outreach and counseling services buildings.     Domestic Violence Hotline    [phone]    Domestic Violence Victim Services - [place]    Need Help Now?   Domestic Violence Helpline:   1-746-TO END DV or 1-291.905.1954 (Voice)   1-827.389.2056 (TTY)   The hotline is toll free, confidential, multilingual, and open 24-hour    [facility]   [address] [place] [address]   24 Hour Helpline: 1-857.583.8286   [email or web address].org/    [place] is [place]'s [facility] children's advocacy and sexual assault center  serving all 8 [facility] of the [facility]. We are committed to serving all  survivors and do not discriminate against anyone on any basis.    [facility]    [address] [place] [address]    www.childwatchcac.org    [phone]    [place] to provide children and families in [facility] with education, a dvocacy and treatment that prevents child abuse, encourages family stability,  promotes healthy relationships, enhances individual wellbeing and cultivates  safe, nurturing communities.    [facility]   Financial Resources*    Crisis Resources    [facility] Family Support Office  [address] [place] [address]  [phone]  [facility] Services   [address] [place] [address],

## 2024-06-03 ENCOUNTER — TELEMEDICINE (OUTPATIENT)
Dept: FAMILY MEDICINE CLINIC | Age: 24
End: 2024-06-03
Payer: COMMERCIAL

## 2024-06-03 DIAGNOSIS — M25.512 CHRONIC LEFT SHOULDER PAIN: ICD-10-CM

## 2024-06-03 DIAGNOSIS — J45.20 MILD INTERMITTENT ASTHMA WITHOUT COMPLICATION: ICD-10-CM

## 2024-06-03 DIAGNOSIS — I10 PRIMARY HYPERTENSION: Primary | ICD-10-CM

## 2024-06-03 DIAGNOSIS — Z91.09 ENVIRONMENTAL ALLERGIES: ICD-10-CM

## 2024-06-03 DIAGNOSIS — K21.9 GASTROESOPHAGEAL REFLUX DISEASE, UNSPECIFIED WHETHER ESOPHAGITIS PRESENT: ICD-10-CM

## 2024-06-03 DIAGNOSIS — E78.2 MIXED HYPERLIPIDEMIA: ICD-10-CM

## 2024-06-03 DIAGNOSIS — G89.29 CHRONIC RIGHT-SIDED LOW BACK PAIN WITH RIGHT-SIDED SCIATICA: ICD-10-CM

## 2024-06-03 DIAGNOSIS — F41.9 ANXIETY AND DEPRESSION: ICD-10-CM

## 2024-06-03 DIAGNOSIS — M54.41 CHRONIC RIGHT-SIDED LOW BACK PAIN WITH RIGHT-SIDED SCIATICA: ICD-10-CM

## 2024-06-03 DIAGNOSIS — F32.A ANXIETY AND DEPRESSION: ICD-10-CM

## 2024-06-03 DIAGNOSIS — G56.03 BILATERAL CARPAL TUNNEL SYNDROME: ICD-10-CM

## 2024-06-03 DIAGNOSIS — G89.29 CHRONIC LEFT SHOULDER PAIN: ICD-10-CM

## 2024-06-03 PROCEDURE — 99214 OFFICE O/P EST MOD 30 MIN: CPT | Performed by: PEDIATRICS

## 2024-06-03 SDOH — ECONOMIC STABILITY: INCOME INSECURITY: HOW HARD IS IT FOR YOU TO PAY FOR THE VERY BASICS LIKE FOOD, HOUSING, MEDICAL CARE, AND HEATING?: HARD

## 2024-06-03 SDOH — ECONOMIC STABILITY: FOOD INSECURITY: WITHIN THE PAST 12 MONTHS, THE FOOD YOU BOUGHT JUST DIDN'T LAST AND YOU DIDN'T HAVE MONEY TO GET MORE.: OFTEN TRUE

## 2024-06-03 SDOH — ECONOMIC STABILITY: FOOD INSECURITY: WITHIN THE PAST 12 MONTHS, YOU WORRIED THAT YOUR FOOD WOULD RUN OUT BEFORE YOU GOT MONEY TO BUY MORE.: OFTEN TRUE

## 2024-06-03 ASSESSMENT — PATIENT HEALTH QUESTIONNAIRE - PHQ9
3. TROUBLE FALLING OR STAYING ASLEEP: NOT AT ALL
7. TROUBLE CONCENTRATING ON THINGS, SUCH AS READING THE NEWSPAPER OR WATCHING TELEVISION: NOT AT ALL
10. IF YOU CHECKED OFF ANY PROBLEMS, HOW DIFFICULT HAVE THESE PROBLEMS MADE IT FOR YOU TO DO YOUR WORK, TAKE CARE OF THINGS AT HOME, OR GET ALONG WITH OTHER PEOPLE: NOT DIFFICULT AT ALL
8. MOVING OR SPEAKING SO SLOWLY THAT OTHER PEOPLE COULD HAVE NOTICED. OR THE OPPOSITE, BEING SO FIGETY OR RESTLESS THAT YOU HAVE BEEN MOVING AROUND A LOT MORE THAN USUAL: NOT AT ALL
SUM OF ALL RESPONSES TO PHQ QUESTIONS 1-9: 0
6. FEELING BAD ABOUT YOURSELF - OR THAT YOU ARE A FAILURE OR HAVE LET YOURSELF OR YOUR FAMILY DOWN: NOT AT ALL
SUM OF ALL RESPONSES TO PHQ9 QUESTIONS 1 & 2: 0
SUM OF ALL RESPONSES TO PHQ QUESTIONS 1-9: 0
SUM OF ALL RESPONSES TO PHQ QUESTIONS 1-9: 0
9. THOUGHTS THAT YOU WOULD BE BETTER OFF DEAD, OR OF HURTING YOURSELF: NOT AT ALL
SUM OF ALL RESPONSES TO PHQ QUESTIONS 1-9: 0
1. LITTLE INTEREST OR PLEASURE IN DOING THINGS: NOT AT ALL
2. FEELING DOWN, DEPRESSED OR HOPELESS: NOT AT ALL
4. FEELING TIRED OR HAVING LITTLE ENERGY: NOT AT ALL
5. POOR APPETITE OR OVEREATING: NOT AT ALL

## 2024-06-03 ASSESSMENT — ENCOUNTER SYMPTOMS
RESPIRATORY NEGATIVE: 1
EYES NEGATIVE: 1
SINUS PAIN: 1
SINUS PRESSURE: 1

## 2024-06-03 NOTE — PATIENT INSTRUCTIONS
[place], [place]  [facility]   [address] [facility]   Typically serve a daily meal and serve as point of contact for Meals on Wheels [facility]  [address] [place] [address]  [phone]    Food Pantry  Food distribution. Most require person to bring ID/documentation. Contact for information.    [facility]   [address] [place], [place] [address]  [phone]  [facility]  [address] [place], [place] [address]   [phone]    [facility]  Typically serve a daily lunch during the week- contact for meal times.  [employer]   [address] [place], [place] [address]  [phone]  Serving a free take-out dinner from 4:00 PM through 5:30 PM Monday through Friday and a dine-in dinner Tuesday through Friday.    [facility]   Typically serve a daily meal and serve as point of contact for Meals on Wheels [facility]  [address] [place], [place] [address]  [phone]    Food Pantry  Food distribution. Most require person to bring ID/documentation. Contact for information.    [facility]   [address] [place], [place] [address]  [phone]  [facility]  [address] [address]  [phone]  [facility]  [address] [place], [place] [address]  [phone]    [facility]   Typically serve a daily meal and serve as point of contact for Meals on Wheels [facility]  [address] [place], [place] [address]   [phone]    Food Pantry  Food distribution. Most require person to bring ID/documentation. Contact for information.    [facility]  [address] [place], [place] [address]  [phone]  [facility]  [address] [place] [facility]

## 2024-06-03 NOTE — PROGRESS NOTES
[address] [place] [address]  Phone [phone]   Fax [phone]      OFFICE VISIT: 6/3/2024    Luzma Orlando-: 2000      HPI  Reason For Visit:  Luzma is a 23 y.o.     Follow-up    Patient presents on follow-up for multiple health issues.  Present concerns:    She is having a lot of pain in her back.  This is her low back predominantly on the right side.  She has had sciatica as well.  This is been going on for months now.  She has failed nonsteroidal anti-inflammatories as well as prednisone.  We discussed x-rays of the lumbar spine.  We will order these today      Hypertension:   BP today was   BP Readings from Last 1 Encounters:   23 132/88      Recent BP readings:    BP Readings from Last 3 Encounters:   23 132/88   23 134/78   22 118/82     Medication   Amlodipine 5 mg daily   Metoprolol succinate 50 mg daily  Medication compliance:  compliant most of the time  Home blood pressure monitoring: Yes -occasional and controlled when checked.    She  is somewhat  adherent to a low sodium diet.     Symptoms: None  Laboratory:  Lab Results   Component Value Date    BUN 14 2023    CREATININE 0.6 2023       Hyperlipidemia:   Medication:   Atorvastatin 40 mg nightly  Low Fat, Low Choleterol Diet:  yes -some  Myalgias or GI upset: no  The patient exercises rarely.  Laboratory:    Lab Results   Component Value Date    CHOL 237 (H) 2022    TRIG 88 2022    HDL 50 (L) 2023     2023    LDLDIRECT 106 2014      Lab Results   Component Value Date    ALT 14 2023    AST 18 2023       Anxiety and depression  Medication:              Effexor  mg (75 mg +150 mg) daily              Rexulti 0.5 mg nightly              BuSpar 5 mg twice daily as needed              Amitriptyline 25 mg nightly              Lamotrigine 200 mg twice daily              Hydroxyzine 25 mg every 6 hours as

## 2024-06-14 ENCOUNTER — HOSPITAL ENCOUNTER (OUTPATIENT)
Dept: GENERAL RADIOLOGY | Age: 24
Discharge: HOME | End: 2024-06-14
Payer: COMMERCIAL

## 2024-06-14 DIAGNOSIS — N39.0 URINARY TRACT INFECTION WITHOUT HEMATURIA, SITE UNSPECIFIED: ICD-10-CM

## 2024-06-14 DIAGNOSIS — R10.11 RUQ PAIN: ICD-10-CM

## 2024-06-14 DIAGNOSIS — F32.A ANXIETY AND DEPRESSION: ICD-10-CM

## 2024-06-14 DIAGNOSIS — K21.9 GASTROESOPHAGEAL REFLUX DISEASE, UNSPECIFIED WHETHER ESOPHAGITIS PRESENT: ICD-10-CM

## 2024-06-14 DIAGNOSIS — I10 PRIMARY HYPERTENSION: ICD-10-CM

## 2024-06-14 DIAGNOSIS — M25.512 CHRONIC LEFT SHOULDER PAIN: ICD-10-CM

## 2024-06-14 DIAGNOSIS — G56.03 BILATERAL CARPAL TUNNEL SYNDROME: ICD-10-CM

## 2024-06-14 DIAGNOSIS — Z91.09 ENVIRONMENTAL ALLERGIES: ICD-10-CM

## 2024-06-14 DIAGNOSIS — G89.29 CHRONIC LEFT SHOULDER PAIN: ICD-10-CM

## 2024-06-14 DIAGNOSIS — J45.20 MILD INTERMITTENT ASTHMA WITHOUT COMPLICATION: ICD-10-CM

## 2024-06-14 DIAGNOSIS — F41.9 ANXIETY AND DEPRESSION: ICD-10-CM

## 2024-06-14 DIAGNOSIS — E78.2 MIXED HYPERLIPIDEMIA: ICD-10-CM

## 2024-06-14 LAB
ALBUMIN SERPL-MCNC: 4.2 G/DL (ref 3.5–5.2)
ALP SERPL-CCNC: 22 U/L (ref 35–104)
ALT SERPL-CCNC: 12 U/L (ref 5–33)
ANION GAP SERPL CALCULATED.3IONS-SCNC: 18 MMOL/L (ref 7–19)
AST SERPL-CCNC: 19 U/L (ref 5–32)
BACTERIA URNS QL MICRO: NORMAL /HPF
BASOPHILS # BLD: 0 K/UL (ref 0–0.2)
BASOPHILS NFR BLD: 0.4 % (ref 0–1)
BILIRUB SERPL-MCNC: 0.2 MG/DL (ref 0.2–1.2)
BILIRUB UR QL STRIP: NEGATIVE
BUN SERPL-MCNC: 11 MG/DL (ref 6–20)
CALCIUM SERPL-MCNC: 9.9 MG/DL (ref 8.6–10)
CHLORIDE SERPL-SCNC: 102 MMOL/L (ref 98–111)
CHOLEST SERPL-MCNC: 227 MG/DL (ref 160–199)
CLARITY UR: ABNORMAL
CO2 SERPL-SCNC: 20 MMOL/L (ref 22–29)
COLOR UR: YELLOW
CREAT SERPL-MCNC: 0.7 MG/DL (ref 0.5–0.9)
CREAT UR-MCNC: 127.2 MG/DL (ref 28–217)
CRYSTALS URNS MICRO: NORMAL /HPF
EOSINOPHIL # BLD: 0.3 K/UL (ref 0–0.6)
EOSINOPHIL NFR BLD: 3.2 % (ref 0–5)
EPI CELLS #/AREA URNS AUTO: 2 /HPF (ref 0–5)
ERYTHROCYTE [DISTWIDTH] IN BLOOD BY AUTOMATED COUNT: 13.5 % (ref 11.5–14.5)
GLUCOSE SERPL-MCNC: 75 MG/DL (ref 74–109)
GLUCOSE UR STRIP.AUTO-MCNC: NEGATIVE MG/DL
HBA1C MFR BLD: 5.1 % (ref 4–6)
HCT VFR BLD AUTO: 41.4 % (ref 37–47)
HDLC SERPL-MCNC: 48 MG/DL (ref 65–121)
HGB BLD-MCNC: 13.1 G/DL (ref 12–16)
HGB UR STRIP.AUTO-MCNC: ABNORMAL MG/L
HYALINE CASTS #/AREA URNS AUTO: 4 /HPF (ref 0–8)
IMM GRANULOCYTES # BLD: 0 K/UL
KETONES UR STRIP.AUTO-MCNC: NEGATIVE MG/DL
LDLC SERPL CALC-MCNC: 129 MG/DL
LEUKOCYTE ESTERASE UR QL STRIP.AUTO: NEGATIVE
LYMPHOCYTES # BLD: 2.7 K/UL (ref 1.1–4.5)
LYMPHOCYTES NFR BLD: 33.5 % (ref 20–40)
MCH RBC QN AUTO: 29.4 PG (ref 27–31)
MCHC RBC AUTO-ENTMCNC: 31.6 G/DL (ref 33–37)
MCV RBC AUTO: 93 FL (ref 81–99)
MICROALBUMIN UR-MCNC: <1.2 MG/DL (ref 0–19)
MICROALBUMIN/CREAT UR-RTO: NORMAL MG/G
MONOCYTES # BLD: 0.5 K/UL (ref 0–0.9)
MONOCYTES NFR BLD: 5.8 % (ref 0–10)
NEUTROPHILS # BLD: 4.6 K/UL (ref 1.5–7.5)
NEUTS SEG NFR BLD: 56.7 % (ref 50–65)
NITRITE UR QL STRIP.AUTO: NEGATIVE
PH UR STRIP.AUTO: 7 [PH] (ref 5–8)
PLATELET # BLD AUTO: 357 K/UL (ref 130–400)
PMV BLD AUTO: 9.1 FL (ref 9.4–12.3)
POTASSIUM SERPL-SCNC: 4.2 MMOL/L (ref 3.5–5)
PROT SERPL-MCNC: 7.1 G/DL (ref 6.6–8.7)
PROT UR STRIP.AUTO-MCNC: NEGATIVE MG/DL
RBC # BLD AUTO: 4.45 M/UL (ref 4.2–5.4)
RBC #/AREA URNS AUTO: 4 /HPF (ref 0–4)
SODIUM SERPL-SCNC: 140 MMOL/L (ref 136–145)
SP GR UR STRIP.AUTO: 1.01 (ref 1–1.03)
T4 FREE SERPL-MCNC: 1 NG/DL (ref 0.93–1.7)
TRIGL SERPL-MCNC: 252 MG/DL (ref 0–149)
TSH SERPL DL<=0.005 MIU/L-ACNC: 1.66 UIU/ML (ref 0.27–4.2)
UROBILINOGEN UR STRIP.AUTO-MCNC: 0.2 E.U./DL
WBC # BLD AUTO: 8.1 K/UL (ref 4.8–10.8)
WBC #/AREA URNS AUTO: 3 /HPF (ref 0–5)

## 2024-06-14 PROCEDURE — 73030 X-RAY EXAM OF SHOULDER: CPT

## 2024-06-17 DIAGNOSIS — E78.2 MIXED HYPERLIPIDEMIA: Primary | ICD-10-CM

## 2024-06-17 RX ORDER — ATORVASTATIN CALCIUM 80 MG/1
80 TABLET, FILM COATED ORAL DAILY
Qty: 90 TABLET | Refills: 3 | Status: SHIPPED | OUTPATIENT
Start: 2024-06-17

## 2024-06-17 NOTE — TELEPHONE ENCOUNTER
Called patient, spoke with: Patient regarding the results of the patients most recent labs.  I advised Patient of Dr. Aragon recommendations.   Patient did voice understanding      Medication pending

## 2024-06-17 NOTE — TELEPHONE ENCOUNTER
----- Message from VIDAL Aragon DO sent at 6/14/2024  4:47 PM CDT -----  Your metabolic profile is normal.  This includes kidney and liver functions as well as electrolytes.  Lipids are elevated.  Have you been taking atorvastatin consistently on a nightly basis.  If not, I would recommend restarting it.  If you have been taking atorvastatin regularly, I would recommend increasing the dose to 80 mg or we can change to rosuvastatin 40 mg nightly.  Urinalysis is mostly normal with just trace blood  Your WBC, (infection fighting ability) Hgb and Hct, (oxygen carrying cells) are normal; as is your percentage of each cell type.  Thyroid is normal.  There is no significant protein excretion in the urine.  Hemoglobin A1c is 5.1 which indicates excellent blood sugar control over the past 3 months.

## 2024-06-24 ENCOUNTER — TELEPHONE (OUTPATIENT)
Dept: FAMILY MEDICINE CLINIC | Age: 24
End: 2024-06-24

## 2024-06-24 DIAGNOSIS — M25.512 CHRONIC LEFT SHOULDER PAIN: Primary | ICD-10-CM

## 2024-06-24 DIAGNOSIS — G89.29 CHRONIC LEFT SHOULDER PAIN: Primary | ICD-10-CM

## 2024-06-24 NOTE — TELEPHONE ENCOUNTER
We can order an MRI of the left shoulder.  Diagnosis chronic left shoulder pain with unremarkable x-ray

## 2024-06-24 NOTE — TELEPHONE ENCOUNTER
----- Message from VIDAL Aragon DO sent at 6/24/2024  1:35 PM CDT -----  Left shoulder x-ray is normal

## 2024-06-24 NOTE — TELEPHONE ENCOUNTER
Called patient, spoke with: Patient regarding the results of the patients most recent xrays.  I advised Patient of Dr. Aragon recommendations.   Patient did voice understanding and is wanting to know if Dr Aragon is going  to go ahead and order the MRI. I will send this back and ask.

## 2024-06-26 ENCOUNTER — HOSPITAL ENCOUNTER (OUTPATIENT)
Dept: NEUROLOGY | Age: 24
Discharge: HOME | End: 2024-06-26
Payer: COMMERCIAL

## 2024-06-26 DIAGNOSIS — G56.03 BILATERAL CARPAL TUNNEL SYNDROME: ICD-10-CM

## 2024-06-26 PROCEDURE — 95911 NRV CNDJ TEST 9-10 STUDIES: CPT

## 2024-06-26 PROCEDURE — 95886 MUSC TEST DONE W/N TEST COMP: CPT | Performed by: PSYCHIATRY & NEUROLOGY

## 2024-06-26 PROCEDURE — 95911 NRV CNDJ TEST 9-10 STUDIES: CPT | Performed by: PSYCHIATRY & NEUROLOGY

## 2024-06-26 PROCEDURE — 95886 MUSC TEST DONE W/N TEST COMP: CPT

## 2024-06-26 NOTE — PROCEDURES
[facility]  Neurophysiology Department  [address] [place]  [address]  Phone [phone]  Fax [phone]     NEUROPHYSIOLOGY REPORT  Patient Data  Patient Name Luzma Orlando Referring Provider DANIELA Aragon DO   Account Number [id number] Interpreting physician Aron Gonzalez M.D.    2000 Technologist Eugenie Wilkerson   Age 23 Test Nerve conduction studies/electromyogram   Indications for the test carpal tunnel syndrome Date of test 2024       HISTORY:     Luzma Orlando is a 23 year old woman who complains of numbness and tingling in the bilateral hands.      SUMMARY:     Nerve conduction studies of the bilateral upper extremities showed prolonged median sensory distal latencies.  The low amplitude sensory responses are due to her body habitus.     Electromyogram of the bilateral upper extremities was normal.      INTERPRETATION:     The findings are those of mild to moderate median neuropathies at the wrists (carpal tunnel syndrome) bilaterally.                            Aron Gonzalez M.D.      Sensory NCS      Nerve / Sites Rec. Site Onset Lat Peak Lat NP Amp PP Amp Segments Distance Peak Diff Velocity     ms ms µV µV  cm ms m/s   L Median, Ulnar - Transcarpal comparison      Median Palm Wrist 2.0 2.7 10.6 8.3 Median Palm - Wrist 8  40      Ref.   ?2.2 ?50.0  Ref.         Ulnar Palm Wrist 1.2 1.5 12.2 15.6 Ulnar Palm - Wrist 8  69      Ref.   ?2.2 ?15.0  Ref.            Median Palm - Ulnar Palm  1.1          Ref.  ?0.3    R Median, Ulnar - Transcarpal comparison      Median Palm Wrist 2.0 2.8 14.8 9.0 Median Palm - Wrist 8  40      Ref.   ?2.2 ?50.0  Ref.         Ulnar Palm Wrist 1.4 1.8 4.4 3.8 Ulnar Palm - Wrist 8  56      Ref.   ?2.2 ?15.0  Ref.            Median Palm - Ulnar Palm  1.0          Ref.  ?0.3    L Radial - Anatomical snuff box (Forearm)      Forearm Wrist 2.1 2.8 18.9 16.0 Forearm - Wrist 10  48      Ref.   ?2.9 ?20.0

## 2024-06-27 ENCOUNTER — TELEPHONE (OUTPATIENT)
Dept: FAMILY MEDICINE CLINIC | Age: 24
End: 2024-06-27

## 2024-06-27 NOTE — TELEPHONE ENCOUNTER
----- Message from VIDAL Aragon DO sent at 6/26/2024  6:26 PM CDT -----  EMG shows mild to moderate carpal tunnel syndrome in both wrists.  Recommend cock-up splints bilaterally with sleep.  If this is not effective, we can refer you to orthopedics or neurosurgery for definitive treatment.

## 2024-07-01 ENCOUNTER — TELEMEDICINE (OUTPATIENT)
Dept: FAMILY MEDICINE CLINIC | Age: 24
End: 2024-07-01
Payer: COMMERCIAL

## 2024-07-01 DIAGNOSIS — G89.29 CHRONIC LEFT SHOULDER PAIN: Primary | ICD-10-CM

## 2024-07-01 DIAGNOSIS — M25.512 CHRONIC LEFT SHOULDER PAIN: Primary | ICD-10-CM

## 2024-07-01 DIAGNOSIS — F41.9 ANXIETY AND DEPRESSION: ICD-10-CM

## 2024-07-01 DIAGNOSIS — F32.A ANXIETY AND DEPRESSION: ICD-10-CM

## 2024-07-01 DIAGNOSIS — G47.09 OTHER INSOMNIA: ICD-10-CM

## 2024-07-01 PROCEDURE — 99214 OFFICE O/P EST MOD 30 MIN: CPT | Performed by: PEDIATRICS

## 2024-07-01 RX ORDER — TRAMADOL HYDROCHLORIDE 50 MG/1
50 TABLET ORAL EVERY 6 HOURS PRN
Qty: 28 TABLET | Refills: 0 | Status: SHIPPED | OUTPATIENT
Start: 2024-07-01 | End: 2024-07-08

## 2024-07-01 RX ORDER — HYDROXYZINE HYDROCHLORIDE 25 MG/1
25 TABLET, FILM COATED ORAL EVERY 6 HOURS PRN
Qty: 90 TABLET | Refills: 3 | Status: SHIPPED | OUTPATIENT
Start: 2024-07-01

## 2024-07-01 RX ORDER — LAMOTRIGINE 200 MG/1
200 TABLET ORAL 2 TIMES DAILY
Qty: 60 TABLET | Refills: 11 | Status: SHIPPED | OUTPATIENT
Start: 2024-07-01

## 2024-07-01 RX ORDER — LORAZEPAM 1 MG/1
1 TABLET ORAL EVERY 6 HOURS PRN
Qty: 8 TABLET | Refills: 0 | Status: SHIPPED | OUTPATIENT
Start: 2024-07-01 | End: 2024-07-31

## 2024-07-01 RX ORDER — ARIPIPRAZOLE 5 MG/1
5 TABLET ORAL DAILY
Qty: 90 TABLET | Refills: 3 | Status: SHIPPED | OUTPATIENT
Start: 2024-07-01

## 2024-07-01 SDOH — ECONOMIC STABILITY: FOOD INSECURITY: WITHIN THE PAST 12 MONTHS, THE FOOD YOU BOUGHT JUST DIDN'T LAST AND YOU DIDN'T HAVE MONEY TO GET MORE.: OFTEN TRUE

## 2024-07-01 SDOH — ECONOMIC STABILITY: FOOD INSECURITY: WITHIN THE PAST 12 MONTHS, YOU WORRIED THAT YOUR FOOD WOULD RUN OUT BEFORE YOU GOT MONEY TO BUY MORE.: OFTEN TRUE

## 2024-07-01 SDOH — ECONOMIC STABILITY: INCOME INSECURITY: HOW HARD IS IT FOR YOU TO PAY FOR THE VERY BASICS LIKE FOOD, HOUSING, MEDICAL CARE, AND HEATING?: HARD

## 2024-07-01 ASSESSMENT — ENCOUNTER SYMPTOMS
EYES NEGATIVE: 1
SINUS PAIN: 1
RESPIRATORY NEGATIVE: 1
SINUS PRESSURE: 1

## 2024-07-01 ASSESSMENT — PATIENT HEALTH QUESTIONNAIRE - PHQ9
2. FEELING DOWN, DEPRESSED OR HOPELESS: NOT AT ALL
SUM OF ALL RESPONSES TO PHQ QUESTIONS 1-9: 0
1. LITTLE INTEREST OR PLEASURE IN DOING THINGS: NOT AT ALL
SUM OF ALL RESPONSES TO PHQ9 QUESTIONS 1 & 2: 0
SUM OF ALL RESPONSES TO PHQ QUESTIONS 1-9: 0

## 2024-07-01 NOTE — PROGRESS NOTES
[place] [address] [place] [address]  Phone [phone]   Fax [phone]      OFFICE VISIT: 2024    Luzma Orlando-: 2000      HPI  Reason For Visit:  Luzma is a 23 y.o.     Follow-up and Medication Refill    Patient presents on follow-up for medication refill.  Present concerns:    She is pending MRI on her shoulder in the next couple weeks.  She is very nervous and needs something to help her with her claustrophobia.  She has had a lot of difficulty with mri in the past.  We discussed lorazepam.      Anxiety and depression:  She was unable to get Rexulti with her insurance.  She needs an alternative in this regard.  We discussed Abilify.  This can be taken with her Effexor and lamotrigine.  She notes that she also needs a refill of her lamotrigine.    We reviewed blood work from 2024.         vitals were not taken for this visit.      There is no height or weight on file to calculate BMI.      I have reviewed the following with the Ms. Orlando   Lab Review  Orders Only on 2024   Component Date Value    TSH 2024 1.660     T4 Free 2024 1.00     Microalb, Ur 2024 <1.20     Creatinine, Ur 2024 127.2     Microalb/Creat Ratio 2024 see below     Cholesterol, Total 2024 227 (H)     Triglycerides 2024 252 (H)     HDL 2024 48 (L)     LDL Cholesterol 2024 129     Hemoglobin A1C 2024 5.1     Sodium 2024 140     Potassium 2024 4.2     Chloride 2024 102     CO2 2024 20 (L)     Anion Gap 2024 18     Glucose 2024 75     BUN 2024 11     Creatinine 2024 0.7     Est, Glom Filt Rate 2024 >90     Calcium 2024 9.9     Total Protein 2024 7.1     Albumin 2024 4.2     Total Bilirubin 2024 0.2     Alkaline Phosphatase 2024 22 (L)     ALT 2024 12     AST 2024 19     WBC 2024 8.1     RBC 2024 4.45     Hemoglobin

## 2024-07-01 NOTE — PATIENT INSTRUCTIONS
[facility] [address]  [phone] ext [phone] -or- [phone] ext [phone]    [place] Adult Protection Services (APS)- You may call [place] APS to ask for help in finding shelter when you do not have other options, such as family or friends, or is the local shelters are full.   Local Phone [phone] (Open Monday through Friday 8:00am to 4:00pm)  Website: https://www.[facility].[place].gov/agencies/dcbs/dpp/apb  24 Hour HOTLINE: 1.749.540.2625    [facility]  Website: [email or web address]/[place]/homeless/shelters#all   [address] [place] [address]  [phone]  [phone]    [facility] Allied Services- Emergency Shelter Program - Emergency food, clothing, and housing programs. Contact your local office for assistance.  Website: https://www.[facility]-[place].org  Robert:   [address], [place] [address]   [phone]  Baez:   [address] [place] [address]  [phone]  [facility]:   [address] [place] [address]  [phone]  [place]:   [address] [place], [place] [id number] [phone]  Paige:   [address] [place] [address]  [phone]  [place]:   [address] [place] [address]  [phone]  [place]:   [address] [place] [address]  [phone]  Pedro:   [address] [facility] [place] [facility] [id number] [phone]    [facility] - Located at [facility]   [address] [place] [address]  [phone]  The [facility] opens during the month of November and closes towards the end of  March. You are welcome to walk-in between 5:30pm to 10:00pm with expected next day  departure between 6:30am to 8:30am. Capacity is 20 individuals. First come; first served. Pets  are not welcome.    General Housing Assistance    Search for subsidized apartments:  Subsidized apartments owners offer reduced rent to low-income tenants. Contact or visit the management office of each apartment building that

## 2024-07-15 DIAGNOSIS — G47.09 OTHER INSOMNIA: ICD-10-CM

## 2024-07-15 DIAGNOSIS — F41.9 ANXIETY AND DEPRESSION: ICD-10-CM

## 2024-07-15 DIAGNOSIS — F32.A ANXIETY AND DEPRESSION: ICD-10-CM

## 2024-07-15 RX ORDER — HYDROXYZINE HYDROCHLORIDE 25 MG/1
TABLET, FILM COATED ORAL
Qty: 90 TABLET | Refills: 3 | OUTPATIENT
Start: 2024-07-15

## 2024-07-15 NOTE — TELEPHONE ENCOUNTER
Medication ordered with refills to same pharmacy. Duplicate request.    hydrOXYzine HCl (ATARAX) 25 MG tablet [[id number]]    Order Details  Dose: 25 mg Route: Oral Frequency: EVERY 6 HOURS PRN for Itching   Dispense Quantity: 90 tablet Refills: 3          Sig: Take 1 tablet by mouth every 6 hours as needed for Itching         Start Date: 07/01/24 End Date: --   Written Date: 07/01/24 Expiration Date: 07/01/25   Pharmacy    [facility] [place] - [facility] [place] - P [phone] - F [phone]  [facility] [place] [address]  Phone: [phone]  Fax: [phone]

## 2024-07-18 ENCOUNTER — HOSPITAL ENCOUNTER (OUTPATIENT)
Dept: MRI IMAGING | Age: 24
Discharge: HOME | End: 2024-07-18
Payer: COMMERCIAL

## 2024-07-18 DIAGNOSIS — M25.512 CHRONIC LEFT SHOULDER PAIN: ICD-10-CM

## 2024-07-18 DIAGNOSIS — G89.29 CHRONIC LEFT SHOULDER PAIN: ICD-10-CM

## 2024-07-18 PROCEDURE — 73221 MRI JOINT UPR EXTREM W/O DYE: CPT

## 2024-07-19 ENCOUNTER — TELEPHONE (OUTPATIENT)
Dept: FAMILY MEDICINE CLINIC | Age: 24
End: 2024-07-19

## 2024-07-19 DIAGNOSIS — G89.29 CHRONIC LEFT SHOULDER PAIN: Primary | ICD-10-CM

## 2024-07-19 DIAGNOSIS — M25.512 CHRONIC LEFT SHOULDER PAIN: Primary | ICD-10-CM

## 2024-07-19 NOTE — TELEPHONE ENCOUNTER
Called patient, spoke with: Patient regarding the results of the patients most recent MRI.  I advised Patient of Dr. Aragon recommendations.   Patient did voice understanding      Referral placed

## 2024-07-19 NOTE — TELEPHONE ENCOUNTER
----- Message from EVELYN Kaiser sent at 7/19/2024  8:52 AM CDT -----  Please call patient and let them know results.   MRI shoulder is essentially negative. No tears noted. Can refer to orthopedics if patient would like.

## 2024-07-30 DIAGNOSIS — M62.838 MUSCLE SPASM: ICD-10-CM

## 2024-07-30 RX ORDER — CYCLOBENZAPRINE HCL 10 MG
10 TABLET ORAL 3 TIMES DAILY PRN
Qty: 90 TABLET | Refills: 3 | Status: SHIPPED | OUTPATIENT
Start: 2024-07-30

## 2024-07-30 NOTE — TELEPHONE ENCOUNTER
Received My Chart Message from patient requesting refill on medication(s). Pt was last seen in office on 7/1/2024  and has a follow up scheduled for 9/12/2024. Will send request to provider for authorization.     Requested Prescriptions     Pending Prescriptions Disp Refills    cyclobenzaprine (FLEXERIL) 10 MG tablet 90 tablet 3     Sig: Take 1 tablet by mouth 3 times daily as needed for Muscle spasms

## 2024-08-16 ENCOUNTER — PATIENT MESSAGE (OUTPATIENT)
Dept: FAMILY MEDICINE CLINIC | Age: 24
End: 2024-08-16

## 2024-08-16 DIAGNOSIS — T30.0 BURN: Primary | ICD-10-CM

## 2024-08-19 NOTE — TELEPHONE ENCOUNTER
Changed to mupirocin 2% ointment on the wound.  Dispense 30 g tube    Triamcinolone 0.5% on the area where the dressings have irritated the skin.  Dispense 45 g

## 2024-08-20 ENCOUNTER — PATIENT MESSAGE (OUTPATIENT)
Dept: FAMILY MEDICINE CLINIC | Age: 24
End: 2024-08-20

## 2024-08-20 RX ORDER — TRIAMCINOLONE ACETONIDE 5 MG/G
OINTMENT TOPICAL 2 TIMES DAILY
Qty: 45 G | Refills: 0 | Status: SHIPPED | OUTPATIENT
Start: 2024-08-20

## 2024-08-29 DIAGNOSIS — K21.9 GASTROESOPHAGEAL REFLUX DISEASE, UNSPECIFIED WHETHER ESOPHAGITIS PRESENT: ICD-10-CM

## 2024-08-29 DIAGNOSIS — G47.09 OTHER INSOMNIA: ICD-10-CM

## 2024-08-29 DIAGNOSIS — J45.20 MILD INTERMITTENT ASTHMA WITHOUT COMPLICATION: ICD-10-CM

## 2024-08-29 DIAGNOSIS — F41.9 ANXIETY AND DEPRESSION: ICD-10-CM

## 2024-08-29 DIAGNOSIS — F32.A ANXIETY AND DEPRESSION: ICD-10-CM

## 2024-08-29 DIAGNOSIS — F51.01 PRIMARY INSOMNIA: ICD-10-CM

## 2024-08-29 RX ORDER — OMEPRAZOLE 40 MG/1
40 CAPSULE, DELAYED RELEASE ORAL 2 TIMES DAILY
Qty: 180 CAPSULE | Refills: 3 | Status: SHIPPED | OUTPATIENT
Start: 2024-08-29

## 2024-08-29 RX ORDER — NORGESTIMATE AND ETHINYL ESTRADIOL 0.25-0.035
1 KIT ORAL DAILY
Qty: 84 TABLET | Refills: 3 | Status: SHIPPED | OUTPATIENT
Start: 2024-08-29

## 2024-08-29 RX ORDER — VENLAFAXINE HYDROCHLORIDE 150 MG/1
150 CAPSULE, EXTENDED RELEASE ORAL DAILY
Qty: 90 CAPSULE | Refills: 3 | Status: SHIPPED | OUTPATIENT
Start: 2024-08-29

## 2024-08-29 RX ORDER — MONTELUKAST SODIUM 10 MG/1
10 TABLET ORAL NIGHTLY
Qty: 90 TABLET | Refills: 3 | Status: SHIPPED | OUTPATIENT
Start: 2024-08-29

## 2024-08-29 RX ORDER — VENLAFAXINE HYDROCHLORIDE 75 MG/1
75 CAPSULE, EXTENDED RELEASE ORAL DAILY
Qty: 90 CAPSULE | Refills: 3 | Status: SHIPPED | OUTPATIENT
Start: 2024-08-29

## 2024-08-29 RX ORDER — BUSPIRONE HYDROCHLORIDE 5 MG/1
5 TABLET ORAL 2 TIMES DAILY
Qty: 180 TABLET | Refills: 3 | Status: SHIPPED | OUTPATIENT
Start: 2024-08-29

## 2024-08-29 RX ORDER — FAMOTIDINE 40 MG/1
40 TABLET, FILM COATED ORAL NIGHTLY
Qty: 90 TABLET | Refills: 3 | Status: SHIPPED | OUTPATIENT
Start: 2024-08-29

## 2024-08-29 NOTE — TELEPHONE ENCOUNTER
Received fax from pharmacy requesting refill on pts medication(s). Pt was last seen in office on 7/1/2024  and has a follow up scheduled for 9/12/2024. Will send request to  Dr. Aragon  for authorization.     Requested Prescriptions     Pending Prescriptions Disp Refills    montelukast (SINGULAIR) 10 MG tablet 90 tablet 3     Sig: Take 1 tablet by mouth nightly

## 2024-08-29 NOTE — TELEPHONE ENCOUNTER
Received fax from pharmacy requesting refill on pts medication(s). Pt was last seen in office on 7/1/2024  and has a follow up scheduled for 8/29/2024. Will send request to  Dr. Aragon  for patient.     Requested Prescriptions     Pending Prescriptions Disp Refills    busPIRone (BUSPAR) 5 MG tablet 180 tablet 3     Sig: Take 1 tablet by mouth 2 times daily    venlafaxine (EFFEXOR XR) 75 MG extended release capsule 90 capsule 3     Sig: Take 1 capsule by mouth daily    venlafaxine (EFFEXOR XR) 150 MG extended release capsule 90 capsule 3     Sig: Take 1 capsule by mouth daily    omeprazole (PRILOSEC) 40 MG delayed release capsule 180 capsule 3     Sig: Take 1 capsule by mouth in the morning and at bedtime    famotidine (PEPCID) 40 MG tablet 90 tablet 3     Sig: Take 1 tablet by mouth at bedtime    piroxicam (FELDENE) 20 MG capsule 90 capsule 3     Sig: Take 1 capsule by mouth daily

## 2024-08-29 NOTE — TELEPHONE ENCOUNTER
Received fax from pharmacy requesting refill on pts medication(s). Pt was last seen in office on 7/1/2024  and has a follow up scheduled for 9/12/2024. Will send request to  Dr. Aragon  for authorization.     Requested Prescriptions     Pending Prescriptions Disp Refills    amitriptyline (ELAVIL) 25 MG tablet 90 tablet 3     Sig: Take 1 tablet by mouth nightly

## 2024-08-29 NOTE — TELEPHONE ENCOUNTER
Received fax from pharmacy requesting refill on pts medication(s). Pt was last seen in office on 7/1/2024  and has a follow up scheduled for 8/29/2024. Will send request to  Dr. Aragon  for authorization.     Requested Prescriptions     Pending Prescriptions Disp Refills    norgestimate-ethinyl estradiol (MONO-LINYAH) 0.25-35 MG-MCG per tablet 84 tablet 3     Sig: Take 1 tablet by mouth daily

## 2024-08-30 ENCOUNTER — PATIENT MESSAGE (OUTPATIENT)
Dept: FAMILY MEDICINE CLINIC | Age: 24
End: 2024-08-30

## 2024-08-30 DIAGNOSIS — Z92.89 HISTORY OF VENTRICULOPERITONEAL SHUNTING: ICD-10-CM

## 2024-08-30 DIAGNOSIS — R51.9 CHRONIC DAILY HEADACHE: Primary | ICD-10-CM

## 2024-08-30 DIAGNOSIS — Z98.2 HISTORY OF BRAIN SHUNT: ICD-10-CM

## 2024-09-03 ENCOUNTER — TELEPHONE (OUTPATIENT)
Dept: NEUROLOGY | Age: 24
End: 2024-09-03

## 2024-09-03 NOTE — TELEPHONE ENCOUNTER
Luzma's PCP send a new referral for her to see Marin Hurst. I scheduled an appt on 11-6-2024, but she wanted you to be aware that she is having headaches on the right side where her shunt is. Please call her.          Thank you

## 2024-09-24 ENCOUNTER — TELEPHONE (OUTPATIENT)
Dept: NEUROSURGERY | Age: 24
End: 2024-09-24

## 2024-09-24 ENCOUNTER — TELEMEDICINE (OUTPATIENT)
Dept: FAMILY MEDICINE CLINIC | Age: 24
End: 2024-09-24
Payer: COMMERCIAL

## 2024-09-24 DIAGNOSIS — R51.9 CHRONIC NONINTRACTABLE HEADACHE, UNSPECIFIED HEADACHE TYPE: Primary | ICD-10-CM

## 2024-09-24 DIAGNOSIS — G89.29 CHRONIC NONINTRACTABLE HEADACHE, UNSPECIFIED HEADACHE TYPE: Primary | ICD-10-CM

## 2024-09-24 DIAGNOSIS — Z86.69 HISTORY OF CHIARI MALFORMATION: ICD-10-CM

## 2024-09-24 DIAGNOSIS — J45.20 MILD INTERMITTENT ASTHMA WITHOUT COMPLICATION: ICD-10-CM

## 2024-09-24 DIAGNOSIS — R26.89 BALANCE PROBLEM: ICD-10-CM

## 2024-09-24 PROCEDURE — 99214 OFFICE O/P EST MOD 30 MIN: CPT | Performed by: PEDIATRICS

## 2024-09-24 RX ORDER — ALBUTEROL SULFATE 90 UG/1
2 INHALANT RESPIRATORY (INHALATION) 4 TIMES DAILY PRN
Qty: 18 G | Refills: 5 | Status: SHIPPED | OUTPATIENT
Start: 2024-09-24

## 2024-09-24 ASSESSMENT — ENCOUNTER SYMPTOMS
EYES NEGATIVE: 1
RESPIRATORY NEGATIVE: 1

## 2024-09-25 ENCOUNTER — HOSPITAL ENCOUNTER (OUTPATIENT)
Dept: GENERAL RADIOLOGY | Age: 24
Discharge: HOME | End: 2024-09-25
Payer: COMMERCIAL

## 2024-09-25 DIAGNOSIS — G89.29 CHRONIC NONINTRACTABLE HEADACHE, UNSPECIFIED HEADACHE TYPE: ICD-10-CM

## 2024-09-25 DIAGNOSIS — R51.9 CHRONIC NONINTRACTABLE HEADACHE, UNSPECIFIED HEADACHE TYPE: ICD-10-CM

## 2024-09-25 DIAGNOSIS — Z86.69 HISTORY OF CHIARI MALFORMATION: ICD-10-CM

## 2024-09-25 DIAGNOSIS — R26.89 BALANCE PROBLEM: ICD-10-CM

## 2024-09-25 PROCEDURE — 70250 X-RAY EXAM OF SKULL: CPT

## 2024-09-26 ENCOUNTER — TELEPHONE (OUTPATIENT)
Dept: FAMILY MEDICINE CLINIC | Age: 24
End: 2024-09-26

## 2024-09-30 ENCOUNTER — TELEPHONE (OUTPATIENT)
Dept: NEUROLOGY | Age: 24
End: 2024-09-30

## 2024-09-30 ENCOUNTER — OFFICE VISIT (OUTPATIENT)
Dept: NEUROLOGY | Age: 24
End: 2024-09-30
Payer: COMMERCIAL

## 2024-09-30 VITALS
OXYGEN SATURATION: 97 % | HEIGHT: 68 IN | DIASTOLIC BLOOD PRESSURE: 82 MMHG | BODY MASS INDEX: 44.41 KG/M2 | WEIGHT: 293 LBS | HEART RATE: 88 BPM | SYSTOLIC BLOOD PRESSURE: 128 MMHG

## 2024-09-30 DIAGNOSIS — Z86.69 HISTORY OF CHIARI MALFORMATION: ICD-10-CM

## 2024-09-30 DIAGNOSIS — G40.909 SEIZURE DISORDER (HCC): Primary | ICD-10-CM

## 2024-09-30 DIAGNOSIS — Z98.2 S/P VP SHUNT: ICD-10-CM

## 2024-09-30 PROCEDURE — 3074F SYST BP LT 130 MM HG: CPT | Performed by: PSYCHIATRY & NEUROLOGY

## 2024-09-30 PROCEDURE — 99204 OFFICE O/P NEW MOD 45 MIN: CPT | Performed by: PSYCHIATRY & NEUROLOGY

## 2024-09-30 PROCEDURE — 3079F DIAST BP 80-89 MM HG: CPT | Performed by: PSYCHIATRY & NEUROLOGY

## 2024-09-30 RX ORDER — LAMOTRIGINE 100 MG/1
250 TABLET ORAL 2 TIMES DAILY
Qty: 150 TABLET | Refills: 11 | Status: SHIPPED | OUTPATIENT
Start: 2024-09-30

## 2024-09-30 NOTE — TELEPHONE ENCOUNTER
[facility] New Patient Questionnaire    Diagnosis/Reason for Referral/Is this related to Work Comp? NO  Z86.69 (ICD-10-CM) - History of Chiari malformation   Z98.2 (ICD-10-CM) - S/P  shunt           2. Who is completing questionnaire?      Patient  X Caregiver Family      3. Has the patient had any previous spinal/brain surgeries? YES        A. If yes, what is the name of the facility in which the surgery was performed?        B. Procedure/Surgery performed?    SHUNT PLACED   C. Who was the surgeon?  DR. GRISELDA KHAN       D. When was the surgery?  2002           E. Did the patient improve after the surgery?        4. Is this a second opinion?   If yes, Dr. Bennett would like to review patient first before making the appointment.      5. Have MRI Images been obtain within the last year?      Yes X No      XR  CT     If yes, where was the imaging performed? [facility]     If yes, what part of the body?      Lumbar  Cervical  Thoracic  Brain X     If yes, when was it obtained?      MM/YY    Note: if the scan was performed at a facility other than [facility], the disc will need to be brought to the appointment or we need to reach out to obtain the disc.     A. Was the patient instructed to provide the disc?      Yes   No X      8. Has the patient had a NCV/EMG within the last year?      Yes  No X     If yes, where was it performed and date?      MM/YY  Location:      9. Has the patient been to Physical Therapy?      Yes  No X     If yes, what location, how long attended, and last visit?    Location:        Therapy Lasted:    Date of Last Visit:      10. Has the patient been to Pain Management?     Yes  No X     If yes, what location and last visit     Location:   Last Visit:   Is it helping?    11. Has your primary care provider or referring provider ordered any testing prior to this appointment that has not occurred?                   If yes, what is the location and date scheduled or performed          Location

## 2024-09-30 NOTE — PROGRESS NOTES
[facility] Office Note      Patient:   Luzma Orlando  MR#:    [id number]  Account Number:                         YOB: 2000  Date of Evaluation:  9/30/2024  Time of Note:                          10:02 AM  Primary/Referring Physician:  VIDAL Aragon DO   Consulting Physician:  Marin Hurst DO    NEW PATIENT VISIT    Chief Complaint   Patient presents with    New Patient    Headache     Having headaches daily on the right side     Seizures     Last seizure was on 9-    Other     Shunt     Difficulty Walking     Taking extra big steps like she is walking on steps      Altered Mental Status     Very snappy        HISTORY OF PRESENT ILLNESS    Luzma Orlando is a 24 y.o. year old female here for evaluation of seizures. Last seen in 2019.   Noting recent seizure recurrence this past week. Noting more headaches as well.  Still on Lamictal 200 mg bid.  Prior EEG abn with left posterior spike discharges maximal at T5, P3, and O1.  Prior MRI with  shunt, Chiari Malformation suboccipital decompression surgery.  Patient has not been seen by neurosurgery in years.  Very likely she is no longer shunt dependent.  Shunt placed at 13 months of age, no revision.  Prior repeat CTs in the past have been stable from a ventriclar size standpoint.  No new complaints today.      Past Medical History:   Diagnosis Date    Allergic rhinitis     Anxiety     Asthma     Depression     Headache     History of Chiari malformation     Hyperlipidemia     Hypertension     Indigestion     Knee pain     Obesity     Seizures (HCC)     Sleep apnea     Urinary incontinence     VSD (ventricular septal defect)        Past Surgical History:   Procedure Laterality Date    ADENOIDECTOMY      BRAIN SURGERY      KNEE ARTHROSCOPY Left 01/26/2023    LEFT KNEE PATELLAR CHONDROPLASTY performed by Elliot Rinaldi MD at [facility] OR    OTHER SURGICAL HISTORY      chiari decompression    TONSILLECTOMY      TYMPANOSTOMY TUBE

## 2024-10-01 ENCOUNTER — TELEPHONE (OUTPATIENT)
Dept: FAMILY MEDICINE CLINIC | Age: 24
End: 2024-10-01

## 2024-10-01 ENCOUNTER — PATIENT MESSAGE (OUTPATIENT)
Dept: FAMILY MEDICINE CLINIC | Age: 24
End: 2024-10-01

## 2024-10-01 DIAGNOSIS — F41.9 ANXIETY: Primary | ICD-10-CM

## 2024-10-01 RX ORDER — DIAZEPAM 5 MG
TABLET ORAL
Qty: 3 TABLET | Refills: 0 | Status: SHIPPED | OUTPATIENT
Start: 2024-10-01 | End: 2024-10-02

## 2024-10-01 NOTE — TELEPHONE ENCOUNTER
We can try diazepam 5 mg, tab 2 p.o. 1 hour before the evaluation.  Recommend taking an additional tablet with you.  You can take this tablet if you are still anxious prior to the evaluation.  Dispense #3 with no refills

## 2024-10-01 NOTE — TELEPHONE ENCOUNTER
----- Message from Dr. VIDAL Aragon DO sent at 9/30/2024  6:03 PM CDT -----  Shunt series shows an intact  shunt.  No problem seen on the x-rays

## 2024-10-17 ENCOUNTER — HOSPITAL ENCOUNTER (OUTPATIENT)
Dept: MRI IMAGING | Age: 24
Discharge: HOME | End: 2024-10-17
Attending: PSYCHIATRY & NEUROLOGY
Payer: COMMERCIAL

## 2024-10-17 DIAGNOSIS — G40.909 SEIZURE DISORDER (HCC): ICD-10-CM

## 2024-10-17 PROCEDURE — A9577 INJ MULTIHANCE: HCPCS | Performed by: PSYCHIATRY & NEUROLOGY

## 2024-10-17 PROCEDURE — 6360000004 HC RX CONTRAST MEDICATION: Performed by: PSYCHIATRY & NEUROLOGY

## 2024-10-17 PROCEDURE — 70553 MRI BRAIN STEM W/O & W/DYE: CPT

## 2024-10-17 RX ADMIN — GADOBENATE DIMEGLUMINE 20 ML: 529 INJECTION, SOLUTION INTRAVENOUS at 09:26

## 2024-11-12 ENCOUNTER — OFFICE VISIT (OUTPATIENT)
Dept: NEUROSURGERY | Age: 24
End: 2024-11-12
Payer: COMMERCIAL

## 2024-11-12 ENCOUNTER — TELEPHONE (OUTPATIENT)
Dept: FAMILY MEDICINE CLINIC | Age: 24
End: 2024-11-12

## 2024-11-12 VITALS
WEIGHT: 293 LBS | SYSTOLIC BLOOD PRESSURE: 143 MMHG | HEART RATE: 113 BPM | HEIGHT: 68 IN | BODY MASS INDEX: 44.41 KG/M2 | DIASTOLIC BLOOD PRESSURE: 99 MMHG

## 2024-11-12 DIAGNOSIS — Z86.69 HISTORY OF HYDROCEPHALUS: ICD-10-CM

## 2024-11-12 DIAGNOSIS — Z86.69 HISTORY OF CHIARI MALFORMATION: ICD-10-CM

## 2024-11-12 DIAGNOSIS — Z98.2 VP (VENTRICULOPERITONEAL) SHUNT STATUS: Primary | ICD-10-CM

## 2024-11-12 DIAGNOSIS — R51.9 FREQUENT HEADACHES: ICD-10-CM

## 2024-11-12 PROCEDURE — 3077F SYST BP >= 140 MM HG: CPT | Performed by: NEUROLOGICAL SURGERY

## 2024-11-12 PROCEDURE — 99204 OFFICE O/P NEW MOD 45 MIN: CPT | Performed by: NEUROLOGICAL SURGERY

## 2024-11-12 PROCEDURE — 3080F DIAST BP >= 90 MM HG: CPT | Performed by: NEUROLOGICAL SURGERY

## 2024-11-12 ASSESSMENT — ENCOUNTER SYMPTOMS
EYES NEGATIVE: 1
RESPIRATORY NEGATIVE: 1
GASTROINTESTINAL NEGATIVE: 1

## 2024-11-12 NOTE — PROGRESS NOTES
Review of Systems   Constitutional: Negative.    HENT: Negative.     Eyes: Negative.    Respiratory: Negative.     Cardiovascular: Negative.    Gastrointestinal: Negative.    Genitourinary: Negative.    Musculoskeletal: Negative.    Skin: Negative.    Neurological:  Positive for dizziness and headaches.   Endo/Heme/Allergies: Negative.    Psychiatric/Behavioral: Negative.         
signal is identified   within the dural venous sinuses.  There is no restricted diffusion.  The lateral ventricles and cortical sulci are normal.  The basal cisterns are patent.  Normal flow voids are identified within the basal cisterns.  The seventh and eighth cranial nerve   complexes are normal.  No abnormal enhancement is identified within the brain on postcontrast images.  No acute abnormalities are seen within the medial temporal lobes.  There are probable surgical clips seen along the right parietal, right occipital   calvarium causing artifact.  There appears to be a right trans parietal  ventriculostomy catheter seen in the region of the right lateral ventricle.  Multiple prominent perivascular spaces are seen bilaterally.     No acute hemorrhages are seen.  There are no extraaxial collections.  There is no mass effect.     There is cerebellar tonsil descent inferior to the plane of the foramen magnum measuring approximately 8.9 mm. The midline structures are otherwise normal.  The soft tissues of the nasopharynx and and skull base appear within normal limits.     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     A right transparietal ventriculostomy catheter is identified in the right lateral ventricle.     No acute intracranial abnormalities are seen otherwise.        ______________________________________   Electronically signed by: ALEX RODRIGES M.D.  Date:     10/17/2024  Time:    09:54            Exam Ended: 10/17/24 09:26 CDT Last Resulted: 10/17/24 11:11 CDT          ASSESSMENT:    This is a 24 y.o. female with a history of seizure disorder, hydrocephalus as an infant,  shunt placement, history of chiari malformation s/p decompression (2006).        ICD-10-CM    1.  (ventriculoperitoneal) shunt status  Z98.2       2. History of hydrocephalus  Z86.69       3. Frequent headaches  R51.9

## 2024-11-12 NOTE — TELEPHONE ENCOUNTER
Patient is at neurology appt right now and her BP is 145/114     She was told to let Dr Aragon know if it got above 130-135    We can call her or her mother back.    She is asking if she needs to be off work this week? She thinks her work stress is what is causing her BP to spike.    Will send to provider. Please advise.

## 2025-03-04 ENCOUNTER — TELEPHONE (OUTPATIENT)
Age: 25
End: 2025-03-04

## 2025-03-04 NOTE — TELEPHONE ENCOUNTER
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 I instructed patient that notes aren't usually wrote unless seen in office but will forward request on to provider.

## 2025-03-04 NOTE — TELEPHONE ENCOUNTER
I believe we typically follow protocol but I am not hard and fast on this if it is going to be a problem for her

## 2025-03-05 NOTE — TELEPHONE ENCOUNTER
Call returned to patient regarding work excuse.  This has been entered into epic and patient will print this out and give to [employer].

## 2025-05-30 ENCOUNTER — NURSE ONLY (OUTPATIENT)
Age: 25
End: 2025-05-30

## 2025-05-30 SDOH — ECONOMIC STABILITY: FOOD INSECURITY: WITHIN THE PAST 12 MONTHS, YOU WORRIED THAT YOUR FOOD WOULD RUN OUT BEFORE YOU GOT MONEY TO BUY MORE.: SOMETIMES TRUE

## 2025-05-30 SDOH — ECONOMIC STABILITY: FOOD INSECURITY: WITHIN THE PAST 12 MONTHS, THE FOOD YOU BOUGHT JUST DIDN'T LAST AND YOU DIDN'T HAVE MONEY TO GET MORE.: SOMETIMES TRUE

## 2025-05-30 NOTE — PATIENT INSTRUCTIONS
[facility] [facility])  [id number] [address]  [phone] -or- [phone]  The mission of [facility] is to link families and individuals with disabilities to valuable resources that will enable them to live productive, fulfilling lives.    [address] [address]  [phone]  Behavioral Health Counseling Services: Adult Services; Children’s Services; Medical Services; Substance Abuse; Testing & Assessment; Case Management Services; Supported Employment; Behavior Support: Intellectual or Developmental Disability Respite Care: Intellectual or Developmental Disability; In-Home Supports; Outreach and Education; [employer]; Crisis Services; Day Training Program; Consumer Operated Services; Housing Supports; Medicaid Waiver Programs; First Steps; Assertive Community; [place] Maternal Assistance Toward Recovery; Consultation and Education; Employee Assistance Programs; Court Services; Hospital Services    Legal Help Line (Seniors & Disabled)  County: [place], [place], [place], [place], [place] [address]  [phone] -or- [phone]  The mission of [place]  is to assist and enable low income families, elderly, disabled and other vulnerable individuals in [facility] and [facility] to resolve legal problems that are barriers to self sufficiency, and to provide these individuals an opportunity for an improved quality of life.    [facility]  [address] [facility], [facility] [address] [place] [address] [id number] [phone] / Fax: [phone]  TDD: [phone]  [email or web address]org   Serves individuals with Intellectual and Developmental Disabilities. We are a multi-service agency as we focus on servicing people with various disabilities and have many programs intended for the disability population.    [facility]  [phone]  Crisis line for rape victims. Addictions

## (undated) DEVICE — SUTURE MCRYL SZ 3 0 L18IN ABSRB UD PS 2 3 8 CIR REV CUT NDL MCP497G

## (undated) DEVICE — 3M™ STERI-STRIP™ REINFORCED ADHESIVE SKIN CLOSURES, R1547, 1/2 IN X 4 IN (12 MM X 100 MM), 6 STRIPS/ENVELOPE: Brand: 3M™ STERI-STRIP™

## (undated) DEVICE — 3.5 MM FULL RADIUS ELITE STRAIGHT                                    DISPOSABLE BLADES, BEIGE,PACKAGED 6                                    PER BOX, STERILE

## (undated) DEVICE — ZIMMER® STERILE DISPOSABLE TOURNIQUET CUFF WITH PLC, DUAL PORT, SINGLE BLADDER, 34 IN. (86 CM)

## (undated) DEVICE — TUBING, SUCTION, 1/4" X 20', STRAIGHT: Brand: MEDLINE INDUSTRIES, INC.

## (undated) DEVICE — GLOVE SURG SZ 8 CRM LTX FREE POLYISOPRENE POLYMER BEAD ANTI

## (undated) DEVICE — TUBING PMP IRRIG GOFLO

## (undated) DEVICE — BLADE CLP TAPR HD WET DRY CAPABILITY GTT IN CHARGING USE

## (undated) DEVICE — SYRINGE MED 10ML TRNSLUC BRL PLUNG BLK MRK POLYPR CTRL

## (undated) DEVICE — BANDAGE COMPR W3INXL15FT BGE E SGL LAYERED CLP CLSR

## (undated) DEVICE — SOLUTION IRRIG 3000ML 0.9% SOD CHL USP UROMATIC PLAS CONT

## (undated) DEVICE — CHLORAPREP 26ML ORANGE

## (undated) DEVICE — SUPER MULTIVAC 50 WITH INTEGRATED                                    FINGER SWITCHES IFS: Brand: COBLATION

## (undated) DEVICE — SURGICAL PROCEDURE PACK LOWER EXTREMITY LOURDES HOSP